# Patient Record
Sex: FEMALE | HISPANIC OR LATINO | Employment: UNEMPLOYED | URBAN - METROPOLITAN AREA
[De-identification: names, ages, dates, MRNs, and addresses within clinical notes are randomized per-mention and may not be internally consistent; named-entity substitution may affect disease eponyms.]

---

## 2020-03-16 ENCOUNTER — APPOINTMENT (OUTPATIENT)
Dept: RADIOLOGY | Facility: MEDICAL CENTER | Age: 50
End: 2020-03-16
Attending: SURGERY
Payer: COMMERCIAL

## 2020-03-20 ENCOUNTER — HOSPITAL ENCOUNTER (OUTPATIENT)
Dept: RADIOLOGY | Facility: MEDICAL CENTER | Age: 50
End: 2020-03-20
Attending: SURGERY
Payer: COMMERCIAL

## 2020-03-20 DIAGNOSIS — C50.411 MALIGNANT NEOPLASM OF UPPER-OUTER QUADRANT OF RIGHT FEMALE BREAST, UNSPECIFIED ESTROGEN RECEPTOR STATUS (HCC): ICD-10-CM

## 2020-03-20 PROCEDURE — A9503 TC99M MEDRONATE: HCPCS

## 2020-03-30 ENCOUNTER — APPOINTMENT (OUTPATIENT)
Dept: HEMATOLOGY ONCOLOGY | Facility: MEDICAL CENTER | Age: 50
End: 2020-03-30
Payer: COMMERCIAL

## 2020-03-30 ENCOUNTER — HOSPITAL ENCOUNTER (OUTPATIENT)
Dept: RADIOLOGY | Facility: MEDICAL CENTER | Age: 50
End: 2020-03-30
Attending: SURGERY
Payer: COMMERCIAL

## 2020-03-30 DIAGNOSIS — C50.411 MALIGNANT NEOPLASM OF UPPER-OUTER QUADRANT OF RIGHT FEMALE BREAST, UNSPECIFIED ESTROGEN RECEPTOR STATUS (HCC): ICD-10-CM

## 2020-03-30 PROCEDURE — A9552 F18 FDG: HCPCS

## 2020-04-07 ENCOUNTER — APPOINTMENT (OUTPATIENT)
Dept: ADMISSIONS | Facility: MEDICAL CENTER | Age: 50
End: 2020-04-07
Payer: COMMERCIAL

## 2020-04-09 NOTE — PROGRESS NOTES
COVID-19 Pre-surgery screenin. Do you have an undiagnosed respiratory illness or symptoms such as coughing or sneezing? *NO** (Yes/No)  a. Onset of Sx  b. Acute vs. chronic respiratory illness    2. Do you have an unexplained fever greater than 100.4 degrees Fahrenheit or 38 degrees Celsius?     **NO* (Yes/No)    3. Have you had direct exposure to a patient who tested positive for Covid-19?    *NO** (Yes/No)    4. Have you traveled within the last 14 days to [place], [place], [place], [place], or [place]?    *NO** (Yes/No)

## 2020-04-10 ENCOUNTER — ANESTHESIA EVENT (OUTPATIENT)
Dept: SURGERY | Facility: MEDICAL CENTER | Age: 50
End: 2020-04-10
Payer: COMMERCIAL

## 2020-04-10 ENCOUNTER — HOSPITAL ENCOUNTER (OUTPATIENT)
Facility: MEDICAL CENTER | Age: 50
End: 2020-04-10
Attending: SURGERY | Admitting: SURGERY
Payer: COMMERCIAL

## 2020-04-10 ENCOUNTER — APPOINTMENT (OUTPATIENT)
Dept: RADIOLOGY | Facility: MEDICAL CENTER | Age: 50
End: 2020-04-10
Attending: SURGERY
Payer: COMMERCIAL

## 2020-04-10 ENCOUNTER — ANESTHESIA (OUTPATIENT)
Dept: SURGERY | Facility: MEDICAL CENTER | Age: 50
End: 2020-04-10
Payer: COMMERCIAL

## 2020-04-10 VITALS
BODY MASS INDEX: 43.33 KG/M2 | RESPIRATION RATE: 16 BRPM | SYSTOLIC BLOOD PRESSURE: 103 MMHG | HEART RATE: 88 BPM | TEMPERATURE: 97.4 F | WEIGHT: 220.68 LBS | HEIGHT: 60 IN | DIASTOLIC BLOOD PRESSURE: 64 MMHG | OXYGEN SATURATION: 97 %

## 2020-04-10 DIAGNOSIS — G89.18 POST-OPERATIVE PAIN: ICD-10-CM

## 2020-04-10 LAB
ERYTHROCYTE [DISTWIDTH] IN BLOOD BY AUTOMATED COUNT: 41.1 FL (ref 35.9–50)
HCG UR QL: NEGATIVE
HCT VFR BLD AUTO: 41.8 % (ref 37–47)
HGB BLD-MCNC: 13.9 G/DL (ref 12–16)
MCH RBC QN AUTO: 29 PG (ref 27–33)
MCHC RBC AUTO-ENTMCNC: 33.3 G/DL (ref 33.6–35)
MCV RBC AUTO: 87.1 FL (ref 81.4–97.8)
PATHOLOGY CONSULT NOTE: NORMAL
PLATELET # BLD AUTO: 333 K/UL (ref 164–446)
PMV BLD AUTO: 9.8 FL (ref 9–12.9)
RBC # BLD AUTO: 4.8 M/UL (ref 4.2–5.4)
WBC # BLD AUTO: 7.8 K/UL (ref 4.8–10.8)

## 2020-04-10 PROCEDURE — 700101 HCHG RX REV CODE 250: Performed by: SURGERY

## 2020-04-10 PROCEDURE — 88309 TISSUE EXAM BY PATHOLOGIST: CPT

## 2020-04-10 PROCEDURE — 700105 HCHG RX REV CODE 258: Performed by: SURGERY

## 2020-04-10 PROCEDURE — 700111 HCHG RX REV CODE 636 W/ 250 OVERRIDE (IP): Performed by: ANESTHESIOLOGY

## 2020-04-10 PROCEDURE — 160046 HCHG PACU - 1ST 60 MINS PHASE II: Performed by: SURGERY

## 2020-04-10 PROCEDURE — 700111 HCHG RX REV CODE 636 W/ 250 OVERRIDE (IP): Performed by: SURGERY

## 2020-04-10 PROCEDURE — 160002 HCHG RECOVERY MINUTES (STAT): Performed by: SURGERY

## 2020-04-10 PROCEDURE — 160048 HCHG OR STATISTICAL LEVEL 1-5: Performed by: SURGERY

## 2020-04-10 PROCEDURE — 501838 HCHG SUTURE GENERAL: Performed by: SURGERY

## 2020-04-10 PROCEDURE — 500371 HCHG DRAIN, BLAKE 10MM: Performed by: SURGERY

## 2020-04-10 PROCEDURE — 700102 HCHG RX REV CODE 250 W/ 637 OVERRIDE(OP): Performed by: ANESTHESIOLOGY

## 2020-04-10 PROCEDURE — 160025 RECOVERY II MINUTES (STATS): Performed by: SURGERY

## 2020-04-10 PROCEDURE — 160029 HCHG SURGERY MINUTES - 1ST 30 MINS LEVEL 4: Performed by: SURGERY

## 2020-04-10 PROCEDURE — 500389 HCHG DRAIN, RESERVOIR SUCT JP 100CC: Performed by: SURGERY

## 2020-04-10 PROCEDURE — 160035 HCHG PACU - 1ST 60 MINS PHASE I: Performed by: SURGERY

## 2020-04-10 PROCEDURE — A6402 STERILE GAUZE <= 16 SQ IN: HCPCS | Performed by: SURGERY

## 2020-04-10 PROCEDURE — A9270 NON-COVERED ITEM OR SERVICE: HCPCS | Performed by: ANESTHESIOLOGY

## 2020-04-10 PROCEDURE — C1788 PORT, INDWELLING, IMP: HCPCS | Performed by: SURGERY

## 2020-04-10 PROCEDURE — 81025 URINE PREGNANCY TEST: CPT

## 2020-04-10 PROCEDURE — 700101 HCHG RX REV CODE 250: Performed by: ANESTHESIOLOGY

## 2020-04-10 PROCEDURE — 85027 COMPLETE CBC AUTOMATED: CPT

## 2020-04-10 PROCEDURE — 160009 HCHG ANES TIME/MIN: Performed by: SURGERY

## 2020-04-10 PROCEDURE — 88307 TISSUE EXAM BY PATHOLOGIST: CPT | Mod: 59

## 2020-04-10 PROCEDURE — 160036 HCHG PACU - EA ADDL 30 MINS PHASE I: Performed by: SURGERY

## 2020-04-10 PROCEDURE — 501497 HCHG SURGICLIP: Performed by: SURGERY

## 2020-04-10 PROCEDURE — 160041 HCHG SURGERY MINUTES - EA ADDL 1 MIN LEVEL 4: Performed by: SURGERY

## 2020-04-10 PROCEDURE — 501445 HCHG STAPLER, SKIN DISP: Performed by: SURGERY

## 2020-04-10 DEVICE — POWER PORTMRI COMPATABLE: Type: IMPLANTABLE DEVICE | Site: CHEST | Status: FUNCTIONAL

## 2020-04-10 RX ORDER — HYDROMORPHONE HYDROCHLORIDE 2 MG/ML
INJECTION, SOLUTION INTRAMUSCULAR; INTRAVENOUS; SUBCUTANEOUS PRN
Status: DISCONTINUED | OUTPATIENT
Start: 2020-04-10 | End: 2020-04-10 | Stop reason: SURG

## 2020-04-10 RX ORDER — HYDROCODONE BITARTRATE AND ACETAMINOPHEN 5; 325 MG/1; MG/1
1-2 TABLET ORAL EVERY 4 HOURS PRN
Qty: 30 TAB | Refills: 0 | Status: SHIPPED | OUTPATIENT
Start: 2020-04-10 | End: 2020-04-17

## 2020-04-10 RX ORDER — MIDAZOLAM HYDROCHLORIDE 1 MG/ML
1 INJECTION INTRAMUSCULAR; INTRAVENOUS
Status: DISCONTINUED | OUTPATIENT
Start: 2020-04-10 | End: 2020-04-10 | Stop reason: HOSPADM

## 2020-04-10 RX ORDER — OXYCODONE HCL 5 MG/5 ML
10 SOLUTION, ORAL ORAL
Status: COMPLETED | OUTPATIENT
Start: 2020-04-10 | End: 2020-04-10

## 2020-04-10 RX ORDER — DEXAMETHASONE SODIUM PHOSPHATE 4 MG/ML
INJECTION, SOLUTION INTRA-ARTICULAR; INTRALESIONAL; INTRAMUSCULAR; INTRAVENOUS; SOFT TISSUE PRN
Status: DISCONTINUED | OUTPATIENT
Start: 2020-04-10 | End: 2020-04-10 | Stop reason: SURG

## 2020-04-10 RX ORDER — TAMOXIFEN CITRATE 10 MG/1
10 TABLET ORAL EVERY EVENING
Status: ON HOLD | COMMUNITY
End: 2020-07-15

## 2020-04-10 RX ORDER — SODIUM CHLORIDE, SODIUM LACTATE, POTASSIUM CHLORIDE, CALCIUM CHLORIDE 600; 310; 30; 20 MG/100ML; MG/100ML; MG/100ML; MG/100ML
INJECTION, SOLUTION INTRAVENOUS CONTINUOUS
Status: DISCONTINUED | OUTPATIENT
Start: 2020-04-10 | End: 2020-04-10 | Stop reason: HOSPADM

## 2020-04-10 RX ORDER — CEFAZOLIN SODIUM 1 G/3ML
INJECTION, POWDER, FOR SOLUTION INTRAMUSCULAR; INTRAVENOUS PRN
Status: DISCONTINUED | OUTPATIENT
Start: 2020-04-10 | End: 2020-04-10 | Stop reason: SURG

## 2020-04-10 RX ORDER — MEPERIDINE HYDROCHLORIDE 25 MG/ML
12.5 INJECTION INTRAMUSCULAR; INTRAVENOUS; SUBCUTANEOUS
Status: DISCONTINUED | OUTPATIENT
Start: 2020-04-10 | End: 2020-04-10 | Stop reason: HOSPADM

## 2020-04-10 RX ORDER — ONDANSETRON 2 MG/ML
4 INJECTION INTRAMUSCULAR; INTRAVENOUS
Status: COMPLETED | OUTPATIENT
Start: 2020-04-10 | End: 2020-04-10

## 2020-04-10 RX ORDER — HALOPERIDOL 5 MG/ML
1 INJECTION INTRAMUSCULAR
Status: DISCONTINUED | OUTPATIENT
Start: 2020-04-10 | End: 2020-04-10 | Stop reason: HOSPADM

## 2020-04-10 RX ORDER — HYDROMORPHONE HYDROCHLORIDE 1 MG/ML
0.1 INJECTION, SOLUTION INTRAMUSCULAR; INTRAVENOUS; SUBCUTANEOUS
Status: DISCONTINUED | OUTPATIENT
Start: 2020-04-10 | End: 2020-04-10 | Stop reason: HOSPADM

## 2020-04-10 RX ORDER — ONDANSETRON 2 MG/ML
INJECTION INTRAMUSCULAR; INTRAVENOUS PRN
Status: DISCONTINUED | OUTPATIENT
Start: 2020-04-10 | End: 2020-04-10 | Stop reason: SURG

## 2020-04-10 RX ORDER — HYDROMORPHONE HYDROCHLORIDE 1 MG/ML
0.4 INJECTION, SOLUTION INTRAMUSCULAR; INTRAVENOUS; SUBCUTANEOUS
Status: DISCONTINUED | OUTPATIENT
Start: 2020-04-10 | End: 2020-04-10 | Stop reason: HOSPADM

## 2020-04-10 RX ORDER — LABETALOL HYDROCHLORIDE 5 MG/ML
5 INJECTION, SOLUTION INTRAVENOUS
Status: DISCONTINUED | OUTPATIENT
Start: 2020-04-10 | End: 2020-04-10 | Stop reason: HOSPADM

## 2020-04-10 RX ORDER — SODIUM CHLORIDE, SODIUM LACTATE, POTASSIUM CHLORIDE, CALCIUM CHLORIDE 600; 310; 30; 20 MG/100ML; MG/100ML; MG/100ML; MG/100ML
1000 INJECTION, SOLUTION INTRAVENOUS CONTINUOUS
Status: DISCONTINUED | OUTPATIENT
Start: 2020-04-10 | End: 2020-04-10 | Stop reason: HOSPADM

## 2020-04-10 RX ORDER — HYDRALAZINE HYDROCHLORIDE 20 MG/ML
5 INJECTION INTRAMUSCULAR; INTRAVENOUS
Status: DISCONTINUED | OUTPATIENT
Start: 2020-04-10 | End: 2020-04-10 | Stop reason: HOSPADM

## 2020-04-10 RX ORDER — OXYCODONE HCL 5 MG/5 ML
5 SOLUTION, ORAL ORAL
Status: COMPLETED | OUTPATIENT
Start: 2020-04-10 | End: 2020-04-10

## 2020-04-10 RX ORDER — DIPHENHYDRAMINE HYDROCHLORIDE 50 MG/ML
12.5 INJECTION INTRAMUSCULAR; INTRAVENOUS
Status: DISCONTINUED | OUTPATIENT
Start: 2020-04-10 | End: 2020-04-10 | Stop reason: HOSPADM

## 2020-04-10 RX ORDER — HYDROMORPHONE HYDROCHLORIDE 1 MG/ML
0.2 INJECTION, SOLUTION INTRAMUSCULAR; INTRAVENOUS; SUBCUTANEOUS
Status: DISCONTINUED | OUTPATIENT
Start: 2020-04-10 | End: 2020-04-10 | Stop reason: HOSPADM

## 2020-04-10 RX ORDER — MIDAZOLAM HYDROCHLORIDE 1 MG/ML
INJECTION INTRAMUSCULAR; INTRAVENOUS PRN
Status: DISCONTINUED | OUTPATIENT
Start: 2020-04-10 | End: 2020-04-10 | Stop reason: SURG

## 2020-04-10 RX ORDER — BUPIVACAINE HYDROCHLORIDE AND EPINEPHRINE 5; 5 MG/ML; UG/ML
INJECTION, SOLUTION EPIDURAL; INTRACAUDAL; PERINEURAL
Status: DISCONTINUED | OUTPATIENT
Start: 2020-04-10 | End: 2020-04-10 | Stop reason: HOSPADM

## 2020-04-10 RX ADMIN — ONDANSETRON 4 MG: 2 INJECTION INTRAMUSCULAR; INTRAVENOUS at 12:20

## 2020-04-10 RX ADMIN — ONDANSETRON 4 MG: 2 INJECTION INTRAMUSCULAR; INTRAVENOUS at 14:50

## 2020-04-10 RX ADMIN — ROCURONIUM BROMIDE 10 MG: 10 INJECTION, SOLUTION INTRAVENOUS at 11:12

## 2020-04-10 RX ADMIN — LIDOCAINE HYDROCHLORIDE 100 MG: 20 INJECTION, SOLUTION INTRAVENOUS at 11:13

## 2020-04-10 RX ADMIN — HYDROMORPHONE HYDROCHLORIDE 0.4 MG: 2 INJECTION, SOLUTION INTRAMUSCULAR; INTRAVENOUS; SUBCUTANEOUS at 12:20

## 2020-04-10 RX ADMIN — CEFAZOLIN 3 G: 330 INJECTION, POWDER, FOR SOLUTION INTRAMUSCULAR; INTRAVENOUS at 11:21

## 2020-04-10 RX ADMIN — DEXAMETHASONE SODIUM PHOSPHATE 4 MG: 4 INJECTION, SOLUTION INTRA-ARTICULAR; INTRALESIONAL; INTRAMUSCULAR; INTRAVENOUS; SOFT TISSUE at 11:19

## 2020-04-10 RX ADMIN — PROPOFOL 200 MG: 10 INJECTION, EMULSION INTRAVENOUS at 11:13

## 2020-04-10 RX ADMIN — MIDAZOLAM HYDROCHLORIDE 2 MG: 1 INJECTION, SOLUTION INTRAMUSCULAR; INTRAVENOUS at 11:08

## 2020-04-10 RX ADMIN — FENTANYL CITRATE 50 MCG: 0.05 INJECTION, SOLUTION INTRAMUSCULAR; INTRAVENOUS at 13:45

## 2020-04-10 RX ADMIN — Medication 140 MG: at 11:13

## 2020-04-10 RX ADMIN — FENTANYL CITRATE 50 MCG: 50 INJECTION, SOLUTION INTRAMUSCULAR; INTRAVENOUS at 11:27

## 2020-04-10 RX ADMIN — FENTANYL CITRATE 50 MCG: 0.05 INJECTION, SOLUTION INTRAMUSCULAR; INTRAVENOUS at 13:10

## 2020-04-10 RX ADMIN — SODIUM CHLORIDE, POTASSIUM CHLORIDE, SODIUM LACTATE AND CALCIUM CHLORIDE 1000 ML: 600; 310; 30; 20 INJECTION, SOLUTION INTRAVENOUS at 09:10

## 2020-04-10 RX ADMIN — HYDROMORPHONE HYDROCHLORIDE 0.4 MG: 2 INJECTION, SOLUTION INTRAMUSCULAR; INTRAVENOUS; SUBCUTANEOUS at 11:35

## 2020-04-10 RX ADMIN — EPHEDRINE SULFATE 10 MG: 50 INJECTION INTRAMUSCULAR; INTRAVENOUS; SUBCUTANEOUS at 11:43

## 2020-04-10 RX ADMIN — FENTANYL CITRATE 50 MCG: 50 INJECTION, SOLUTION INTRAMUSCULAR; INTRAVENOUS at 11:09

## 2020-04-10 RX ADMIN — OXYCODONE HYDROCHLORIDE 10 MG: 5 SOLUTION ORAL at 13:09

## 2020-04-10 RX ADMIN — HALOPERIDOL LACTATE 1 MG: 5 INJECTION, SOLUTION INTRAMUSCULAR at 15:07

## 2020-04-10 ASSESSMENT — PAIN SCALES - GENERAL: PAIN_LEVEL: 4

## 2020-04-10 NOTE — ANESTHESIA PREPROCEDURE EVALUATION
Relevant Problems   No relevant active problems   GERD  Breast Cancer  M.O.  Physical Exam    Airway   Mallampati: III  TM distance: >3 FB  Neck ROM: full       Cardiovascular - normal exam  Rhythm: regular  Rate: normal  (-) murmur     Dental - normal exam         Pulmonary - normal exam  Breath sounds clear to auscultation     Abdominal    Neurological - normal exam                 Anesthesia Plan    ASA 3 (See relevant problem list)       Plan - general       Airway plan will be ETT        Induction: intravenous    Postoperative Plan: Postoperative administration of opioids is intended.    Pertinent diagnostic labs and testing reviewed    Informed Consent:    Anesthetic plan and risks discussed with patient.    Use of blood products discussed with: patient whom consented to blood products.

## 2020-04-10 NOTE — OP REPORT
Operative Report    Date: 4/10/2020    Surgeon: Codie Carreno M.D.    Assistant: ROSARIO Joe    Anesthesiologist: Keira PERALTA    Pre-operative Diagnosis: C50.411 Malignant neoplasm of upper-outer quadrant of right female breast    Post-operative Diagnosis: same     Procedure:     1. 19307 Mastectomy, modified radical, including axillary lymph nodes, with or without pectoralis minor muscle, but excluding pectoralis major muscle  2. 20436 Intraoperative identification (eg, mapping) of sentinel lymph node(s) includes injection of non-radioactive dye  3. left mastectomy (19303       Mastectomy, simple, complete)  4. left internal jugular insertion of tunneled centrally inserted central venous access device (38775 Insertion of tunneled centrally inserted central venous access device, with subcutaneous port; age 5 years or older  )    Procedure in detail: The patient was identified in the pre-operative holding area and brought to the operating room. Correct side and site were identified.     The patient's bilateral anterior chest wall, neck, axilla, and arms were prepped and draped in the usual sterile manner.     We began with the right side. The incision was marked along the skin with a skin marker. A transverse elliptical incision was made in the breast of the skin to include nipple areolar complex. The flaps were raised superiorly to just below the clavicle, medially to the sternum, laterally towards the latissimus dorsi, and inferiorly to the rectus abdominus fascia. Following this, the breast tissue along with the pectoralis major fascia were dissected off the pectoralis major muscle. The dissection was started medially and extended laterally towards the left axilla. The breast was then removed, oriented with suture, and passed off the table.     We proceeded with the right axillary dissection. The subcutaneous tissues were opened with cautery until the lateral border of the pectoralis major was identified.  This was elevated, and the clavipectoral fascia was opened with cautery. We identified the axillary vein at the superior border of the incision. The thoracodorsal bundle was identified and protected. There were mutiple large lymph nodes in the specimen.The fat lateral to the thoracodorsal bundle and inferior to the axillary vein was dissected. The axillary fat pad was raised medially and dissected off the skin until the lateral border of the latissimus dorsi was encountered. The dissection was carried lateral to medial, protecting the thoracodorsal bundle. The superficial brances of the axillary vein were divided. The long thoracic nerve was identified and protected. The pectoralis minor muscle was elevated and the fat pad at the axillary apex was carefully dissected. Dissection was carried along the chest wall and the fat pad was detached and excised from the body. The surgical field was irrigated and a drain was placed.     After the tissues were irrigated, and hemostasis was assured, and a drain was brought in and placed in the superior and inferior breast flaps. The subcutaneus tissues were then approximated with interrupted 4-0 Vicryl sutures and the skin was closed with running monocryl. The drain was sutured to the chest wall with 3-0 nylon suture.     We then proceeded with the left side. A transverse elliptical incision was made in the breast of the skin to include nipple areolar complex. The flaps were raised superiorly to just below the clavicle, medially to the sternum, laterally towards the latissimus dorsi, and inferiorly to the rectus abdominus fascia. Following this, the breast tissue along with the pectoralis major fascia were dissected off the pectoralis major muscle. The dissection was started medially and extended laterally towards the left axilla. The breast was then removed, oriented with suture, and passed off the table.    After the tissues were irrigated, and hemostasis was assured, and a  drain was brought in and placed in the superior and inferior breast flaps, as well as the axilla. The subcutaneus tissues were then approximated with interrupted 4-0 Vicryl sutures and the skin was closed with running monocryl. The drains were sutured to the chest wall with 3-0 nylon suture. Dressing was applied.    For the port, I began by accessing the left internal jugular vein under ultrasonographic guidance/using landmarks. I accessed the vein in one attempt. I then threaded a wire into the vein and verified its placement with xray. Then using modified Seldinger technique, I threaded the catheter over the wire, and noted good position at the cavo-atrial junction at 20 cm at the skin.    I then infiltrated local anesthetic over the left chest at the area to place the port. I also infiltrated local anesthetic over the area I was to tunnel the catheter subcutaneously. I made a skin incision and made a subcutaneous pocket to accommodate the port. I then tunneled the catheter subcutaneously toward the subcutaneous pocket, and then attached the port to the catheter. It flushed and anel well. I sutured the port to the chest wall with prolene and closed the subcutaneous pocket in two layers. It was flushed with heparin.     The patient was awakened from anesthetic, and was taken to the recovery room in stable condition.    Sponge and needle counts were correct at the end of the case.      Specimen:    1.  right mastectomy  2.  right axillary tissue  3. left mastectomy    EBL: 100 mL    Drains: 10 mm drain bilateral mastectomy sites    Dispo: stable, extubated, to PACU    Codie Carreno M.D.  [facility]  [phone]

## 2020-04-10 NOTE — ANESTHESIA TIME REPORT
Anesthesia Start and Stop Event Times     Date Time Event    4/10/2020 1058 Ready for Procedure     1108 Anesthesia Start     1254 Anesthesia Stop        Responsible Staff  04/10/20    Name Role Begin End    Shaan Crockett M.D. Anesth 1108 1254        Preop Diagnosis (Free Text):  Pre-op Diagnosis     Malignant neoplasm upper outer quadrant right breast         Preop Diagnosis (Codes):    Post op Diagnosis  Malignant neoplasm of upper-outer quadrant of right breast in female, estrogen receptor negative (HCC)      Premium Reason  Non-Premium    Comments:

## 2020-04-10 NOTE — OR NURSING
1253 Pt to PACU from OR. Report from anesthesia and OR RN. Pt on 6L mask O2, arouses on calling, patent airway. Respirations even and unlabored. VSS. Dressing anterior chest CDI. Port dressing L chest CDI. RASHAUN drains x3 with sanguinous fluid. Port placement verified in OR by MD by X-ray per op report.     1309 Pt more awake, c/o 9/10 incision pain, medicated per MAR. Tolerating juice, no nausea.     1345 Pt waking after rest, c/o 8/10 pain. Medicated per MAR.     1428 Pt waking again, states pain is tolerable 6/10, declines further pain medication. Updated  over phone. Titrated to RA, if tolerates will move to phase II shortly.     1450 Discussed plan to move to phase II. Pt became nauseated, medicated per MAR, will assess for effect.     1509 No relief after nausea intervention, medicated with haldol per MAR.     1540 Emptied RASHAUN drains R side, output recorded. Upon assessment R upper chest with moderate swelling, feels somewhat firm to touch. Called Dr. Carreno, discussed drain output, per MD level of swelling as expected, instructed to adjust ACE wrap to distribute pressure more evenly.     1600 Pt resting, appears comfortable, eyes closed. Respirations even and unlabored. No needs at this time.     1615 Bedding changed, pt had incontinent void. Called  again for updates.     1655 Pt states readiness for DC. On RA, pain tolerable, nausea improved, tolerating PO. Assessment of R upper chest unchanged from previous, swelling may have actually decreased since adjusting ACE wrap compared to before. Report to [facility]. Pt transferred to T Pre Op 31 by titus with RN, chart sent with pt.

## 2020-04-10 NOTE — ANESTHESIA POSTPROCEDURE EVALUATION
Patient: Zahida Davis    Procedure Summary     Date:  04/10/20 Room / Location:  [facility] 10 / SURGERY [facility]    Anesthesia Start:  1108 Anesthesia Stop:  1254    Procedures:       MASTECTOMY-SIMPLE (Left Breast)      MASTECTOMY, MODIFIED RADICAL (Right Breast)      LYMPHADENECTOMY, AXILLARY-RIGHT (Right Axilla)      INSERTION, CATHETER FOR PORT PLACEMENT (Left Chest) Diagnosis:  (Malignant neoplasm upper outer quadrant right breast )    Surgeon:  Codie Carreno M.D. Responsible Provider:  Shaan Crockett M.D.    Anesthesia Type:  general ASA Status:  3          Final Anesthesia Type: general  Last vitals  BP   Blood Pressure: (!) 98/72    Temp   36.4 °C (97.6 °F)    Pulse   Pulse: 93   Resp   15    SpO2   92 %      Anesthesia Post Evaluation    Patient location during evaluation: PACU  Patient participation: complete - patient participated  Level of consciousness: awake and alert  Pain score: 4    Airway patency: patent  Anesthetic complications: no  Cardiovascular status: hemodynamically stable  Respiratory status: acceptable  Hydration status: euvolemic    PONV: none           Nurse Pain Score: 5 (NPRS)

## 2020-04-10 NOTE — PROGRESS NOTES
Mastectomía: instrucciones después de la cirugía    El manejo del dolor  Las personas experimentan diferentes tipos y cantidades de dolor o molestias después de la cirugía. El objetivo del tratamiento del dolor es evaluar maguire propio nivel de incomodidad y dick medicamentos según sea necesario. Tendrá mejores resultados controlando maguire dolor si paulo analgésicos antes de que maguire dolor sea intenso.  Se le recetará un narcótico para el tratamiento del dolor moderado. Se recomienda dick medicamentos para el dolor cuando el dolor se experimenta en un horario regular. El ibuprofeno (Advil o Motrin) o Tylenol se pueden agregar o reemplazar al medicamento narcótico.    Todos somos diferentes y si un plan para disminuir maguire dolor no funciona, se cambiará. La curación y la recuperación mejoran con un buen control del dolor.  Notifíquenos cualquier alergia a medicamentos, reacciones o problemas médicos que le impidan dick estos medicamentos. Los narcóticos no deben tomarse con bebidas alcohólicas. No use narcóticos mientras conduce.  Los narcóticos también pueden causar o empeorar el estreñimiento, así que aumente la ingesta de líquidos, coma alimentos ricos en fibra, lilian las ciruelas pasas y el salvado, y asegúrese de levantarse y levantarse de la cama para sona pequeños paseos.  Jean bolsa de hielo puede ser útil para disminuir el malestar y la hinchazón, particularmente en la axila después de jean disección de ganglios linfáticos. Jean almohada pequeña colocada en la axila también puede disminuir las molestias.  Aunque no lo haya sentido en shirely momento, ni lo recuerde después, habrá tenido un tubo en la garganta odell la cirugía. Condon a menudo puede causar dolor de garganta odell unos días después de la cirugía.    Cuidado de incisión y vendaje  Maguire incisión, o cicatriz, tiene puntos de sutura y tiras esterilizadas, que son pequeñas tiras dionte de cinta adhesiva, y está cubierta por un vendaje de gasa y jean cinta o un  "vendaje de plástico.  No quite el apósito, las tiras esterilizadas o los puntos de sutura. Retiraremos el vendaje en siete a 10 días. También retiraremos las suturas en jean o dos semanas a menos que se absorban solas. Si el apósito o las tiras esterilizadas se caen, no intente reemplazarlos.  Puede bañarse un día después de que el drenaje salga y si tiene un apósito de plástico.  Si tiene jean gasa y cinta de papel, puede retirarla dos días después de la cirugía y ducharse después de eso. Use jean toalla para secar eva la incisión después de bañarse. Tenga cuidado de no tocar ni quitar las tiras esterilizadas o suturas.  Los moretones y algo de hinchazón son comunes en las mujeres después de la cirugía.  Jean fiebre de bajo paulette que está por debajo de los 100 grados Fahrenheit es normal el día después de la cirugía.  Tendrá un drenaje Jayshree (RASHAUN) después de la cirugía. Paola drenaje es un tubo de plástico desde debajo de la piel hasta el exterior de maguire cuerpo con un bulbo conectado a él. Vacíe el drenaje dos o yash veces al día o cuando la bombilla esté llena. Anote la cantidad drenada en jean hoja de papel. Maguire enfermera le enseñará cómo vaciar maguire drenaje. Se incluye jean hoja de información sobre drenajes RASHAUN en maguire carpeta.  Se puede asignar jean enfermera de atención domiciliaria para verificar maguire progreso en el hogar.    Actividad  Evite la actividad extenuante, levantar objetos pesados ??y hacer ejercicio vigoroso hasta que se retiren los puntos. Dígale a maguire médico qué hace y él o chadd lo ayudarán a hacer un plan personal para \"qué puede hacer cuando\" después de la cirugía.  Caminar es jean actividad normal que se puede reiniciar de inmediato.  No puede hacer las tareas del hogar o conducir hasta que el drenaje esté fuera. Puede reiniciar la conducción cuando ya no esté usando narcóticos y se sienta seguro girando el volante y deteniéndose rápidamente.  Después de jean disección de ganglios linfáticos, no evite " usar godinez brazo, dilma no lo ejercite hasta godinez primera visita postoperatoria.  Le darán ejercicios para recuperar el movimiento y la flexibilidad. Puede ser derivado a fisioterapia para rehabilitación adicional si es necesario.  La mayoría de las personas regresan a trabajar dentro de yash a seis semanas. El regreso al trabajo varía según godinez tipo de trabajo, godinez almaz general y darlene preferencias personales. Discuta volver a trabajar con nosotros.    Dieta  Puede reanudar godinez dieta regular tan pronto lilian pueda dick líquidos después de recuperarse de la anestesia.  Alentamos de ocho a 10 vasos de agua y bebidas sin cafeína por día, muchas frutas y verduras, así lilian alimentos bajos en grasa. Hable con nosotros sobre las recomendaciones para jean alimentación saludable.    Cuidados de seguimiento  Los resultados de la patología de godinez cirugía deben estar disponibles dentro de jean semana después de godinez cirugía.  Nos comunicaremos con usted por teléfono con los resultados o le informaremos en godinez visita postoperatoria. Por favor, háganos saber el número de teléfono donde puede ser contactado con los resultados.  Godniez vendaje se cambiará o se quitará en godinez visita postoperatoria.    Cuando contactarnos  Póngase en contacto con nosotros con cualquier pregunta. Llame al [phone] y solicite hablar con jean enfermera odell el día o con el servicio de contestador por la tarde para comunicarse con godinez médico o el médico de carolyn.  Dolor que no se jeison con medicamentos.  Fiebre de más de 100 grados Fahrenheit o escalofríos  Sangrado excesivo, lilian un apósito con geeta.  Hinchazón excesiva  Enrojecimiento fuera del apósito  Secreción u mal olor de la herida  Reacciones alérgicas u otras a los medicamentos.  Estreñimiento  Ansiedad, depresión, problemas para dormir, necesita más apoyo    Dirección de la oficina:  [id number]  [place] [id number]    Codie Carreno M.D.  [facility]  [phone]

## 2020-04-10 NOTE — DISCHARGE INSTRUCTIONS
Instrucciones Para [place]  (Home Care Instructions)    ACTIVIDAD: Descanse y tome todo con mucha calma las primeras 24 horas después de maguire cirugía.  Linnette persona adulta responsable debe permanecer con usted odell shirley periodo de tiempo.  Es normal sentirse sonoliento o sonolienta odell esas primeras horas.  Le recomendamos que no josh nada que requiera equilibrio, susy decisiones a mucha coordinación de maguire parte.    NO JOSH ESTO PURANTE LAS PRIMERAS 24 HORAS:   Manejar o conducir algún vehiculo, operar maquinarias o utilizar electrodomesticos.   Beber cerveza o algún otro tipo de bebida alcohólica.   Susy decisiones importantes o firmar documentos legales.    INSTRUCCIONES ESPECIALES:     Mastectomía: instrucciones después de la cirugía     El manejo del dolor  Las personas experimentan diferentes tipos y cantidades de dolor o molestias después de la cirugía. El objetivo del tratamiento del dolor es evaluar maguire propio nivel de incomodidad y susy medicamentos según sea necesario. Tendrá mejores resultados controlando maguire dolor si paulo analgésicos antes de que maguire dolor sea intenso.  Se le recetará un narcótico para el tratamiento del dolor moderado. Se recomienda susy medicamentos para el dolor cuando el dolor se experimenta en un horario regular. El ibuprofeno (Advil o Motrin) o Tylenol se pueden agregar o reemplazar al medicamento narcótico.     Todos somos diferentes y si un plan para disminuir maguire dolor no funciona, se cambiará. La curación y la recuperación mejoran con un buen control del dolor.  Notifíquenos cualquier alergia a medicamentos, reacciones o problemas médicos que le impidan susy estos medicamentos. Los narcóticos no deben tomarse con bebidas alcohólicas. No use narcóticos mientras conduce.  Los narcóticos también pueden causar o empeorar el estreñimiento, así que aumente la ingesta de líquidos, coma alimentos ricos en fibra, lilian las ciruelas pasas y el salvado, y asegúrese de levantarse y  levantarse de la cama para sona pequeños paseos.  Jean bolsa de hielo puede ser útil para disminuir el malestar y la hinchazón, particularmente en la axila después de jean disección de ganglios linfáticos. Jean almohada pequeña colocada en la axila también puede disminuir las molestias.  Aunque no lo haya sentido en shirley momento, ni lo recuerde después, habrá tenido un tubo en la garganta odell la cirugía. [place] a menudo puede causar dolor de garganta odell unos días después de la cirugía.     Cuidado de incisión y vendaje  Maguire incisión, o cicatriz, tiene puntos de sutura y tiras esterilizadas, que son pequeñas tiras dionte de cinta adhesiva, y está cubierta por un vendaje de gasa y jean cinta o un vendaje de plástico.  No quite el apósito, las tiras esterilizadas o los puntos de sutura. Retiraremos el vendaje en siete a 10 días. También retiraremos las suturas en jean o dos semanas a menos que se absorban solas. Si el apósito o las tiras esterilizadas se caen, no intente reemplazarlos.  Puede bañarse un día después de que el drenaje salga y si tiene un apósito de plástico.  Si tiene jean gasa y cinta de papel, puede retirarla dos días después de la cirugía y ducharse después de eso. Use jean toalla para secar eva la incisión después de bañarse. Tenga cuidado de no tocar ni quitar las tiras esterilizadas o suturas.  Los moretones y algo de hinchazón son comunes en las mujeres después de la cirugía.  Jean fiebre de bajo paulette que está por debajo de los 100 grados [facility] es normal el día después de la cirugía.  Tendrá un drenaje [place]ree (RASHAUN) después de la cirugía. Paola drenaje es un tubo de plástico desde debajo de la piel hasta el exterior de maguire cuerpo con un bulbo conectado a él. Vacíe el drenaje dos o yash veces al día o cuando la bombilla esté llena. Anote la cantidad drenada en jean hoja de papel. Maguire enfermera le enseñará cómo vaciar maguire drenaje. Se incluye jean hoja de información sobre drenajes RASHAUN en maguire  "carpeta.  Se puede asignar jean enfermera de atención domiciliaria para verificar maguire progreso en el hogar.     Actividad  Evite la actividad extenuante, levantar objetos pesados ??y hacer ejercicio vigoroso hasta que se retiren los puntos. Dígale a maguire médico qué hace y él o chadd lo ayudarán a hacer un plan personal para \"qué puede hacer cuando\" después de la cirugía.  Caminar es jean actividad normal que se puede reiniciar de inmediato.  No puede hacer las tareas del hogar o conducir hasta que el drenaje esté fuera. Puede reiniciar la conducción cuando ya no esté usando narcóticos y se sienta seguro girando el volante y deteniéndose rápidamente.  Después de jean disección de ganglios linfáticos, no evite usar maguire brazo, dilma no lo ejercite hasta maguire primera visita postoperatoria.  Le darán ejercicios para recuperar el movimiento y la flexibilidad. Puede ser derivado a fisioterapia para rehabilitación adicional si es necesario.  La mayoría de las personas regresan a trabajar dentro de yash a seis semanas. El regreso al trabajo varía según maguire tipo de trabajo, maguire almaz general y darlene preferencias personales. Discuta volver a trabajar con nosotros.     Dieta  Puede reanudar maguire dieta regular tan pronto lilian pueda dick líquidos después de recuperarse de la anestesia.  Alentamos de ocho a 10 vasos de agua y bebidas sin cafeína por día, muchas frutas y verduras, así lilian alimentos bajos en grasa. Hable con nosotros sobre las recomendaciones para jean alimentación saludable.     Cuidados de seguimiento  Los resultados de la patología de maguire cirugía deben estar disponibles dentro de jean semana después de maguire cirugía.  Nos comunicaremos con usted por teléfono con los resultados o le informaremos en maguire visita postoperatoria. Por favor, háganos saber el número de teléfono donde puede ser contactado con los resultados.  Maguire vendaje se cambiará o se quitará en maguire visita postoperatoria.     Cuando contactarnos  Póngase en contacto con " nosotros con cualquier pregunta. Llame al [phone] y solicite hablar con jean enfermera odell el día o con el servicio de contestador por la tarde para comunicarse con maguire médico o el médico de carolyn.  Dolor que no se jeison con medicamentos.  Fiebre de más de 100 grados Fahrenheit o escalofríos  Sangrado excesivo, lisa un apósito con geeta.  Hinchazón excesiva  Enrojecimiento fuera del apósito  Secreción u mal olor de la herida  Reacciones alérgicas u otras a los medicamentos.  Estreñimiento  Ansiedad, depresión, problemas para dormir, necesita más apoyo     Dirección de la oficina:  [address] [facility] [address]  [place] [address]     Codie Carreno M.D.  Donte quirúrgico occidental  [phone]         DIETA: Para evitar las nauseas, prosiga despacito con maguire dieta a medida que pueda ir tolerándola mejor, evite comidas muy condimentadas o grasosas odell shirley primer día.  Vaya agregando comidas más substanciadas a maguire dieta a medida que asi lo indique maguire médica.  Los bebés pueden beber leche preparada o formula, ásl lisa también leche del seno de la madre a medida que vayan teniendo hambre.  SIGA AGREGANDO LIQUIDOS Y COMIDAS CON FIBRA PARA EVITAR ESTREÑIMIENTO.    LISA BAÑARSE Y CAMBIAR LOS VENDAJES DE LA CIRUGIA: See Drain care instructions    MEDICAMENTOS/MEDICINAS:  Vuelva a dick darelne medicamentos diarios.  [place] los medicamentos que se le prescribe con un poco de comida.  Si no le prescribe ningún tipo de medicamento, entonces puede dick medicinas para el dolor que no contienen aspirina, si las necesita.  LAS MEDICINAS PARA EL DOLOR PUEDEN ESTREÑIRLE MUCHO.  [place] un suavizante para el excremento o materia fecal (stool softener) o un laxativo lisa por ejemplo: senokot, pericolase, o leche de magnesia, si lo necesita.    La prescripción la administro [place] for pain.  La ultima sosis de medicina para el dolor fue administrada Oxycodone 10mg at 1:09pm    Se debe hacer jean consulta medica con el doctor en 1-2  denisse, Líame para hacer la sumeet.    Usted debe LIAMAR A NASCIMENTO MEDICO si tiene los siguientes síntomas:   -   Linnette fiebre más reyes de 101 grados Fahrenheit.   -   Un dolor incesante aún con los medicamentos, o nauseas y vómito persistente.   -   Un sangrado excesivo (geeta que traspasa los vendajes o gasas) o algúln tipo de drenaje inesperado que proviene de la henda.     -   Un color griffin exagerado o hinchazón alrededor del área en donde se le hizo incisión o mukesh, o un drenaje de pus o con olor ramiro proveniente de la henda.   -    La inhabilidad de orinar o vaciar nascimento vejiga en 8 horas.   -    Problemas con a respiración o dante en el pecho.    Usted debe llamar al 911 si se presentan problemas con el dolor al respirar o el pecho.  Si no se puede ponnoer en comunicación con un medica o con el centro de cirugía, usted debe ir a la estación de emergencia (emergency room) más cercana o a un centro de atención de urgencia (urgent care center).  El teléfono del medico es: [phone] Dr Carreno    LOS SÍNTOMAS DE UN LEVE RESFRIO SON MUY NORMALES.  ADEMÁS USTED PUEDE LLEGAR A SENTIR DANTE GENERALES DE MÚSCULOS, IRRITACIÓN EN LA GARGANTA, DANTE DE MICHAEL Y/O UN POCO DE NAUSEAS.    Sie tiene alguna pregunta, llame a nascimento médico.  Si nascimento médico no se encuentra disponible, por favor llame al Centro de Cirugía at [phone].  el Centro está abierto de Lunes a Viernes desde las 7:00 de la manana hasta las 7:00 de la noche.  ted también puede llamar al CENTRO DE LLAMADAS SOBRE LA AMEYA o HEALTH HOTLINE.  Paola está abierto viente y cuatro horas por latoya, siete ramirez por semana, allí podrá hablar con linnette enfermera.  Editaame al [phone], o al almao casper 0 [phone].    Mi firma a continuación indica que he recibido y entiendco estas instrucciones acera de los cuidados en la casa (Home Care Instructions)    Usted recibirá linnette encuesta en la correspondencia en las siguientes semanas y le pedimos que por favor tome  un momento para completar cam encuesta y regresaría a hosotros.  Nuestro objetivó es brindarle un cuidado muy barahona y par lo tanto apreciamos darlene coméntanos.  Muchas amy por teresa escogido el [facility].        ACTIVITY: Rest and take it easy for the first 24 hours.  A responsible adult is recommended to remain with you during that time.  It is normal to feel sleepy.  We encourage you to not do anything that requires balance, judgment or coordination.    MILD FLU-LIKE SYMPTOMS ARE NORMAL. YOU MAY EXPERIENCE GENERALIZED MUSCLE ACHES, THROAT IRRITATION, HEADACHE AND/OR SOME NAUSEA.    FOR 24 HOURS DO NOT:  Drive, operate machinery or run household appliances.  Drink beer or alcoholic beverages.   Make important decisions or sign legal documents.    SPECIAL INSTRUCTIONS: See Drain care instructions    DIET: To avoid nausea, slowly advance diet as tolerated, avoiding spicy or greasy foods for the first day.  Add more substantial food to your diet according to your physician's instructions.  INCREASE FLUIDS AND FIBER TO AVOID CONSTIPATION.    SURGICAL DRESSING/BATHING: ***    FOLLOW-UP APPOINTMENT:  A follow-up appointment should be arranged with your doctor in 1-2 weeks; call to schedule.    You should CALL YOUR PHYSICIAN if you develop:  Fever greater than 101 degrees F.  Pain not relieved by medication, or persistent nausea or vomiting.  Excessive bleeding (blood soaking through dressing) or unexpected drainage from the wound.  Extreme redness or swelling around the incision site, drainage of pus or foul smelling drainage.  Inability to urinate or empty your bladder within 8 hours.  Problems with breathing or chest pain.    You should call 911 if you develop problems with breathing or chest pain.  If you are unable to contact your doctor or surgical center, you should go to the nearest emergency room or urgent care center.    Physician's telephone #: [phone]   [place]    If any questions arise, call your doctor.  If your doctor is not available, please feel free to call the Surgical Center at [phone].  The Center is open Monday through Friday from 7AM to 7PM.  You can also call the HEALTH HOTLINE open 24 hours/day, 7 days/week and speak to a nurse at [phone], or toll free at [phone].    A registered nurse may call you a few days after your surgery to see how you are doing after your procedure.    MEDICATIONS: Resume taking daily medication.  Take prescribed pain medication with food.  If no medication is prescribed, you may take non-aspirin pain medication if needed.  PAIN MEDICATION CAN BE VERY CONSTIPATING.  Take a stool softener or laxative such as senokot, pericolace, or milk of magnesia if needed.    Prescription given for Norco for pain.  Last pain medication given at Oxycodone 10mg at 1:09pm.    If your physician has prescribed pain medication that includes Acetaminophen (Tylenol), do not take additional Acetaminophen (Tylenol) while taking the prescribed medication.    Depression / Suicide Risk    As you are discharged from this [facility] facility, it is important to learn how to keep safe from harming yourself.    Recognize the warning signs:  · Abrupt changes in personality, positive or negative- including increase in energy   · Giving away possessions  · Change in eating patterns- significant weight changes-  positive or negative  · Change in sleeping patterns- unable to sleep or sleeping all the time   · Unwillingness or inability to communicate  · Depression  · Unusual sadness, discouragement and loneliness  · Talk of wanting to die  · Neglect of personal appearance   · Rebelliousness- reckless behavior  · Withdrawal from people/activities they love  · Confusion- inability to concentrate     If you or a loved one observes any of these behaviors or has concerns about self-harm, here's what you can do:  · Talk about it- your feelings and  reasons for harming yourself  · Remove any means that you might use to hurt yourself (examples: pills, rope, extension cords, firearm)  · Get professional help from the community (Mental Health, Substance Abuse, psychological counseling)  · Do not be alone:Call your Safe Contact- someone whom you trust who will be there for you.  · Call your local CRISIS HOTLINE 825-6112 or 512-792-5724  · Call your local Children's Mobile Crisis Response Team [place] (400) 675-8799 or www.[employer]  · Call the toll free National Suicide Prevention Hotlines   · National Suicide Prevention Lifeline 050-562-RXXH (8585)  · National Hope Line Network 800-SUICIDE (431-4054)

## 2020-04-10 NOTE — ANESTHESIA PROCEDURE NOTES
Airway  Date/Time: 4/10/2020 11:14 AM  Performed by: Shaan Crockett M.D.  Authorized by: Shaan Crockett M.D.     Location:  OR  Urgency:  Elective  Indications for Airway Management:  Anesthesia      Spontaneous Ventilation: absent    Sedation Level:  Deep  Preoxygenated: Yes    Patient Position:  Sniffing  Final Airway Type:  Endotracheal airway  Final Endotracheal Airway:  ETT  Cuffed: Yes    Technique Used for Successful ETT Placement:  Video laryngoscopy  Insertion Site:  Oral  Blade Type:  Crow  Laryngoscope Blade/Videolaryngoscope Blade Size:  3  ETT Size (mm):  7.0  Measured from:  Lips  ETT to Lips (cm):  20  Placement Verified by: auscultation and capnometry    Cormack-Lehane Classification:  Grade I - full view of glottis  Number of Attempts at Approach:  1           NEPHROLOGY-NSN [phone]        Patient seen and examined on the machine at HD        MEDICATIONS  (STANDING):  heparin  Injectable 5000Unit(s) SubCutaneous every 8 hours  insulin lispro (HumaLOG) corrective regimen sliding scale  SubCutaneous every 6 hours  metoclopramide 10milliGRAM(s) Oral every 8 hours  aspirin  chewable 81milliGRAM(s) Oral daily  midodrine 2.5milliGRAM(s) Oral daily      VITAL:  T(C): , Max: 36.8 (06-18 @ 20:19)  T(F): , Max: 98.2 (06-18 @ 20:19)  HR: 77  BP: 129/70  BP(mean): --  RR: 18  SpO2: 93%  Wt(kg): --    I and O's:    I & Os for current day (as of 06-19 @ 08:25)  =============================================  IN: 1020 ml / OUT: 200 ml / NET: 820 ml        PHYSICAL EXAM:    Constitutional: NAD  HEENT: PERRLA    Neck:  No JVD  Respiratory: CTAB/L  Cardiovascular: S1 and S2  Gastrointestinal: BS+, soft, NT/ND  Extremities: No peripheral edema  Neurological: A/O x 3, no focal deficits  Psychiatric: Normal mood, normal affect  : No Raphael  Skin: No rashes  Access: Not applicable    LABS:                        10.1   10.69 )-----------( 625      ( 17 Jun 2017 15:12 )             32.8     06-17    134<L>  |  93<L>  |  40<H>  ----------------------------<  74  4.4   |  22  |  3.41<H>    Ca    10.6<H>      17 Jun 2017 15:12            Urine Studies:          RADIOLOGY & ADDITIONAL STUDIES:

## 2020-04-11 NOTE — PROGRESS NOTES
Erroneous encounter.   Pt AOx4,  acewrap intact to chest, gauze with tegaderm drsgs intact to chest with scant bloody drainage, JPs x3 intact and compressed, pt reports pain is tolerable at this time , taking sips of fluid, port drsg lt chest c/d/i... instructions on RASHAUN care given to pt and  and sheet to record drainage, Memo Alas's discharge summary that has discharge instructions in [place], read and reviewed  With pt and , all questions answered, discharged to home

## 2020-04-12 NOTE — PROGRESS NOTES
"  Non face to face prolonged services of clinical data and chart information reviewed for a total time of 30 performed on 4/10 for Adeel Davis    Reviewed were:    Referral    Previous encounters    Imaging all imaging including colonoscopy, mammography, CT/tomography, MRI/PET    Previous procedures biopsy and surgical procedures including pathology analysis and interpretation        Notes C50.411 (ICD-10-CM) - Malignant neoplasm of upper-outer quadrant of right female breast                              Media all personal and family clinical data including germline DNA testing from outside institution    Miscellaneous reports    Patient summaries family history as documented by referring clinician        Counseling, testing information and materials prepared    \"I spent 30 minutes  from 1130 to 1200 reviewing past records, prior to visit pertaining to genetic risk.\"     Orlando Fernández M.D.  edited  "

## 2020-04-14 ENCOUNTER — OFFICE VISIT (OUTPATIENT)
Dept: HEMATOLOGY ONCOLOGY | Facility: MEDICAL CENTER | Age: 50
End: 2020-04-14
Payer: COMMERCIAL

## 2020-04-14 VITALS
TEMPERATURE: 98.2 F | HEIGHT: 61 IN | RESPIRATION RATE: 18 BRPM | BODY MASS INDEX: 40.96 KG/M2 | SYSTOLIC BLOOD PRESSURE: 124 MMHG | WEIGHT: 216.93 LBS | OXYGEN SATURATION: 91 % | DIASTOLIC BLOOD PRESSURE: 84 MMHG | HEART RATE: 80 BPM

## 2020-04-14 DIAGNOSIS — C50.512 BILATERAL MALIGNANT NEOPLASM OF LOWER OUTER QUADRANT OF BREAST IN FEMALE, UNSPECIFIED ESTROGEN RECEPTOR STATUS (HCC): ICD-10-CM

## 2020-04-14 DIAGNOSIS — C50.511 BILATERAL MALIGNANT NEOPLASM OF LOWER OUTER QUADRANT OF BREAST IN FEMALE, UNSPECIFIED ESTROGEN RECEPTOR STATUS (HCC): ICD-10-CM

## 2020-04-14 PROCEDURE — 99358 PROLONG SERVICE W/O CONTACT: CPT | Performed by: MEDICAL GENETICS

## 2020-04-14 PROCEDURE — 99203 OFFICE O/P NEW LOW 30 MIN: CPT | Performed by: MEDICAL GENETICS

## 2020-04-14 PROCEDURE — 99000 SPECIMEN HANDLING OFFICE-LAB: CPT | Performed by: MEDICAL GENETICS

## 2020-04-14 NOTE — NON-PROVIDER
Saliva kit was sent home with patient to complete and send off via [employer] due to COVID-19 and provider's preference.    Instructed patient on how to retrieve a saliva sample to send off to [facility] for testing, all necessary documentation was sent with the kit.    Patient agreed and verbalized understanding.

## 2020-04-14 NOTE — PROGRESS NOTES
GENETIC RISK ASSESSMENT    Zahida Davis is a 49 y.o. year old patient who was referred by Wai for cancer genetic risk assessment.    Chief Complaint: breast cancer    HPI: The patient was well until 2012 at which time a suspicious physical exam caused the patient to be referred for imaging). A suspicious (mammogram study identified a (left) breast mass. A biopsy  was performed and a specimen was submitted to pathology.     A diagnosis of  carcinoma was made. The patient was treated surgically   She was found to have contralateral breast cancer recently  The patient's family history is fragmented: the patient knows little of her family   The patient has 2 children who are well    Review of personal and family histories suggested that a cancer genetic risk assessment was in order.    Review of Systems (ROS):  Constitutional N  Eyes N  ENT N   Respiratory N  Cardiovascular N  Gastrointestinal N  Genitourinary N  Musculoskeletal N  Skin N  Neurological N  Endocrine N  Psychiatric N  Hematologic/lymphatic N  Allergic/Immunologic   All other systems reviewed and are negative.    History (Past/Family)  Family History:   No family history on file.   3 generation pedigree built   Yes   Shilo-Leni empiric risk calculation No   [place] II empiric risk calculation  No    Social History:       Social History     Socioeconomic History   • Marital status:      Spouse name: Not on file   • Number of children: Not on file   • Years of education: Not on file   • Highest education level: Not on file   Occupational History   • Not on file   Social Needs   • Financial resource strain: Not on file   • Food insecurity     Worry: Not on file     Inability: Not on file   • Transportation needs     Medical: Not on file     Non-medical: Not on file   Tobacco Use   • Smoking status: Never Smoker   • Smokeless tobacco: Never Used   Substance and Sexual Activity   • Alcohol use: Yes     Comment: rare social   • Drug use: Never   •  Sexual activity: Not on file   Lifestyle   • Physical activity     Days per week: Not on file     Minutes per session: Not on file   • Stress: Not on file   Relationships   • Social connections     Talks on phone: Not on file     Gets together: Not on file     Attends Congregational service: Not on file     Active member of club or organization: Not on file     Attends meetings of clubs or organizations: Not on file     Relationship status: Not on file   • Intimate partner violence     Fear of current or ex partner: Not on file     Emotionally abused: Not on file     Physically abused: Not on file     Forced sexual activity: Not on file   Other Topics Concern   • Not on file   Social History Narrative   • Not on file       Patient Past History:  Past Medical History:   Diagnosis Date   • Arthritis     hands ,    • Cancer (HCC) 2013    breast cancer/ chemo and radiation   • Heart burn    • Snoring    • Urinary incontinence            NCCN Testing Criteria Met                            Yes    Physical Exam  Constitutional    There were no vitals taken for this visit.     No PE done for covid-19 precautions   Assessment and Plan:  Patient with bilateral multifocal breast cancer first diagnosed at age 41. Little fam adalgisa history     I spent a total of 30 minutes of face to face time with >50% of time spent in counseling and coordination of care discussing matters stated in above note. Video  used     Vobile panel ordered      Orlando Fernández M.D.

## 2020-04-20 ENCOUNTER — HOSPITAL ENCOUNTER (OUTPATIENT)
Dept: RADIOLOGY | Facility: MEDICAL CENTER | Age: 50
End: 2020-04-20
Payer: COMMERCIAL

## 2020-04-20 ENCOUNTER — PATIENT OUTREACH (OUTPATIENT)
Dept: CASE MANAGEMENT | Facility: MEDICAL CENTER | Age: 50
End: 2020-04-20

## 2020-04-21 PROBLEM — C50.411 MALIGNANT NEOPLASM OF UPPER-OUTER QUADRANT OF RIGHT BREAST IN FEMALE, ESTROGEN RECEPTOR POSITIVE (HCC): Status: ACTIVE | Noted: 2020-04-21

## 2020-04-21 PROBLEM — Z17.0 MALIGNANT NEOPLASM OF UPPER-OUTER QUADRANT OF RIGHT BREAST IN FEMALE, ESTROGEN RECEPTOR POSITIVE (HCC): Status: ACTIVE | Noted: 2020-04-21

## 2020-04-21 NOTE — PROGRESS NOTES
On April 20, 2020, Oncology Social Worker Michelle Arizmendi and Oncology Nurse Navigator Lupis Millard contacted pt. via telephone.  SUSAN Arizmendi served as  for pt. and LESLEY Millard.  Pt. shared she had surgery on the 10th of this month and her next appointment is tomorrow to have her drains removed.  Pt. shared she has an appointment this Friday with her Radiation Oncologist and an appointment with her medical oncologist on May 6th.  Pt. shared for her surgery she was helped by her HOPES coordinator John with $2000 to help cover the cost of surgery.  Pt. shared she is worried about how she will be able to afford chemotherapy or radiation treatment.  SUSAN Arizmendi informed pt. they would call her back after her appointment with medical oncologist to figure out what options are available.  Pt. thanked LESLEY Millard and SUSAN Arizmendi for their call and concern regarding her treatment.

## 2020-04-24 ENCOUNTER — HOSPITAL ENCOUNTER (OUTPATIENT)
Dept: RADIATION ONCOLOGY | Facility: MEDICAL CENTER | Age: 50
End: 2020-04-30
Attending: RADIOLOGY
Payer: COMMERCIAL

## 2020-04-24 VITALS
OXYGEN SATURATION: 97 % | SYSTOLIC BLOOD PRESSURE: 129 MMHG | DIASTOLIC BLOOD PRESSURE: 69 MMHG | HEART RATE: 82 BPM | TEMPERATURE: 97.2 F

## 2020-04-24 DIAGNOSIS — Z17.0 MALIGNANT NEOPLASM OF UPPER-OUTER QUADRANT OF RIGHT BREAST IN FEMALE, ESTROGEN RECEPTOR POSITIVE (HCC): ICD-10-CM

## 2020-04-24 DIAGNOSIS — C50.411 MALIGNANT NEOPLASM OF UPPER-OUTER QUADRANT OF RIGHT BREAST IN FEMALE, ESTROGEN RECEPTOR POSITIVE (HCC): ICD-10-CM

## 2020-04-24 PROCEDURE — 99205 OFFICE O/P NEW HI 60 MIN: CPT | Performed by: RADIOLOGY

## 2020-04-24 PROCEDURE — 99214 OFFICE O/P EST MOD 30 MIN: CPT | Performed by: RADIOLOGY

## 2020-04-24 ASSESSMENT — PAIN SCALES - GENERAL: PAINLEVEL: 7=MODERATE-SEVERE PAIN

## 2020-04-24 NOTE — NON-PROVIDER
Patient was seen today in clinic with Dr. Kahn for Consult.  Vitals signs and weight were obtained and pain assessment was completed.  Allergies and medications were reviewed with the patient.  Review of systems completed.     Vitals/Pain:  Vitals:    04/24/20 1308   BP: 129/69   Pulse: 82   Temp: 36.2 °C (97.2 °F)   SpO2: 97%   Pain Score: 7=Moderate-Severe Pain        Allergies:   Patient has no known allergies.    Current Medications:  Current Outpatient Medications   Medication Sig Dispense Refill   • diphenhydrAMINE HCl (ALLERGY MED PO) Take 1 Tab by mouth 1 time daily as needed.     • tamoxifen (NOLVADEX) 10 MG Tab Take 10 mg by mouth every evening.       No current facility-administered medications for this encounter.          PCP:  Galilea Stafford, Bob Ass't

## 2020-04-24 NOTE — CONSULTS
RADIATION ONCOLOGY CONSULT    DATE OF SERVICE: 4/24/2020    IDENTIFICATION: A 49 y.o. female with history of prior left breast cancer estrogen positive status post neoadjuvant chemotherapy with residual disease.  Received radiation therapy and then tamoxifen now with new right breast cancer status post mastectomy ER WV positive with 11 nodes positive.  Also had a left breast mastectomy with no residual tumor.  She is here at the kind request of Dr. Codie Carreno.      HISTORY OF PRESENT ILLNESS: Patient has mentioned carries a diagnosis of a left breast cancer in 2012 were she received AC Taxol neoadjuvant lady for a clinical T3N0.  She had residual disease and was a [id number].  She then received radiation therapy and then tamoxifen.  She has since moved to [place] and has had no evidence of disease however in December she noted a mass in the upper outer quadrant of the right breast this prompted a diagnostic mammogram and ultrasound on 12/31/2019.  This showed abnormal right axillary adenopathy consistent with pathologic nodes and a shadowing lesion in the upper outer quadrant of the right breast in the 9 to 10 o'clock position 10 cm from the nipple this was not palpable and not readily appreciated on mammography.  Biopsy recommended.  The axillary adenopathy measured 3 x 4 cm in the upper outer quadrant abnormality measured 2 x 5 cm.  Biopsy of both sites were obtained on 2/5/2020.  The right breast revealed focal invasive ductal carcinoma grade 2 ER 80% WV 60% Ki-67 10%.  HER-2/ruel was FISH negative.  The right lymph node biopsy was also consistent with metastatic invasive ductal carcinoma ER WV positive.  PET CT scan done 3/30/2020 showed a hypermetabolic right breast mass consistent with malignancy and a metastatic right axillary adenopathy.  There was no distant metastatic disease and no hypermetabolic bone lesions.  It was decided at that time that she undergo mastectomy rather than neoadjuvant chemotherapy  because of extensive chemotherapy she had previously.  She therefore then underwent a right mastectomy and axillary node dissection and left mastectomy.  This was done on 4/10/2020 the right breast revealed a 2.5 cm moderately differentiated invasive ductal carcinoma with lymphatic lesion present.  11 out of 13 nodes were positive.  Margins were free of tumor.  There was focal extranodal extension.  She was therefore staged as a  [id number] breast cancer.  She is here to discuss radiation therapy in her overall treatment plan.  She plans to see Dr. Tabor regarding systemic management.  She understands radiation therapy would follow what ever chemotherapy Dr. Tabor is recommending.    PAST MEDICAL HISTORY:   Past Medical History:   Diagnosis Date   • Arthritis     hands ,    • Cancer (HCC) 2013    breast cancer/ chemo and radiation   • Heart burn    • Malignant neoplasm of upper-outer quadrant of right female breast (HCC)    • Snoring    • Urinary incontinence        PAST SURGICAL HISTORY:  Past Surgical History:   Procedure Laterality Date   • PB MASTECTOMY, SIMPLE, COMPLETE Left 4/10/2020    Procedure: MASTECTOMY-SIMPLE;  Surgeon: Codie Carreno M.D.;  Location: [facility];  Service: General   • PB MASTECTOMY, MODIFIED RADICAL Right 4/10/2020    Procedure: MASTECTOMY, MODIFIED RADICAL;  Surgeon: Codie Carreno M.D.;  Location: [facility];  Service: General   • AXILLARY NODE DISSECTION Right 4/10/2020    Procedure: LYMPHADENECTOMY, AXILLARY-RIGHT;  Surgeon: Codie Carreno M.D.;  Location: [facility];  Service: General   • CATH PLACEMENT Left 4/10/2020    Procedure: INSERTION, CATHETER FOR PORT PLACEMENT;  Surgeon: Codie Carreno M.D.;  Location: [facility];  Service: General   • OTHER  2013    lumpectomy   • GYN SURGERY  1989,1991    c sec x 2   • OTHER ORTHOPEDIC SURGERY      lumbar discectomy       GYNECOLOGICAL STATUS:  G2, P2 no hormone replacement or  birth control pills.  Last menstrual period 1/16/2020    CURRENT MEDICATIONS:  Current Outpatient Medications   Medication Sig Dispense Refill   • diphenhydrAMINE HCl (ALLERGY MED PO) Take 1 Tab by mouth 1 time daily as needed.     • tamoxifen (NOLVADEX) 10 MG Tab Take 10 mg by mouth every evening.       No current facility-administered medications for this encounter.        ALLERGIES:    Patient has no known allergies.    FAMILY HISTORY:    Family History   Problem Relation Age of Onset   • Cancer Father         prostate   • Cancer Other    [unfilled]        SOCIAL HISTORY:     reports that she has never smoked. She has never used smokeless tobacco. She reports previous alcohol use. She reports that she does not use drugs.  She lives with her .    REVIEW OF SYSTEMS: Pertinent positives consist of chest pain from the surgery she has thyroid medications that she is taken for years.  She has dry mouth and dental issues.  She has incontinence muscle pain and depression.  The rest of the review of systems is negative and has been reviewed by me. It is in the nursing note dated 4/24/2020 in [facility]        PHYSICAL EXAM:    1= Restricted in physically strenuous activity, but ambulatory and able to carry out work of a light sedentary nature, e.g., light housework, office work.  Vitals:    04/24/20 1308   BP: 129/69   Pulse: 82   Temp: 36.2 °C (97.2 °F)   SpO2: 97%   Pain Score: 7=Moderate-Severe Pain        GENERAL: Well-appearing alert and oriented x3 in no major distress, obese  Chest wall: Bilateral mastectomies are present port has been placed in the left infraclavicular region.  She has evidence of the prior port in the right infraclavicular region.  All areas are well-healed there is no sign of any nodularity or mass just a little bit of swelling in the right axilla.  HEENT:  Pupils are equal, round, and reactive to light.  Extraocular muscles   are intact. Sclerae nonicteric.  Conjunctivae pink.  Oral cavity,  tongue   protrudes midline.   NECK:   No peripheral adenopathy of the neck, supraclavicular fossa or axillae   bilaterally.  LUNGS:  Clear to ascultation   HEART:  Regular rate and rhythm.  No murmur appreciated  ABDOMEN:  Soft. No evidence of hepatosplenomegaly.    EXTREMITIES:  Without Edema.  NEUROLOGIC:  Cranial nerves II through XII were intact. Normal stance and gait motor and sensory grossly within normal limits                IMPRESSION:    A 49 y.o. with   history of prior left breast cancer estrogen positive status post neoadjuvant chemotherapy with residual disease.  Received radiation therapy and then tamoxifen now with new right breast cancer status post mastectomy ER ND positive with 11 nodes positive.  Also had a left breast mastectomy with no residual tumor.      RECOMMENDATIONS:   I am recommending postoperative radiation therapy after she is completed chemotherapy to decrease her chance of local regional recurrence within the chest wall and draining lymphatics.  She understands she is at high risk of local recurrence in these areas.  I've described the details of radiation along with the side effects both acute and chronic, including but not exclusive to fatigue, skin reaction, local soreness, swelling, delayed healing, scarring fibrosis discoloration. Ample time was allowed for questions, and patient understands.    Because we did this through translation I like to see her back in 3 months follow-up with my MA who is fluent and a  to make sure that she understands the entire situation.  At this point I do think she does understand it but I would just like to cement down any further questions we have when my MA is available.    Thank you for the opportunity to participate in her care.  If any questions or comments, please do not hesitate in calling.    Please note that this dictation was created using voice recognition software. I have made every reasonable attempt to correct obvious  errors, but I expect that there are errors of grammar and possibly content that I did not discover before finalizing the note.

## 2020-05-07 ENCOUNTER — PATIENT OUTREACH (OUTPATIENT)
Dept: OTHER | Facility: MEDICAL CENTER | Age: 50
End: 2020-05-07
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
## 2020-05-20 RX ORDER — 0.9 % SODIUM CHLORIDE 0.9 %
3 VIAL (ML) INJECTION PRN
Status: CANCELLED | OUTPATIENT
Start: 2020-05-20

## 2020-05-20 RX ORDER — ONDANSETRON 8 MG/1
8 TABLET, ORALLY DISINTEGRATING ORAL PRN
Status: CANCELLED | OUTPATIENT
Start: 2020-05-21

## 2020-05-20 RX ORDER — ONDANSETRON 2 MG/ML
4 INJECTION INTRAMUSCULAR; INTRAVENOUS PRN
Status: CANCELLED | OUTPATIENT
Start: 2020-05-21

## 2020-05-20 RX ORDER — SODIUM CHLORIDE 9 MG/ML
INJECTION, SOLUTION INTRAVENOUS CONTINUOUS
Status: CANCELLED | OUTPATIENT
Start: 2020-05-21

## 2020-05-20 RX ORDER — 0.9 % SODIUM CHLORIDE 0.9 %
10 VIAL (ML) INJECTION PRN
Status: CANCELLED | OUTPATIENT
Start: 2020-05-21

## 2020-05-20 RX ORDER — PROCHLORPERAZINE MALEATE 10 MG
10 TABLET ORAL EVERY 6 HOURS PRN
Status: CANCELLED | OUTPATIENT
Start: 2020-05-21

## 2020-05-20 RX ORDER — 0.9 % SODIUM CHLORIDE 0.9 %
10 VIAL (ML) INJECTION PRN
Status: CANCELLED | OUTPATIENT
Start: 2020-05-20

## 2020-05-20 RX ORDER — 0.9 % SODIUM CHLORIDE 0.9 %
VIAL (ML) INJECTION PRN
Status: CANCELLED | OUTPATIENT
Start: 2020-05-21

## 2020-05-20 RX ORDER — 0.9 % SODIUM CHLORIDE 0.9 %
3 VIAL (ML) INJECTION PRN
Status: CANCELLED | OUTPATIENT
Start: 2020-05-21

## 2020-05-20 RX ORDER — 0.9 % SODIUM CHLORIDE 0.9 %
VIAL (ML) INJECTION PRN
Status: CANCELLED | OUTPATIENT
Start: 2020-05-20

## 2020-05-21 ENCOUNTER — DOCUMENTATION (OUTPATIENT)
Dept: NUTRITION | Facility: MEDICAL CENTER | Age: 50
End: 2020-05-21

## 2020-05-21 ENCOUNTER — OUTPATIENT INFUSION SERVICES (OUTPATIENT)
Dept: ONCOLOGY | Facility: MEDICAL CENTER | Age: 50
End: 2020-05-21
Attending: INTERNAL MEDICINE
Payer: COMMERCIAL

## 2020-05-21 VITALS
DIASTOLIC BLOOD PRESSURE: 69 MMHG | SYSTOLIC BLOOD PRESSURE: 131 MMHG | BODY MASS INDEX: 42.41 KG/M2 | WEIGHT: 224.65 LBS | OXYGEN SATURATION: 94 % | HEART RATE: 94 BPM | TEMPERATURE: 98.4 F | RESPIRATION RATE: 18 BRPM | HEIGHT: 61 IN

## 2020-05-21 DIAGNOSIS — C50.411 MALIGNANT NEOPLASM OF UPPER-OUTER QUADRANT OF RIGHT BREAST IN FEMALE, ESTROGEN RECEPTOR POSITIVE (HCC): ICD-10-CM

## 2020-05-21 DIAGNOSIS — Z17.0 MALIGNANT NEOPLASM OF UPPER-OUTER QUADRANT OF RIGHT BREAST IN FEMALE, ESTROGEN RECEPTOR POSITIVE (HCC): ICD-10-CM

## 2020-05-21 LAB
ALBUMIN SERPL BCP-MCNC: 4.1 G/DL (ref 3.2–4.9)
ALBUMIN/GLOB SERPL: 1.1 G/DL
ALP SERPL-CCNC: 87 U/L (ref 30–99)
ALT SERPL-CCNC: 23 U/L (ref 2–50)
ANION GAP SERPL CALC-SCNC: 14 MMOL/L (ref 7–16)
AST SERPL-CCNC: 18 U/L (ref 12–45)
BASOPHILS # BLD AUTO: 0.2 % (ref 0–1.8)
BASOPHILS # BLD: 0.02 K/UL (ref 0–0.12)
BILIRUB SERPL-MCNC: 0.2 MG/DL (ref 0.1–1.5)
BUN SERPL-MCNC: 11 MG/DL (ref 8–22)
CALCIUM SERPL-MCNC: 9.8 MG/DL (ref 8.5–10.5)
CHLORIDE SERPL-SCNC: 98 MMOL/L (ref 96–112)
CO2 SERPL-SCNC: 21 MMOL/L (ref 20–33)
CREAT SERPL-MCNC: 0.37 MG/DL (ref 0.5–1.4)
EOSINOPHIL # BLD AUTO: 0.01 K/UL (ref 0–0.51)
EOSINOPHIL NFR BLD: 0.1 % (ref 0–6.9)
ERYTHROCYTE [DISTWIDTH] IN BLOOD BY AUTOMATED COUNT: 42.1 FL (ref 35.9–50)
ESTRADIOL SERPL-MCNC: <5 PG/ML
FSH SERPL-ACNC: 31 MIU/ML
GLOBULIN SER CALC-MCNC: 3.7 G/DL (ref 1.9–3.5)
GLUCOSE SERPL-MCNC: 231 MG/DL (ref 65–99)
HCT VFR BLD AUTO: 38.4 % (ref 37–47)
HGB BLD-MCNC: 12.7 G/DL (ref 12–16)
IMM GRANULOCYTES # BLD AUTO: 0.12 K/UL (ref 0–0.11)
IMM GRANULOCYTES NFR BLD AUTO: 1 % (ref 0–0.9)
LYMPHOCYTES # BLD AUTO: 3.12 K/UL (ref 1–4.8)
LYMPHOCYTES NFR BLD: 25 % (ref 22–41)
MCH RBC QN AUTO: 29.2 PG (ref 27–33)
MCHC RBC AUTO-ENTMCNC: 33.1 G/DL (ref 33.6–35)
MCV RBC AUTO: 88.3 FL (ref 81.4–97.8)
MONOCYTES # BLD AUTO: 0.5 K/UL (ref 0–0.85)
MONOCYTES NFR BLD AUTO: 4 % (ref 0–13.4)
NEUTROPHILS # BLD AUTO: 8.72 K/UL (ref 2–7.15)
NEUTROPHILS NFR BLD: 69.7 % (ref 44–72)
NRBC # BLD AUTO: 0 K/UL
NRBC BLD-RTO: 0 /100 WBC
PLATELET # BLD AUTO: 384 K/UL (ref 164–446)
PMV BLD AUTO: 9.5 FL (ref 9–12.9)
POTASSIUM SERPL-SCNC: 3.6 MMOL/L (ref 3.6–5.5)
PROT SERPL-MCNC: 7.8 G/DL (ref 6–8.2)
RBC # BLD AUTO: 4.35 M/UL (ref 4.2–5.4)
SODIUM SERPL-SCNC: 133 MMOL/L (ref 135–145)
WBC # BLD AUTO: 12.5 K/UL (ref 4.8–10.8)

## 2020-05-21 PROCEDURE — 80053 COMPREHEN METABOLIC PANEL: CPT

## 2020-05-21 PROCEDURE — 304540 HCHG NITRO SET VENT 2ND TUB

## 2020-05-21 PROCEDURE — 83001 ASSAY OF GONADOTROPIN (FSH): CPT

## 2020-05-21 PROCEDURE — 96413 CHEMO IV INFUSION 1 HR: CPT

## 2020-05-21 PROCEDURE — 700105 HCHG RX REV CODE 258: Performed by: INTERNAL MEDICINE

## 2020-05-21 PROCEDURE — 85025 COMPLETE CBC W/AUTO DIFF WBC: CPT

## 2020-05-21 PROCEDURE — 82670 ASSAY OF TOTAL ESTRADIOL: CPT

## 2020-05-21 PROCEDURE — 96415 CHEMO IV INFUSION ADDL HR: CPT

## 2020-05-21 PROCEDURE — 96375 TX/PRO/DX INJ NEW DRUG ADDON: CPT

## 2020-05-21 PROCEDURE — A4212 NON CORING NEEDLE OR STYLET: HCPCS

## 2020-05-21 PROCEDURE — 96417 CHEMO IV INFUS EACH ADDL SEQ: CPT

## 2020-05-21 PROCEDURE — 700111 HCHG RX REV CODE 636 W/ 250 OVERRIDE (IP): Performed by: INTERNAL MEDICINE

## 2020-05-21 RX ORDER — LIDOCAINE AND PRILOCAINE 25; 25 MG/G; MG/G
CREAM TOPICAL PRN
COMMUNITY

## 2020-05-21 RX ORDER — DEXAMETHASONE 4 MG/1
8 TABLET ORAL 2 TIMES DAILY
Status: ON HOLD | COMMUNITY
End: 2020-07-20

## 2020-05-21 RX ORDER — PROCHLORPERAZINE MALEATE 10 MG
10 TABLET ORAL EVERY 8 HOURS PRN
COMMUNITY
End: 2020-08-25

## 2020-05-21 RX ORDER — ONDANSETRON 8 MG/1
8 TABLET, ORALLY DISINTEGRATING ORAL EVERY 12 HOURS PRN
COMMUNITY
End: 2020-08-25

## 2020-05-21 RX ADMIN — ONDANSETRON 16 MG: 2 INJECTION INTRAMUSCULAR; INTRAVENOUS at 10:40

## 2020-05-21 RX ADMIN — DOCETAXEL 156.8 MG: 20 INJECTION, SOLUTION, CONCENTRATE INTRAVENOUS at 11:06

## 2020-05-21 RX ADMIN — CYCLOPHOSPHAMIDE 1254 MG: 1 INJECTION, POWDER, FOR SOLUTION INTRAVENOUS; ORAL at 13:30

## 2020-05-21 RX ADMIN — DEXAMETHASONE SODIUM PHOSPHATE 12 MG: 4 INJECTION, SOLUTION INTRA-ARTICULAR; INTRALESIONAL; INTRAMUSCULAR; INTRAVENOUS; SOFT TISSUE at 10:27

## 2020-05-21 RX ADMIN — HEPARIN 500 UNITS: 100 SYRINGE at 14:57

## 2020-05-21 NOTE — PROGRESS NOTES
Chemotherapy Verification - PRIMARY RN  C1 D1      Height = 1.54 m  Weight = 101.9 kg  BSA = 2.09 m2       Medication: docetaxel  Dose: 75 mg/m2  Calculated Dose: 156.75 mg                             (In mg/m2, AUC, mg/kg)     Medication: cyclophosphamide  Dose: 600 mg/m2  Calculated Dose: 1254 mg                             (In mg/m2, AUC, mg/kg)      I confirm this process was performed independently with the BSA and all final chemotherapy dosing calculations congruent.  Any discrepancies of 10% or greater have been addressed with the chemotherapy pharmacist. The resolution of the discrepancy has been documented in the EPIC progress notes.

## 2020-05-21 NOTE — PROGRESS NOTES
Chemotherapy Verification - SECONDARY RN     D1C1    Height = 154 cm  Weight = 101.9 kg  BSA = 2.09 m2       Medication: Docetaxel (Taxotere)  Dose: 75 mg/m2  Calculated Dose: 156.75 mg                                  Medication: Cyclophosphamide (Cytoxan)  Dose: 600 mg/m2  Calculated Dose: 1254 mg                             I confirm that this process was performed independently.

## 2020-05-21 NOTE — PROGRESS NOTES
Pt ambulatory to hospitals for C1 D1 of Taxotere and Cytoxan for recurrent breast cancer.  Pt w/ no s/s of infection, pt has no complaints at this time.  Pt reports using EMLA cream on PORT although no EMLA cream present.  Pt educated on proper EMLA cream usage for maximum effect.  Pt PORT accessed using sterile technique, brisk blood return noted, labs drawn per orders, flushed per protocol.  Pt lab results w/n parameters for tx today.  Pre-meds, and chemo administered w/no adverse reactions.  PORT w/ brisk blood return post-chemo, flushed and heparinized per protocol, de-accessed, gauze and tape bandage applied.  Pt left on foot in NAD.  Confirmed pt's next appt.

## 2020-05-21 NOTE — PROGRESS NOTES
"Patient Name: Zahida Davis     Protocol: TC       *Dosing Reference*  Docetaxel 75 mg/m2 IV over 60 minutes on Day 1  Cyclophosphamide 600 mg/m2 IV over 30 minutes on Day 1  21-day cycle for 4 cycles  NCCN Guidelines for Breast Cancer V.1.2019.  Oral DENNEY, et al. J Clin Oncol. 2009;27(8):1177-83.    Dx: Breast CA Stage IIIA ER+/ND+, HER2-         Allergies:  Patient has no known allergies.     /69   Pulse 94   Temp 36.9 °C (98.4 °F) (Temporal)   Resp 18   Ht 1.54 m (5' 0.63\")   Wt 101.9 kg (224 lb 10.4 oz)   SpO2 94%   BMI 42.97 kg/m²  Body surface area is 2.09 meters squared.    Labs 5/21/20  ANC~ 8720 Plt = 384k   Hgb = 12.7     SCr = 0.37 mg/dL CrCl ~ >125 mL/min   LFT's = WNLs  TBili = 0.2      Drug Order   (Drug name, dose, route, IV Fluid & volume, frequency, number of doses) Cycle: 1      Previous treatment: Radiation and tamoxifen     Medication = Docetaxel  Base Dose= 75 mg/m2  Calc Dose: Base Dose x 2.09 m2 = 156.75 mg  Final Dose = 156.8 mg  Route = IV  Fluid & Volume =  mL  Admin Duration = Over 60 minutes          <10% difference, okay to treat with final dose   Medication = Cyclophosphamide  Base Dose= 600 mg/m2  Calc Dose: Base Dose x 2.09 m2 = 1254 mg  Final Dose = 1254 mg  Route = IV  Fluid & Volume =  mL  Admin Duration = Over 30 minutes          <10% difference, okay to treat with final dose     By my signature below, I confirm this process was performed independently with the BSA and all final chemotherapy dosing calculations congruent. I have reviewed the above chemotherapy order and that my calculation of the final dose and BSA (when applicable) corroborate those calculations of the  pharmacist. Discrepancies of 10% or greater in the written dose have been addressed and documented within the [facility] Progress notes.    Stevie Cortés, PharmD    "

## 2020-05-21 NOTE — PROGRESS NOTES
"Nutrition Services: RD Consultation/ New Chemotherapy Start  49 year old female with diagnosis of malignant neoplasm of the upper-outer quadrant of right breast in female.       Past Medical History:   Diagnosis Date   • Arthritis     hands ,    • Cancer (HCC) 2013    breast cancer/ chemo and radiation   • Heart burn    • Malignant neoplasm of upper-outer quadrant of right female breast (HCC)    • Snoring    • Urinary incontinence        RD met with pt to assess current intake, appetite, and nutritional status.  Pt presents to appointment alone.    Pt seen during infusion, used [employer] with  [facility]olph ID#[id number]. Pt reports eats 2 good -sized meals daily and has recently gained weight.    We discussed nutrition related goals during treatment as well and RD role. We discussed the potential side effects of treatment and their impacts on nutrition. We discussed the benefit of small, frequent meals and snacks focusing on high calorie/protein items as well as the benefits of maintaining weight and lean muscle mass throughout treatment. Provided and went over handouts/food lists regarding a general healthy diet, healthy snack ideas, foods high in protein and the benefits of a plant based diet limiting red and processed meats.  Pt did not express any further nutrition-related questions or concerns at this time.     Assessment:  • Pertinent Labs 5/21/20: Na 133, glucose 231, creatinine 0.37  • Pertinent Meds: decadron, zofran, heparin, docetaxel, cytoxan  • Weight: 224 lbs today at infusion  • Height: 5'1\"  • BMI: 42.97  • Per chart review, pt has had stable weight over last 6 weeks.    Weight History from Chart:  217 lbs on 4/14/20  220 lbs on 4/10/20    Weight Change/Malnutrition Risk: Pt has stable weight with recent weight gain, reports good PO intake at home, and appears well nourished during visit. Malnutrition criteria not met at this time.      Plan/Recommendations:  · RD introduced self and " role during treatment.  · Provided and discussed handout regarding general nutrition guidelines as well as nutritional recommendations for patient's with breast cancer, including tips/tricks should side effects of tx occur.   • Focus on high calorie and protein foods, fruits and vegetables with all meals/snacks - handout provided.  • Encouraged omega-3 foods in diet and discussed fish oil supplementation.  • Answered pt's questions about soda and alcohol consumption, and protein shakes.    Pt reports understanding and was receptive to information provided.   RD provided contact information. Encouraged pt to reach out as questions/concerns arise.   RD available prn.  [phone]

## 2020-05-21 NOTE — PROGRESS NOTES
Name:  Destiny   ID Number:  [id number]    Content Discussed:  POC, medication allergies, home medication, pain and all assessments, distress screening, head-to-toe assessment, PORT care, lab work, pt questions.

## 2020-05-21 NOTE — PROGRESS NOTES
"Pharmacy Chemotherapy calculation:  Patient Name: Zahida Davis   Dx: recurrent breast cancer: invasive ductal carcinoma, grade 2  > right breast: ER 80%, OK 60%; Ki-67 10%; HER-2/ruel FISH negative  Protocol: TC     Cycle 1, Day 1  Previous treatment = ddAC / ddpaclitaxel c XRT 2012    Regimen: docetaxel (Taxotere) + cyclophosphamide (Cytoxan)  Docetaxel (Taxotere) 75 mg/m2 IV over 60 min D1  Cyclophosphamide (Cytoxan) 600 mg/m2 IV over 30 min D1   Q21Day x 4 cycles      *Dosing Reference*  1. Oral S, et al. Docetaxel With Cyclophosphamide Is Associated With an Overall Survival Benefit Compared With Doxorubicin and Cyclophosphamide: 7-Year Follow-Up of [place] Oncology Research Trial 9735. J Clin Oncol 2009;27:5197-4306.  2. NCCN Guidelines v4.2020 > Invasive Breast Cancer > HER2-Negative - Preferred Regimens  BINV-L, 3 of 6    Allergies:  Patient has no known allergies.     /69   Pulse 94   Temp 36.9 °C (98.4 °F) (Temporal)   Resp 18   Ht 1.54 m (5' 0.63\")   Wt 101.9 kg (224 lb 10.4 oz)   SpO2 94%   BMI 42.97 kg/m²  Body surface area is 2.09 meters squared.    Labs: 5/21/20  Meets treatment parameters; Na+ 133  SCr = 0.37 mg/dL, estCrCl > 60mL/min    Docetaxel (Taxotere) 75 mg/m2 x 2.09 m2 = 156.75 mg   <10% difference, ok to treat with final dose = 156.8 mg   Final [conc] ~ 0.61 mg/mL    Cyclophosphamide (Cytoxan) 600 mg/m2 x 2.09 m2 = 1254 mg   <10% difference, ok to treat with final dose = 1254 mg    "

## 2020-06-03 ENCOUNTER — PATIENT OUTREACH (OUTPATIENT)
Dept: OTHER | Facility: MEDICAL CENTER | Age: 50
End: 2020-06-03

## 2020-06-03 NOTE — PROGRESS NOTES
"On June 3rd, 2020, Oncology Social Worker Michelle Arizmendi contacted pt. via telephone.  Pt. shared she had her first chemotherapy.  Pt. shared the first day of chemo she didn't feel any different but states the following day and for a week she felt awful.  Pt. shared she is feeling much better and preparing for her next chemo next week on the 11th of June.  Pt. shared she did receive the applications SUSAN Arizmendi mailed to her but was told she didn't need to fill them out anymore since she was approved for Emergency Medicaid.  SUSAN Arizmendi explained the [facility] and [facility] applications were to help pay for living expenses.  Pt. agreed and stated she would work on completing applications.  Pt. shared emotionally she is feeling much better now that she has \"fake breast\" prosthesis.  Pt. shared she bought them on payments but they are helping her with her appearance and makes her feel better emotionally.  Pt. shared she has an appointment with her medical oncologist next week on June 9th. Pt. shared they are struggling with the cost of the visits.  SUSAN Arizmendi encouraged pt. to complete the applications and bring them back to [facility].  Pt. agreed.  Pt. shared she did not received the knitted knockers SUSAN Arizmendi and LESLEY Millard had left for pt. to  at [facility].  SUSAN Arizmendi informed pt. she would locate them and leave them for her for next appointment.     "

## 2020-06-05 ENCOUNTER — PATIENT OUTREACH (OUTPATIENT)
Dept: OTHER | Facility: MEDICAL CENTER | Age: 50
End: 2020-06-05

## 2020-06-05 NOTE — PROGRESS NOTES
On June 5th, 2020, Oncology Social Worker Michelle Arizmendi attempted telephone contact with pt. to inform her bag left for her at [facility] is there and when she checks in for her next appointment to let the  know.  OSW Ugo left voicemail message in Welsh and asked for pt. to call her back.

## 2020-06-10 RX ORDER — ONDANSETRON 2 MG/ML
4 INJECTION INTRAMUSCULAR; INTRAVENOUS PRN
Status: CANCELLED | OUTPATIENT
Start: 2020-06-12

## 2020-06-10 RX ORDER — 0.9 % SODIUM CHLORIDE 0.9 %
3 VIAL (ML) INJECTION PRN
Status: CANCELLED | OUTPATIENT
Start: 2020-06-10

## 2020-06-10 RX ORDER — 0.9 % SODIUM CHLORIDE 0.9 %
10 VIAL (ML) INJECTION PRN
Status: CANCELLED | OUTPATIENT
Start: 2020-06-10

## 2020-06-10 RX ORDER — 0.9 % SODIUM CHLORIDE 0.9 %
10 VIAL (ML) INJECTION PRN
Status: CANCELLED | OUTPATIENT
Start: 2020-06-12

## 2020-06-10 RX ORDER — SODIUM CHLORIDE 9 MG/ML
INJECTION, SOLUTION INTRAVENOUS CONTINUOUS
Status: CANCELLED | OUTPATIENT
Start: 2020-06-12

## 2020-06-10 RX ORDER — PROCHLORPERAZINE MALEATE 10 MG
10 TABLET ORAL EVERY 6 HOURS PRN
Status: CANCELLED | OUTPATIENT
Start: 2020-06-12

## 2020-06-10 RX ORDER — 0.9 % SODIUM CHLORIDE 0.9 %
VIAL (ML) INJECTION PRN
Status: CANCELLED | OUTPATIENT
Start: 2020-06-10

## 2020-06-10 RX ORDER — 0.9 % SODIUM CHLORIDE 0.9 %
VIAL (ML) INJECTION PRN
Status: CANCELLED | OUTPATIENT
Start: 2020-06-12

## 2020-06-10 RX ORDER — 0.9 % SODIUM CHLORIDE 0.9 %
3 VIAL (ML) INJECTION PRN
Status: CANCELLED | OUTPATIENT
Start: 2020-06-12

## 2020-06-10 RX ORDER — ONDANSETRON 8 MG/1
8 TABLET, ORALLY DISINTEGRATING ORAL PRN
Status: CANCELLED | OUTPATIENT
Start: 2020-06-12

## 2020-06-11 ENCOUNTER — PATIENT OUTREACH (OUTPATIENT)
Dept: OTHER | Facility: MEDICAL CENTER | Age: 50
End: 2020-06-11

## 2020-06-11 ENCOUNTER — DOCUMENTATION (OUTPATIENT)
Dept: NUTRITION | Facility: MEDICAL CENTER | Age: 50
End: 2020-06-11

## 2020-06-11 ENCOUNTER — OUTPATIENT INFUSION SERVICES (OUTPATIENT)
Dept: ONCOLOGY | Facility: MEDICAL CENTER | Age: 50
End: 2020-06-11
Attending: INTERNAL MEDICINE
Payer: COMMERCIAL

## 2020-06-11 VITALS
DIASTOLIC BLOOD PRESSURE: 78 MMHG | BODY MASS INDEX: 42.87 KG/M2 | HEART RATE: 89 BPM | SYSTOLIC BLOOD PRESSURE: 124 MMHG | TEMPERATURE: 97.6 F | HEIGHT: 61 IN | OXYGEN SATURATION: 96 % | RESPIRATION RATE: 18 BRPM | WEIGHT: 227.07 LBS

## 2020-06-11 DIAGNOSIS — C50.411 MALIGNANT NEOPLASM OF UPPER-OUTER QUADRANT OF RIGHT BREAST IN FEMALE, ESTROGEN RECEPTOR POSITIVE (HCC): ICD-10-CM

## 2020-06-11 DIAGNOSIS — Z17.0 MALIGNANT NEOPLASM OF UPPER-OUTER QUADRANT OF RIGHT BREAST IN FEMALE, ESTROGEN RECEPTOR POSITIVE (HCC): ICD-10-CM

## 2020-06-11 LAB
ALBUMIN SERPL BCP-MCNC: 3.8 G/DL (ref 3.2–4.9)
ALBUMIN/GLOB SERPL: 1.2 G/DL
ALP SERPL-CCNC: 82 U/L (ref 30–99)
ALT SERPL-CCNC: 28 U/L (ref 2–50)
ANION GAP SERPL CALC-SCNC: 9 MMOL/L (ref 7–16)
AST SERPL-CCNC: 23 U/L (ref 12–45)
BASOPHILS # BLD AUTO: 0.8 % (ref 0–1.8)
BASOPHILS # BLD: 0.08 K/UL (ref 0–0.12)
BILIRUB SERPL-MCNC: 0.2 MG/DL (ref 0.1–1.5)
BUN SERPL-MCNC: 14 MG/DL (ref 8–22)
CALCIUM SERPL-MCNC: 9 MG/DL (ref 8.5–10.5)
CHLORIDE SERPL-SCNC: 104 MMOL/L (ref 96–112)
CO2 SERPL-SCNC: 25 MMOL/L (ref 20–33)
CREAT SERPL-MCNC: 0.34 MG/DL (ref 0.5–1.4)
EOSINOPHIL # BLD AUTO: 0.07 K/UL (ref 0–0.51)
EOSINOPHIL NFR BLD: 0.7 % (ref 0–6.9)
ERYTHROCYTE [DISTWIDTH] IN BLOOD BY AUTOMATED COUNT: 39.8 FL (ref 35.9–50)
GLOBULIN SER CALC-MCNC: 3.2 G/DL (ref 1.9–3.5)
GLUCOSE SERPL-MCNC: 133 MG/DL (ref 65–99)
HCT VFR BLD AUTO: 38.3 % (ref 37–47)
HGB BLD-MCNC: 12.5 G/DL (ref 12–16)
IMM GRANULOCYTES # BLD AUTO: 0.07 K/UL (ref 0–0.11)
IMM GRANULOCYTES NFR BLD AUTO: 0.7 % (ref 0–0.9)
LYMPHOCYTES # BLD AUTO: 3.28 K/UL (ref 1–4.8)
LYMPHOCYTES NFR BLD: 31.6 % (ref 22–41)
MCH RBC QN AUTO: 28.4 PG (ref 27–33)
MCHC RBC AUTO-ENTMCNC: 32.6 G/DL (ref 33.6–35)
MCV RBC AUTO: 87 FL (ref 81.4–97.8)
MONOCYTES # BLD AUTO: 0.76 K/UL (ref 0–0.85)
MONOCYTES NFR BLD AUTO: 7.3 % (ref 0–13.4)
NEUTROPHILS # BLD AUTO: 6.12 K/UL (ref 2–7.15)
NEUTROPHILS NFR BLD: 58.9 % (ref 44–72)
NRBC # BLD AUTO: 0 K/UL
NRBC BLD-RTO: 0 /100 WBC
PLATELET # BLD AUTO: 379 K/UL (ref 164–446)
PMV BLD AUTO: 9.5 FL (ref 9–12.9)
POTASSIUM SERPL-SCNC: 3.7 MMOL/L (ref 3.6–5.5)
PROT SERPL-MCNC: 7 G/DL (ref 6–8.2)
RBC # BLD AUTO: 4.4 M/UL (ref 4.2–5.4)
SODIUM SERPL-SCNC: 138 MMOL/L (ref 135–145)
WBC # BLD AUTO: 10.4 K/UL (ref 4.8–10.8)

## 2020-06-11 PROCEDURE — 96417 CHEMO IV INFUS EACH ADDL SEQ: CPT

## 2020-06-11 PROCEDURE — A4212 NON CORING NEEDLE OR STYLET: HCPCS

## 2020-06-11 PROCEDURE — 80053 COMPREHEN METABOLIC PANEL: CPT

## 2020-06-11 PROCEDURE — 304540 HCHG NITRO SET VENT 2ND TUB

## 2020-06-11 PROCEDURE — 96375 TX/PRO/DX INJ NEW DRUG ADDON: CPT

## 2020-06-11 PROCEDURE — 96413 CHEMO IV INFUSION 1 HR: CPT

## 2020-06-11 PROCEDURE — 96415 CHEMO IV INFUSION ADDL HR: CPT

## 2020-06-11 PROCEDURE — 85025 COMPLETE CBC W/AUTO DIFF WBC: CPT

## 2020-06-11 PROCEDURE — 700105 HCHG RX REV CODE 258: Performed by: INTERNAL MEDICINE

## 2020-06-11 PROCEDURE — 700111 HCHG RX REV CODE 636 W/ 250 OVERRIDE (IP): Performed by: INTERNAL MEDICINE

## 2020-06-11 RX ADMIN — HEPARIN 500 UNITS: 100 SYRINGE at 14:56

## 2020-06-11 RX ADMIN — CYCLOPHOSPHAMIDE 1260 MG: 1 INJECTION, POWDER, FOR SOLUTION INTRAVENOUS; ORAL at 13:44

## 2020-06-11 RX ADMIN — DOCETAXEL 157.6 MG: 20 INJECTION, SOLUTION, CONCENTRATE INTRAVENOUS at 11:36

## 2020-06-11 RX ADMIN — DEXAMETHASONE SODIUM PHOSPHATE 12 MG: 4 INJECTION, SOLUTION INTRA-ARTICULAR; INTRALESIONAL; INTRAMUSCULAR; INTRAVENOUS; SOFT TISSUE at 10:47

## 2020-06-11 RX ADMIN — ONDANSETRON HYDROCHLORIDE 16 MG: 2 INJECTION, SOLUTION INTRAMUSCULAR; INTRAVENOUS at 11:00

## 2020-06-11 ASSESSMENT — FIBROSIS 4 INDEX: FIB4 SCORE: 0.48

## 2020-06-11 NOTE — PROGRESS NOTES
Chemotherapy Verification - PRIMARY RN      Height = 154cm  Weight = 103kg  BSA = 2.099 or 2.1m2       Medication: Docetaxel  Dose: 75mg/m2  Calculated Dose: 157.5mg                             (In mg/m2, AUC, mg/kg)     Medication: Cyclophosphamide  Dose: 600mg/m2  Calculated Dose: 1260mg                             (In mg/m2, AUC, mg/kg)      I confirm this process was performed independently with the BSA and all final chemotherapy dosing calculations congruent.  Any discrepancies of 10% or greater have been addressed with the chemotherapy pharmacist. The resolution of the discrepancy has been documented in the EPIC progress notes.

## 2020-06-11 NOTE — PROGRESS NOTES
On June 11, 2020, Oncology Social Worker Michelle Arizmendi processed and submitted [facility] completed application on pt.'s behalf for processing.

## 2020-06-11 NOTE — PROGRESS NOTES
Pt arrived ambulatory to [facility] for Day 1/ Cycle 2 of Docetaxel/Cyclophosphamide.  Charlie, #[id number], used today for RN assessment and POC for today. Pt denied fever, signs or symptoms of infection or acute illness today. Pt states via  she did not take her dexamethasone yesterday because she forgot however states she will take it at home per provider instructions. Pt verbalized understanding of POC for today. Dr. Tabor notified by Hoda Beltran, charge RN, regarding pt did not take her dexamethasone yesterday; no new orders received.     Port accessed without difficulty and labs drawn at this time; pt tolerated well. Labs reviewed. Premedications and chemotherapy administered per provider orders. Second infusion of Docetaxel for pt today, and chemotherapy titrated slowly; pt tolerated well. No signs or symptoms of reaction or complications noted.     Port flushed per policy and de-accessed with needle intact; pt tolerated well. Gauze and paper tape applied at this time. Follow-up care and next appointment printed for pt and pt verbalized understanding. Pt discharged home to self care in no apparent distress at this time. Note sent home with pt as a reminder to take her dexamethasone per provider orders.

## 2020-06-11 NOTE — PROGRESS NOTES
"Nutrition Services: Brief Update  Weight: 103 kg, 227 lbs, weighed today in Infusion  Weight History:  224 lbs on 5/21/20    Weight Change: wt increased by 3 lbs within past 3 weeks.     RD able to visit pt during Infusion appointment. RD utilized interpretive services for [place]: Manas [id number]. Pt states is doing well and maintaining good intake. Relays eating rice, homemade soups, and lots of watermelon and grapes. Relays drinking water, soda, and coffee. States has occasional constipation and diarrhea on and off. Mentions is taking a fiber supplement, though RD unable to understand which kind. States will also have stomach aches that last about 1.5 weeks after treatment. States will like to eat \"fresh things\", during this time. Pt agrees this is mainly fruits and sometimes soups. States will also have oranges or pineapple and adds chili powder to it. Denies additional concerns or questions for RD.     Plan/Recommend:  • RD discussed what to eat during episodes of diarrhea or constipation. Advised adequate water intake for hydration and encouraged soluble fiber. Discussed food sources.   • Recommended daily physical activity. Suggested walking after meals to promote adequate digestions and maintain LBM.  • Discussed citrus fruits and chili powder may worsen stomach ache, recommended to add alongside rice or toast to minimize stomach discomfort if choosing to consume.   • Recommended ginger tea to help alleviate upset stomach  • Encouraged pt to reach out if has questions or concerns. Discussed letting RN know during appointments if would like RD to visit.     Pt verbalized understanding.  RD to remain available PRN. Please consult as needed.   Please contact [phone]    "

## 2020-06-11 NOTE — PROGRESS NOTES
Chemotherapy Verification - SECONDARY RN     C2    Height = 154 cm  Weight = 103 kg  BSA = 2.1 m2       Medication: Docetaxel (Taxotere)  Dose: 75 mg/m2  Calculated Dose: 157.5 mg                                 Medication: Cyclophosphamide (Cytoxan)  Dose: 600 mg/m2  Calculated Dose: 1260 mg                              I confirm that this process was performed independently.

## 2020-06-11 NOTE — PROGRESS NOTES
"Pharmacy Chemotherapy calculation:  Patient Name: Zahida Davis   Dx: recurrent breast cancer: invasive ductal carcinoma, grade 2  > right breast: ER 80%, NC 60%; Ki-67 10%; HER-2/ruel FISH negative  Protocol: OP Breast Docetaxel + Cyclophosphamide     Cycle 2, Day 1  Previous treatment = 5/21/20    Regimen: docetaxel (Taxotere) + cyclophosphamide (Cytoxan)  Docetaxel (Taxotere) 75 mg/m2 IV over 60 min D1  Cyclophosphamide (Cytoxan) 600 mg/m2 IV over 30 min D1   Q21Day x 4 cycles      *Dosing Reference*  1. Oral S, et al. Docetaxel With Cyclophosphamide Is Associated With an Overall Survival Benefit Compared With Doxorubicin and Cyclophosphamide: 7-Year Follow-Up of [place] Oncology Research Trial 9735. J Clin Oncol 2009;27:1065-5084.  2. NCCN Guidelines v4.2020 > Invasive Breast Cancer > HER2-Negative - Preferred Regimens  BINV-L, 3 of 6    Allergies:  Patient has no known allergies.     /78   Pulse 89   Temp 36.4 °C (97.6 °F) (Temporal)   Resp 18   Ht 1.54 m (5' 0.63\")   Wt 103 kg (227 lb 1.2 oz)   SpO2 96%   BMI 43.43 kg/m²  Body surface area is 2.1 meters squared.    Labs: 6/11/20  Meets treatment parameters  SCr = 0.34 mg/dL, estCrCl > 60mL/min    Docetaxel (Taxotere) 75 mg/m2 x 2.1 m2 = 157.5 mg   <5% difference, ok to treat with final dose = 157.6 mg   Final [conc] ~ 0.61 mg/mL    Cyclophosphamide (Cytoxan) 600 mg/m2 x 2.1 m2 = 1260 mg   <5% difference, ok to treat with final dose = 1260 mg    "

## 2020-06-11 NOTE — PROGRESS NOTES
"Pharmacy Chemotherapy Calculation:  Patient Name: Zahida Davis   Dx: Breast CA Stage IIIA ER+/TN+, HER2-       Protocol: TC     *Dosing Reference*  Docetaxel 75 mg/m2 IV over 60 minutes on Day 1  Cyclophosphamide 600 mg/m2 IV over 30 minutes on Day 1  21-day cycle for 4 cycles  NCCN Guidelines for Breast Cancer V.1.2019.  Oral DENNEY, et al. J Clin Oncol. 2009;27(8):1177-83.        Allergies:  Patient has no known allergies.     /78   Pulse 89   Temp 36.4 °C (97.6 °F) (Temporal)   Resp 18   Ht 1.54 m (5' 0.63\")   Wt 103 kg (227 lb 1.2 oz)   SpO2 96%   BMI 43.43 kg/m²  Body surface area is 2.1 meters squared.    Labs 6/11/20:  ANC~ 6120 Plt = 379k   Hgb = 12.5     SCr = 0.34 mg/dL CrCl > 125 mL/min   LFT's = WML TBili = 0.2      Drug Order   (Drug name, dose, route, IV Fluid & volume, frequency, number of doses) Cycle 2     Previous treatment: C1 5/21/20     Medication = Docetaxel (Taxotere)  Base Dose= 75 mg/m2  Calc Dose: Base Dose x 2.1 m2 = 157.5 mg  Final Dose = 157.6 mg  Route = IV  Fluid & Volume =  mL  Admin Duration = Over 60 minutes          <10% difference, okay to treat with final dose   Medication = Cyclophosphamide (Cytoxan)  Base Dose= 600 mg/m2  Calc Dose: Base Dose x 2.1 m2 = 1260 mg  Final Dose = 1260 mg  Route = IV  Fluid & Volume =  mL  Admin Duration = Over 30 minutes          <10% difference, okay to treat with final dose     By my signature below, I confirm this process was performed independently with the BSA and all final chemotherapy dosing calculations congruent. I have reviewed the above chemotherapy order and that my calculation of the final dose and BSA (when applicable) corroborate those calculations of the  pharmacist. Discrepancies of 10% or greater in the written dose have been addressed and documented within the [facility] Progress notes.    Maureen Gilmore, PharmD    "

## 2020-06-15 ENCOUNTER — PATIENT OUTREACH (OUTPATIENT)
Dept: OTHER | Facility: MEDICAL CENTER | Age: 50
End: 2020-06-15

## 2020-06-15 NOTE — PROGRESS NOTES
On Natali 15, 2020, Oncology Social Worker Michelle Arizmendi processed and submitted [facility] completed application on pt.'s behalf to [facility] for review.

## 2020-06-29 RX ORDER — 0.9 % SODIUM CHLORIDE 0.9 %
10 VIAL (ML) INJECTION PRN
Status: CANCELLED | OUTPATIENT
Start: 2020-07-09

## 2020-06-29 RX ORDER — 0.9 % SODIUM CHLORIDE 0.9 %
VIAL (ML) INJECTION PRN
Status: CANCELLED | OUTPATIENT
Start: 2020-07-09

## 2020-06-29 RX ORDER — ONDANSETRON 2 MG/ML
4 INJECTION INTRAMUSCULAR; INTRAVENOUS PRN
Status: CANCELLED | OUTPATIENT
Start: 2020-07-09

## 2020-06-29 RX ORDER — 0.9 % SODIUM CHLORIDE 0.9 %
3 VIAL (ML) INJECTION PRN
Status: CANCELLED | OUTPATIENT
Start: 2020-07-09

## 2020-06-29 RX ORDER — ONDANSETRON 8 MG/1
8 TABLET, ORALLY DISINTEGRATING ORAL PRN
Status: CANCELLED | OUTPATIENT
Start: 2020-07-09

## 2020-06-29 RX ORDER — HEPARIN SODIUM (PORCINE) LOCK FLUSH IV SOLN 100 UNIT/ML 100 UNIT/ML
500 SOLUTION INTRAVENOUS PRN
Status: CANCELLED | OUTPATIENT
Start: 2020-07-09

## 2020-06-29 RX ORDER — PROCHLORPERAZINE MALEATE 10 MG
10 TABLET ORAL EVERY 6 HOURS PRN
Status: CANCELLED | OUTPATIENT
Start: 2020-07-09

## 2020-06-29 RX ORDER — SODIUM CHLORIDE 9 MG/ML
INJECTION, SOLUTION INTRAVENOUS CONTINUOUS
Status: CANCELLED | OUTPATIENT
Start: 2020-07-09

## 2020-07-02 ENCOUNTER — HOSPITAL ENCOUNTER (OUTPATIENT)
Dept: RADIOLOGY | Facility: MEDICAL CENTER | Age: 50
End: 2020-07-02
Attending: INTERNAL MEDICINE
Payer: COMMERCIAL

## 2020-07-02 ENCOUNTER — APPOINTMENT (OUTPATIENT)
Dept: ONCOLOGY | Facility: MEDICAL CENTER | Age: 50
End: 2020-07-02
Attending: INTERNAL MEDICINE

## 2020-07-02 DIAGNOSIS — C50.411 MALIGNANT NEOPLASM OF UPPER-OUTER QUADRANT OF RIGHT FEMALE BREAST, UNSPECIFIED ESTROGEN RECEPTOR STATUS (HCC): ICD-10-CM

## 2020-07-02 DIAGNOSIS — C50.912 MALIGNANT NEOPLASM OF LEFT FEMALE BREAST, UNSPECIFIED ESTROGEN RECEPTOR STATUS, UNSPECIFIED SITE OF BREAST (HCC): ICD-10-CM

## 2020-07-02 PROCEDURE — 71275 CT ANGIOGRAPHY CHEST: CPT

## 2020-07-02 PROCEDURE — 700117 HCHG RX CONTRAST REV CODE 255: Performed by: INTERNAL MEDICINE

## 2020-07-02 PROCEDURE — 93970 EXTREMITY STUDY: CPT

## 2020-07-02 PROCEDURE — 93971 EXTREMITY STUDY: CPT | Mod: RT

## 2020-07-02 RX ADMIN — IOHEXOL 100 ML: 350 INJECTION, SOLUTION INTRAVENOUS at 17:15

## 2020-07-09 ENCOUNTER — OUTPATIENT INFUSION SERVICES (OUTPATIENT)
Dept: ONCOLOGY | Facility: MEDICAL CENTER | Age: 50
End: 2020-07-09
Attending: INTERNAL MEDICINE
Payer: COMMERCIAL

## 2020-07-09 VITALS
HEART RATE: 90 BPM | RESPIRATION RATE: 18 BRPM | WEIGHT: 233.69 LBS | SYSTOLIC BLOOD PRESSURE: 128 MMHG | BODY MASS INDEX: 44.12 KG/M2 | DIASTOLIC BLOOD PRESSURE: 59 MMHG | TEMPERATURE: 98 F | OXYGEN SATURATION: 96 % | HEIGHT: 61 IN

## 2020-07-09 DIAGNOSIS — C50.411 MALIGNANT NEOPLASM OF UPPER-OUTER QUADRANT OF RIGHT BREAST IN FEMALE, ESTROGEN RECEPTOR POSITIVE (HCC): ICD-10-CM

## 2020-07-09 DIAGNOSIS — Z17.0 MALIGNANT NEOPLASM OF UPPER-OUTER QUADRANT OF RIGHT BREAST IN FEMALE, ESTROGEN RECEPTOR POSITIVE (HCC): ICD-10-CM

## 2020-07-09 LAB
ALBUMIN SERPL BCP-MCNC: 4 G/DL (ref 3.2–4.9)
ALBUMIN/GLOB SERPL: 1.3 G/DL
ALP SERPL-CCNC: 86 U/L (ref 30–99)
ALT SERPL-CCNC: 25 U/L (ref 2–50)
ANION GAP SERPL CALC-SCNC: 15 MMOL/L (ref 7–16)
AST SERPL-CCNC: 15 U/L (ref 12–45)
BASOPHILS # BLD AUTO: 0.2 % (ref 0–1.8)
BASOPHILS # BLD: 0.02 K/UL (ref 0–0.12)
BILIRUB SERPL-MCNC: 0.2 MG/DL (ref 0.1–1.5)
BUN SERPL-MCNC: 12 MG/DL (ref 8–22)
CALCIUM SERPL-MCNC: 9.4 MG/DL (ref 8.5–10.5)
CHLORIDE SERPL-SCNC: 102 MMOL/L (ref 96–112)
CO2 SERPL-SCNC: 22 MMOL/L (ref 20–33)
CREAT SERPL-MCNC: 0.62 MG/DL (ref 0.5–1.4)
EOSINOPHIL # BLD AUTO: 0 K/UL (ref 0–0.51)
EOSINOPHIL NFR BLD: 0 % (ref 0–6.9)
ERYTHROCYTE [DISTWIDTH] IN BLOOD BY AUTOMATED COUNT: 41.9 FL (ref 35.9–50)
GLOBULIN SER CALC-MCNC: 3.2 G/DL (ref 1.9–3.5)
GLUCOSE SERPL-MCNC: 270 MG/DL (ref 65–99)
HCT VFR BLD AUTO: 36.1 % (ref 37–47)
HGB BLD-MCNC: 12 G/DL (ref 12–16)
IMM GRANULOCYTES # BLD AUTO: 0.03 K/UL (ref 0–0.11)
IMM GRANULOCYTES NFR BLD AUTO: 0.3 % (ref 0–0.9)
LYMPHOCYTES # BLD AUTO: 1.85 K/UL (ref 1–4.8)
LYMPHOCYTES NFR BLD: 15.5 % (ref 22–41)
MCH RBC QN AUTO: 28.2 PG (ref 27–33)
MCHC RBC AUTO-ENTMCNC: 33.2 G/DL (ref 33.6–35)
MCV RBC AUTO: 84.7 FL (ref 81.4–97.8)
MONOCYTES # BLD AUTO: 0.49 K/UL (ref 0–0.85)
MONOCYTES NFR BLD AUTO: 4.1 % (ref 0–13.4)
NEUTROPHILS # BLD AUTO: 9.51 K/UL (ref 2–7.15)
NEUTROPHILS NFR BLD: 79.9 % (ref 44–72)
NRBC # BLD AUTO: 0 K/UL
NRBC BLD-RTO: 0 /100 WBC
PLATELET # BLD AUTO: 386 K/UL (ref 164–446)
PMV BLD AUTO: 9.7 FL (ref 9–12.9)
POTASSIUM SERPL-SCNC: 3.7 MMOL/L (ref 3.6–5.5)
PROT SERPL-MCNC: 7.2 G/DL (ref 6–8.2)
RBC # BLD AUTO: 4.26 M/UL (ref 4.2–5.4)
SODIUM SERPL-SCNC: 139 MMOL/L (ref 135–145)
WBC # BLD AUTO: 11.9 K/UL (ref 4.8–10.8)

## 2020-07-09 PROCEDURE — 80053 COMPREHEN METABOLIC PANEL: CPT

## 2020-07-09 PROCEDURE — 700111 HCHG RX REV CODE 636 W/ 250 OVERRIDE (IP): Performed by: INTERNAL MEDICINE

## 2020-07-09 PROCEDURE — 700105 HCHG RX REV CODE 258: Performed by: INTERNAL MEDICINE

## 2020-07-09 PROCEDURE — 96417 CHEMO IV INFUS EACH ADDL SEQ: CPT

## 2020-07-09 PROCEDURE — 96413 CHEMO IV INFUSION 1 HR: CPT

## 2020-07-09 PROCEDURE — A4212 NON CORING NEEDLE OR STYLET: HCPCS

## 2020-07-09 PROCEDURE — 304540 HCHG NITRO SET VENT 2ND TUB

## 2020-07-09 PROCEDURE — 96375 TX/PRO/DX INJ NEW DRUG ADDON: CPT

## 2020-07-09 PROCEDURE — 700111 HCHG RX REV CODE 636 W/ 250 OVERRIDE (IP)

## 2020-07-09 PROCEDURE — 85025 COMPLETE CBC W/AUTO DIFF WBC: CPT

## 2020-07-09 RX ORDER — HEPARIN SODIUM (PORCINE) LOCK FLUSH IV SOLN 100 UNIT/ML 100 UNIT/ML
SOLUTION INTRAVENOUS
Status: COMPLETED
Start: 2020-07-09 | End: 2020-07-09

## 2020-07-09 RX ORDER — ATORVASTATIN CALCIUM 10 MG/1
10 TABLET, FILM COATED ORAL NIGHTLY
COMMUNITY

## 2020-07-09 RX ORDER — CIPROFLOXACIN 500 MG/1
TABLET, FILM COATED ORAL
COMMUNITY
Start: 2020-05-28 | End: 2020-07-09

## 2020-07-09 RX ORDER — OMEPRAZOLE 40 MG/1
CAPSULE, DELAYED RELEASE ORAL
COMMUNITY
Start: 2020-05-28 | End: 2020-08-25

## 2020-07-09 RX ADMIN — CYCLOPHOSPHAMIDE 1278 MG: 1 INJECTION, POWDER, FOR SOLUTION INTRAVENOUS; ORAL at 17:26

## 2020-07-09 RX ADMIN — HEPARIN 100 UNITS: 100 SYRINGE at 18:42

## 2020-07-09 RX ADMIN — ONDANSETRON 16 MG: 2 INJECTION INTRAMUSCULAR; INTRAVENOUS at 15:36

## 2020-07-09 RX ADMIN — DOCETAXEL 159.8 MG: 20 INJECTION, SOLUTION, CONCENTRATE INTRAVENOUS at 16:10

## 2020-07-09 RX ADMIN — DEXAMETHASONE SODIUM PHOSPHATE 12 MG: 4 INJECTION, SOLUTION INTRAMUSCULAR; INTRAVENOUS at 15:16

## 2020-07-09 ASSESSMENT — PAIN SCALES - GENERAL: PAINLEVEL: 7=MODERATE-SEVERE PAIN

## 2020-07-09 ASSESSMENT — FIBROSIS 4 INDEX: FIB4 SCORE: 0.56

## 2020-07-09 NOTE — PROGRESS NOTES
"Pharmacy Chemotherapy calculation:  Patient Name: Zahida Davis   Dx: recurrent breast cancer: invasive ductal carcinoma, grade 2  > right breast: ER 80%, [place] 60%; Ki-67 10%; HER-2/ruel FISH negative  Protocol: OP Breast Docetaxel + Cyclophosphamide     Regimen: docetaxel (Taxotere) + cyclophosphamide (Cytoxan)  Docetaxel (Taxotere) 75 mg/m2 IV over 60 min D1  Cyclophosphamide (Cytoxan) 600 mg/m2 IV over 30 min D1   Q21Day x 4 cycles      *Dosing Reference*  1. Oral BOND, et al. Docetaxel With Cyclophosphamide Is Associated With an Overall Survival Benefit Compared With Doxorubicin and Cyclophosphamide: 7-Year Follow-Up of [place] Oncology Research Trial 9735. J Clin Oncol 2009;27:0698-6771.  2. NCCN Guidelines v4.2020 > Invasive Breast Cancer > HER2-Negative - Preferred Regimens  BINV-L, 3 of 6    Allergies:  Patient has no known allergies.     /59   Pulse 90   Temp 36.7 °C (98 °F) (Temporal)   Resp 18   Ht 1.54 m (5' 0.63\")   Wt 106 kg (233 lb 11 oz)   SpO2 96%   BMI 44.70 kg/m²  Body surface area is 2.13 meters squared.    Labs: 7/9/20  Meets treatment parameters  SCr = 0.62 mg/dL, estCrCl > 60 mL/min    Drug Order   (Drug name, dose, route, IV Fluid & volume, frequency, number of doses) Cycle 3     Previous treatment: 6/11/20      Medication = Docetaxel (Taxotere)  Base Dose= 75 mg/m2  Calc Dose: Base Dose x 2.13 m2 = 159.8 mg  Final Dose = 159.8 mg  Final [conc] ~ 0.62 mg/mL  Route = IV  Fluid & Volume =  mL  Admin Duration = Over 60 minutes            <10% difference, okay to treat with final dose   Medication = Cyclophosphamide (Cytoxan)  Base Dose= 600 mg/m2  Calc Dose: Base Dose x 2.1 m2 = 1278 mg  Final Dose = 1278 mg  Route = IV  Fluid & Volume =  mL  Admin Duration = Over 30 minutes            <10% difference, okay to treat with final dose       "

## 2020-07-09 NOTE — PROGRESS NOTES
Chemotherapy Verification - SECONDARY RN       Height = 154 cm  Weight = 106 kg  BSA = 2.13 m2       Medication: Taxotere  Dose: 75 mg/m2  Calculated Dose: 159.75 mg                             (In mg/m2, AUC, mg/kg)     Medication: Cytoxan  Dose: 600 mg/m2  Calculated Dose: 1278 mg                             (In mg/m2, AUC, mg/kg)    I confirm that this process was performed independently.

## 2020-07-09 NOTE — PROGRESS NOTES
Chemotherapy Verification - PRIMARY RN      Height = 154 cm  Weight = 106 kg  BSA = 2.13 m^2       Medication: DOCEtaxel (TAXOTERE)  Dose: 75 mg/m^2  Calculated Dose: 159.75 mg                             (In mg/m2, AUC, mg/kg)     Medication: Cyclophosphamide (CYTOXAN)  Dose: 600 mg/m^2  Calculated Dose: 1278 mg                             (In mg/m2, AUC, mg/kg)    I confirm this process was performed independently with the BSA and all final chemotherapy dosing calculations congruent.  Any discrepancies of 10% or greater have been addressed with the chemotherapy pharmacist. The resolution of the discrepancy has been documented in the EPIC progress notes.

## 2020-07-09 NOTE — PROGRESS NOTES
"Pharmacy Chemotherapy calculation:  Patient Name: Zahida Davis   Dx: recurrent breast cancer: invasive ductal carcinoma, grade 2  > right breast: ER 80%, DC 60%; Ki-67 10%; HER-2/ruel FISH negative    Cycle 3  Previous treatment = 6/11/20    Regimen: docetaxel (Taxotere) + cyclophosphamide (Cytoxan)  Docetaxel (Taxotere) 75 mg/m2 IV over 60 min D1  Cyclophosphamide (Cytoxan) 600 mg/m2 IV over 30 min D1   Q21Day x 4 cycles  1. Oral BOND et al. Docetaxel With Cyclophosphamide Is Associated With an Overall Survival Benefit Compared With Doxorubicin and Cyclophosphamide: 7-Year Follow-Up of [place] Oncology Research Trial 9735. J Clin Oncol 2009;27:4970-2705.  2. NCCN Guidelines v4.2020 > Invasive Breast Cancer > HER2-Negative - Preferred Regimens  BINV-L, 3 of 6    Allergies:  Patient has no known allergies.     /59   Pulse 90   Temp 36.7 °C (98 °F) (Temporal)   Resp 18   Ht 1.54 m (5' 0.63\")   Wt 106 kg (233 lb 11 oz)   SpO2 96%   BMI 44.70 kg/m²  Body surface area is 2.13 meters squared.    Labs from 7/9/20 reviewed - all within treatment parameters.      Docetaxel (Taxotere) 75 mg/m2 x 2.13 m2 = 159.8 mg   <10% difference, ok to treat with final dose = 159.8 mg    Cyclophosphamide (Cytoxan) 600 mg/m2 x 2.13 m2 = 1278 mg   <10% difference, ok to treat with final dose = 1278 mg    Divya Lazar, PharmD, BCOP    "

## 2020-07-09 NOTE — PROGRESS NOTES
Patient here for Day 1, Cycle 3 DOCEtaxel (TAXOTERE) and Cyclophosphamide (CYTOXAN). Left chest port accessed using sterile technique; labs drawn as ordered. Pre-medications given per MAR. DOCEtaxel (TAXOTERE) given per MAR. Cyclophosphamide (CYTOXAN) given per MAR over 1 hour. Heparin instilled prior to de-accessing port. Port de-accessed; gauze/tape applied to site. Next appointment scheduled. Discharged to self care; no apparent distress noted.

## 2020-07-14 ENCOUNTER — APPOINTMENT (OUTPATIENT)
Dept: RADIOLOGY | Facility: MEDICAL CENTER | Age: 50
DRG: 177 | End: 2020-07-14
Attending: EMERGENCY MEDICINE
Payer: MEDICAID

## 2020-07-14 ENCOUNTER — HOSPITAL ENCOUNTER (INPATIENT)
Facility: MEDICAL CENTER | Age: 50
LOS: 5 days | DRG: 177 | End: 2020-07-20
Attending: EMERGENCY MEDICINE | Admitting: INTERNAL MEDICINE
Payer: MEDICAID

## 2020-07-14 DIAGNOSIS — R50.9 FEVER, UNSPECIFIED FEVER CAUSE: ICD-10-CM

## 2020-07-14 DIAGNOSIS — D70.9 NEUTROPENIA, UNSPECIFIED TYPE (HCC): ICD-10-CM

## 2020-07-14 DIAGNOSIS — R74.01 ELEVATED TRANSAMINASE LEVEL: ICD-10-CM

## 2020-07-14 DIAGNOSIS — Z85.3 HISTORY OF BREAST CANCER: ICD-10-CM

## 2020-07-14 LAB
ALBUMIN SERPL BCP-MCNC: 3.9 G/DL (ref 3.2–4.9)
ALBUMIN/GLOB SERPL: 1.3 G/DL
ALP SERPL-CCNC: 83 U/L (ref 30–99)
ALT SERPL-CCNC: 62 U/L (ref 2–50)
ANION GAP SERPL CALC-SCNC: 13 MMOL/L (ref 7–16)
APTT PPP: 30.5 SEC (ref 24.7–36)
AST SERPL-CCNC: 85 U/L (ref 12–45)
BASOPHILS # BLD AUTO: 1.1 % (ref 0–1.8)
BASOPHILS # BLD: 0.02 K/UL (ref 0–0.12)
BILIRUB SERPL-MCNC: 0.4 MG/DL (ref 0.1–1.5)
BUN SERPL-MCNC: 9 MG/DL (ref 8–22)
CALCIUM SERPL-MCNC: 9.1 MG/DL (ref 8.5–10.5)
CHLORIDE SERPL-SCNC: 99 MMOL/L (ref 96–112)
CO2 SERPL-SCNC: 25 MMOL/L (ref 20–33)
COVID ORDER STATUS COVID19: NORMAL
CREAT SERPL-MCNC: 0.37 MG/DL (ref 0.5–1.4)
EOSINOPHIL # BLD AUTO: 0.01 K/UL (ref 0–0.51)
EOSINOPHIL NFR BLD: 0.6 % (ref 0–6.9)
ERYTHROCYTE [DISTWIDTH] IN BLOOD BY AUTOMATED COUNT: 42.4 FL (ref 35.9–50)
GLOBULIN SER CALC-MCNC: 3.1 G/DL (ref 1.9–3.5)
GLUCOSE SERPL-MCNC: 139 MG/DL (ref 65–99)
HCT VFR BLD AUTO: 37.7 % (ref 37–47)
HGB BLD-MCNC: 12.7 G/DL (ref 12–16)
IMM GRANULOCYTES # BLD AUTO: 0.01 K/UL (ref 0–0.11)
IMM GRANULOCYTES NFR BLD AUTO: 0.6 % (ref 0–0.9)
INR PPP: 0.92 (ref 0.87–1.13)
LACTATE BLD-SCNC: 2 MMOL/L (ref 0.5–2)
LYMPHOCYTES # BLD AUTO: 0.58 K/UL (ref 1–4.8)
LYMPHOCYTES NFR BLD: 32.4 % (ref 22–41)
MCH RBC QN AUTO: 28.5 PG (ref 27–33)
MCHC RBC AUTO-ENTMCNC: 33.7 G/DL (ref 33.6–35)
MCV RBC AUTO: 84.5 FL (ref 81.4–97.8)
MONOCYTES # BLD AUTO: 0.03 K/UL (ref 0–0.85)
MONOCYTES NFR BLD AUTO: 1.7 % (ref 0–13.4)
NEUTROPHILS # BLD AUTO: 1.14 K/UL (ref 2–7.15)
NEUTROPHILS NFR BLD: 63.6 % (ref 44–72)
NRBC # BLD AUTO: 0 K/UL
NRBC BLD-RTO: 0 /100 WBC
PLATELET # BLD AUTO: 256 K/UL (ref 164–446)
PMV BLD AUTO: 9.8 FL (ref 9–12.9)
POTASSIUM SERPL-SCNC: 4.1 MMOL/L (ref 3.6–5.5)
PROCALCITONIN SERPL-MCNC: 0.05 NG/ML
PROT SERPL-MCNC: 7 G/DL (ref 6–8.2)
PROTHROMBIN TIME: 12.6 SEC (ref 12–14.6)
RBC # BLD AUTO: 4.46 M/UL (ref 4.2–5.4)
SODIUM SERPL-SCNC: 137 MMOL/L (ref 135–145)
WBC # BLD AUTO: 1.8 K/UL (ref 4.8–10.8)

## 2020-07-14 PROCEDURE — 71045 X-RAY EXAM CHEST 1 VIEW: CPT

## 2020-07-14 PROCEDURE — 85730 THROMBOPLASTIN TIME PARTIAL: CPT

## 2020-07-14 PROCEDURE — 80053 COMPREHEN METABOLIC PANEL: CPT

## 2020-07-14 PROCEDURE — 96365 THER/PROPH/DIAG IV INF INIT: CPT

## 2020-07-14 PROCEDURE — 83605 ASSAY OF LACTIC ACID: CPT

## 2020-07-14 PROCEDURE — 99285 EMERGENCY DEPT VISIT HI MDM: CPT

## 2020-07-14 PROCEDURE — U0003 INFECTIOUS AGENT DETECTION BY NUCLEIC ACID (DNA OR RNA); SEVERE ACUTE RESPIRATORY SYNDROME CORONAVIRUS 2 (SARS-COV-2) (CORONAVIRUS DISEASE [COVID-19]), AMPLIFIED PROBE TECHNIQUE, MAKING USE OF HIGH THROUGHPUT TECHNOLOGIES AS DESCRIBED BY CMS-2020-01-R: HCPCS

## 2020-07-14 PROCEDURE — 85025 COMPLETE CBC W/AUTO DIFF WBC: CPT

## 2020-07-14 PROCEDURE — C9803 HOPD COVID-19 SPEC COLLECT: HCPCS | Performed by: EMERGENCY MEDICINE

## 2020-07-14 PROCEDURE — 700105 HCHG RX REV CODE 258: Performed by: EMERGENCY MEDICINE

## 2020-07-14 PROCEDURE — 84145 PROCALCITONIN (PCT): CPT

## 2020-07-14 PROCEDURE — 87040 BLOOD CULTURE FOR BACTERIA: CPT | Mod: 91

## 2020-07-14 PROCEDURE — 85610 PROTHROMBIN TIME: CPT

## 2020-07-14 PROCEDURE — 700111 HCHG RX REV CODE 636 W/ 250 OVERRIDE (IP): Performed by: EMERGENCY MEDICINE

## 2020-07-14 RX ADMIN — CEFEPIME 2 G: 2 INJECTION, POWDER, FOR SOLUTION INTRAVENOUS at 23:22

## 2020-07-14 ASSESSMENT — FIBROSIS 4 INDEX: FIB4 SCORE: 0.38

## 2020-07-15 PROBLEM — R50.81 NEUTROPENIC FEVER (HCC): Status: ACTIVE | Noted: 2020-07-15

## 2020-07-15 PROBLEM — D70.9 NEUTROPENIC FEVER (HCC): Status: ACTIVE | Noted: 2020-07-15

## 2020-07-15 LAB
APPEARANCE UR: CLEAR
APPEARANCE UR: CLEAR
BACTERIA #/AREA URNS HPF: NEGATIVE /HPF
BILIRUB UR QL STRIP.AUTO: NEGATIVE
BILIRUB UR QL STRIP.AUTO: NEGATIVE
COLOR UR: YELLOW
COLOR UR: YELLOW
EPI CELLS #/AREA URNS HPF: NEGATIVE /HPF
GLUCOSE UR STRIP.AUTO-MCNC: NEGATIVE MG/DL
GLUCOSE UR STRIP.AUTO-MCNC: NEGATIVE MG/DL
HYALINE CASTS #/AREA URNS LPF: ABNORMAL /LPF
INR PPP: 1.01 (ref 0.87–1.13)
KETONES UR STRIP.AUTO-MCNC: NEGATIVE MG/DL
KETONES UR STRIP.AUTO-MCNC: NEGATIVE MG/DL
LACTATE BLD-SCNC: 1.8 MMOL/L (ref 0.5–2)
LEUKOCYTE ESTERASE UR QL STRIP.AUTO: NEGATIVE
LEUKOCYTE ESTERASE UR QL STRIP.AUTO: NEGATIVE
MICRO URNS: ABNORMAL
MICRO URNS: NORMAL
NITRITE UR QL STRIP.AUTO: NEGATIVE
NITRITE UR QL STRIP.AUTO: NEGATIVE
PH UR STRIP.AUTO: 7.5 [PH] (ref 5–8)
PH UR STRIP.AUTO: 8 [PH] (ref 5–8)
PROT UR QL STRIP: 30 MG/DL
PROT UR QL STRIP: NEGATIVE MG/DL
PROTHROMBIN TIME: 13.6 SEC (ref 12–14.6)
RBC # URNS HPF: ABNORMAL /HPF
RBC UR QL AUTO: NEGATIVE
RBC UR QL AUTO: NEGATIVE
S PYO DNA SPEC NAA+PROBE: NOT DETECTED
SARS-COV-2 RNA RESP QL NAA+PROBE: DETECTED
SP GR UR STRIP.AUTO: 1
SP GR UR STRIP.AUTO: 1.02
SPECIMEN SOURCE: ABNORMAL
UROBILINOGEN UR STRIP.AUTO-MCNC: 0.2 MG/DL
UROBILINOGEN UR STRIP.AUTO-MCNC: 0.2 MG/DL
WBC #/AREA URNS HPF: ABNORMAL /HPF

## 2020-07-15 PROCEDURE — A9270 NON-COVERED ITEM OR SERVICE: HCPCS | Performed by: INTERNAL MEDICINE

## 2020-07-15 PROCEDURE — 83605 ASSAY OF LACTIC ACID: CPT

## 2020-07-15 PROCEDURE — 81001 URINALYSIS AUTO W/SCOPE: CPT

## 2020-07-15 PROCEDURE — 700105 HCHG RX REV CODE 258: Performed by: INTERNAL MEDICINE

## 2020-07-15 PROCEDURE — 85610 PROTHROMBIN TIME: CPT

## 2020-07-15 PROCEDURE — 700105 HCHG RX REV CODE 258: Performed by: EMERGENCY MEDICINE

## 2020-07-15 PROCEDURE — 700102 HCHG RX REV CODE 250 W/ 637 OVERRIDE(OP): Performed by: EMERGENCY MEDICINE

## 2020-07-15 PROCEDURE — 770021 HCHG ROOM/CARE - ISO PRIVATE

## 2020-07-15 PROCEDURE — A9270 NON-COVERED ITEM OR SERVICE: HCPCS | Performed by: EMERGENCY MEDICINE

## 2020-07-15 PROCEDURE — 36415 COLL VENOUS BLD VENIPUNCTURE: CPT

## 2020-07-15 PROCEDURE — 87651 STREP A DNA AMP PROBE: CPT

## 2020-07-15 PROCEDURE — 700111 HCHG RX REV CODE 636 W/ 250 OVERRIDE (IP): Performed by: INTERNAL MEDICINE

## 2020-07-15 PROCEDURE — 700102 HCHG RX REV CODE 250 W/ 637 OVERRIDE(OP): Performed by: INTERNAL MEDICINE

## 2020-07-15 PROCEDURE — 99222 1ST HOSP IP/OBS MODERATE 55: CPT | Mod: CS | Performed by: INTERNAL MEDICINE

## 2020-07-15 PROCEDURE — 87086 URINE CULTURE/COLONY COUNT: CPT

## 2020-07-15 PROCEDURE — 81003 URINALYSIS AUTO W/O SCOPE: CPT

## 2020-07-15 RX ORDER — SODIUM CHLORIDE, SODIUM LACTATE, POTASSIUM CHLORIDE, AND CALCIUM CHLORIDE .6; .31; .03; .02 G/100ML; G/100ML; G/100ML; G/100ML
500 INJECTION, SOLUTION INTRAVENOUS
Status: DISCONTINUED | OUTPATIENT
Start: 2020-07-15 | End: 2020-07-20 | Stop reason: HOSPADM

## 2020-07-15 RX ORDER — ACETAMINOPHEN 325 MG/1
650 TABLET ORAL EVERY 6 HOURS PRN
Status: DISCONTINUED | OUTPATIENT
Start: 2020-07-15 | End: 2020-07-20 | Stop reason: HOSPADM

## 2020-07-15 RX ORDER — AMOXICILLIN 250 MG
2 CAPSULE ORAL 2 TIMES DAILY
Status: DISCONTINUED | OUTPATIENT
Start: 2020-07-15 | End: 2020-07-20 | Stop reason: HOSPADM

## 2020-07-15 RX ORDER — ATORVASTATIN CALCIUM 10 MG/1
10 TABLET, FILM COATED ORAL NIGHTLY
Status: DISCONTINUED | OUTPATIENT
Start: 2020-07-15 | End: 2020-07-20 | Stop reason: HOSPADM

## 2020-07-15 RX ORDER — OMEPRAZOLE 20 MG/1
40 CAPSULE, DELAYED RELEASE ORAL DAILY
Status: DISCONTINUED | OUTPATIENT
Start: 2020-07-15 | End: 2020-07-20 | Stop reason: HOSPADM

## 2020-07-15 RX ORDER — SODIUM CHLORIDE 9 MG/ML
INJECTION, SOLUTION INTRAVENOUS
Status: ACTIVE
Start: 2020-07-15 | End: 2020-07-15

## 2020-07-15 RX ORDER — PROMETHAZINE HYDROCHLORIDE 25 MG/1
12.5-25 SUPPOSITORY RECTAL EVERY 4 HOURS PRN
Status: DISCONTINUED | OUTPATIENT
Start: 2020-07-15 | End: 2020-07-20 | Stop reason: HOSPADM

## 2020-07-15 RX ORDER — POLYETHYLENE GLYCOL 3350 17 G/17G
1 POWDER, FOR SOLUTION ORAL
Status: DISCONTINUED | OUTPATIENT
Start: 2020-07-15 | End: 2020-07-20 | Stop reason: HOSPADM

## 2020-07-15 RX ORDER — MORPHINE SULFATE 4 MG/ML
4 INJECTION, SOLUTION INTRAMUSCULAR; INTRAVENOUS
Status: DISCONTINUED | OUTPATIENT
Start: 2020-07-15 | End: 2020-07-20 | Stop reason: HOSPADM

## 2020-07-15 RX ORDER — PROMETHAZINE HYDROCHLORIDE 25 MG/1
12.5-25 TABLET ORAL EVERY 4 HOURS PRN
Status: DISCONTINUED | OUTPATIENT
Start: 2020-07-15 | End: 2020-07-20 | Stop reason: HOSPADM

## 2020-07-15 RX ORDER — OXYCODONE HYDROCHLORIDE 10 MG/1
10 TABLET ORAL
Status: DISCONTINUED | OUTPATIENT
Start: 2020-07-15 | End: 2020-07-20 | Stop reason: HOSPADM

## 2020-07-15 RX ORDER — PROCHLORPERAZINE MALEATE 5 MG/1
10 TABLET ORAL EVERY 8 HOURS PRN
Status: DISCONTINUED | OUTPATIENT
Start: 2020-07-15 | End: 2020-07-20 | Stop reason: HOSPADM

## 2020-07-15 RX ORDER — BISACODYL 10 MG
10 SUPPOSITORY, RECTAL RECTAL
Status: DISCONTINUED | OUTPATIENT
Start: 2020-07-15 | End: 2020-07-20 | Stop reason: HOSPADM

## 2020-07-15 RX ORDER — ONDANSETRON 4 MG/1
4 TABLET, ORALLY DISINTEGRATING ORAL EVERY 4 HOURS PRN
Status: DISCONTINUED | OUTPATIENT
Start: 2020-07-15 | End: 2020-07-20 | Stop reason: HOSPADM

## 2020-07-15 RX ORDER — TAMOXIFEN CITRATE 10 MG/1
10 TABLET ORAL EVERY EVENING
Status: DISCONTINUED | OUTPATIENT
Start: 2020-07-15 | End: 2020-07-20 | Stop reason: HOSPADM

## 2020-07-15 RX ORDER — PROCHLORPERAZINE EDISYLATE 5 MG/ML
5-10 INJECTION INTRAMUSCULAR; INTRAVENOUS EVERY 4 HOURS PRN
Status: DISCONTINUED | OUTPATIENT
Start: 2020-07-15 | End: 2020-07-20 | Stop reason: HOSPADM

## 2020-07-15 RX ORDER — ACETAMINOPHEN 325 MG/1
650 TABLET ORAL ONCE
Status: COMPLETED | OUTPATIENT
Start: 2020-07-15 | End: 2020-07-15

## 2020-07-15 RX ORDER — OXYCODONE HYDROCHLORIDE 5 MG/1
5 TABLET ORAL
Status: DISCONTINUED | OUTPATIENT
Start: 2020-07-15 | End: 2020-07-20 | Stop reason: HOSPADM

## 2020-07-15 RX ORDER — SODIUM CHLORIDE, SODIUM LACTATE, POTASSIUM CHLORIDE, CALCIUM CHLORIDE 600; 310; 30; 20 MG/100ML; MG/100ML; MG/100ML; MG/100ML
1000 INJECTION, SOLUTION INTRAVENOUS ONCE
Status: COMPLETED | OUTPATIENT
Start: 2020-07-15 | End: 2020-07-15

## 2020-07-15 RX ORDER — ONDANSETRON 2 MG/ML
4 INJECTION INTRAMUSCULAR; INTRAVENOUS EVERY 4 HOURS PRN
Status: DISCONTINUED | OUTPATIENT
Start: 2020-07-15 | End: 2020-07-20 | Stop reason: HOSPADM

## 2020-07-15 RX ADMIN — CEFEPIME 2 G: 2 INJECTION, POWDER, FOR SOLUTION INTRAVENOUS at 05:27

## 2020-07-15 RX ADMIN — CEFEPIME 2 G: 2 INJECTION, POWDER, FOR SOLUTION INTRAVENOUS at 22:23

## 2020-07-15 RX ADMIN — OXYCODONE HYDROCHLORIDE 10 MG: 10 TABLET ORAL at 19:48

## 2020-07-15 RX ADMIN — SODIUM CHLORIDE, POTASSIUM CHLORIDE, SODIUM LACTATE AND CALCIUM CHLORIDE 1000 ML: 600; 310; 30; 20 INJECTION, SOLUTION INTRAVENOUS at 00:22

## 2020-07-15 RX ADMIN — DOCUSATE SODIUM 50 MG AND SENNOSIDES 8.6 MG 2 TABLET: 8.6; 5 TABLET, FILM COATED ORAL at 19:48

## 2020-07-15 RX ADMIN — CEFEPIME 2 G: 2 INJECTION, POWDER, FOR SOLUTION INTRAVENOUS at 13:54

## 2020-07-15 RX ADMIN — OMEPRAZOLE 40 MG: 20 CAPSULE, DELAYED RELEASE ORAL at 05:27

## 2020-07-15 RX ADMIN — TAMOXIFEN CITRATE 10 MG: 10 TABLET, FILM COATED ORAL at 17:07

## 2020-07-15 RX ADMIN — ENOXAPARIN SODIUM 40 MG: 40 INJECTION SUBCUTANEOUS at 05:27

## 2020-07-15 RX ADMIN — OXYCODONE HYDROCHLORIDE 10 MG: 10 TABLET ORAL at 13:27

## 2020-07-15 RX ADMIN — ATORVASTATIN CALCIUM 10 MG: 10 TABLET, FILM COATED ORAL at 19:48

## 2020-07-15 RX ADMIN — ACETAMINOPHEN 650 MG: 325 TABLET, FILM COATED ORAL at 00:21

## 2020-07-15 SDOH — ECONOMIC STABILITY: TRANSPORTATION INSECURITY
IN THE PAST 12 MONTHS, HAS THE LACK OF TRANSPORTATION KEPT YOU FROM MEDICAL APPOINTMENTS OR FROM GETTING MEDICATIONS?: PATIENT DECLINED

## 2020-07-15 SDOH — ECONOMIC STABILITY: TRANSPORTATION INSECURITY
IN THE PAST 12 MONTHS, HAS LACK OF TRANSPORTATION KEPT YOU FROM MEETINGS, WORK, OR FROM GETTING THINGS NEEDED FOR DAILY LIVING?: PATIENT DECLINED

## 2020-07-15 ASSESSMENT — LIFESTYLE VARIABLES
TOTAL SCORE: 0
AVERAGE NUMBER OF DAYS PER WEEK YOU HAVE A DRINK CONTAINING ALCOHOL: 0
HAVE PEOPLE ANNOYED YOU BY CRITICIZING YOUR DRINKING: NO
SUBSTANCE_ABUSE: 0
ALCOHOL_USE: YES
EVER_SMOKED: NEVER
ON A TYPICAL DAY WHEN YOU DRINK ALCOHOL HOW MANY DRINKS DO YOU HAVE: 1
TOTAL SCORE: 0
HOW MANY TIMES IN THE PAST YEAR HAVE YOU HAD 5 OR MORE DRINKS IN A DAY: 0
CONSUMPTION TOTAL: NEGATIVE
HAVE YOU EVER FELT YOU SHOULD CUT DOWN ON YOUR DRINKING: NO
TOTAL SCORE: 0
EVER HAD A DRINK FIRST THING IN THE MORNING TO STEADY YOUR NERVES TO GET RID OF A HANGOVER: NO
EVER FELT BAD OR GUILTY ABOUT YOUR DRINKING: NO
DOES PATIENT WANT TO STOP DRINKING: NO

## 2020-07-15 ASSESSMENT — ENCOUNTER SYMPTOMS
TREMORS: 0
COUGH: 1
POLYDIPSIA: 0
PHOTOPHOBIA: 0
CHILLS: 1
PALPITATIONS: 0
VOMITING: 0
ORTHOPNEA: 0
BRUISES/BLEEDS EASILY: 0
HEADACHES: 0
HEARTBURN: 0
SORE THROAT: 1
NECK PAIN: 0
SPUTUM PRODUCTION: 1
DOUBLE VISION: 0
FLANK PAIN: 0
HALLUCINATIONS: 0
BLURRED VISION: 0
WEIGHT LOSS: 1
BACK PAIN: 0
FEVER: 0
HEMOPTYSIS: 0
NERVOUS/ANXIOUS: 0
FOCAL WEAKNESS: 0
NAUSEA: 1
SPEECH CHANGE: 0

## 2020-07-15 ASSESSMENT — COGNITIVE AND FUNCTIONAL STATUS - GENERAL
MOBILITY SCORE: 24
DAILY ACTIVITIY SCORE: 24
SUGGESTED CMS G CODE MODIFIER DAILY ACTIVITY: CH
SUGGESTED CMS G CODE MODIFIER MOBILITY: CH

## 2020-07-15 ASSESSMENT — COPD QUESTIONNAIRES
DO YOU EVER COUGH UP ANY MUCUS OR PHLEGM?: YES, A FEW DAYS A WEEK OR MONTH
IN THE PAST 12 MONTHS DO YOU DO LESS THAN YOU USED TO BECAUSE OF YOUR BREATHING PROBLEMS: DISAGREE/UNSURE
COPD SCREENING SCORE: 2
HAVE YOU SMOKED AT LEAST 100 CIGARETTES IN YOUR ENTIRE LIFE: NO/DON'T KNOW
DURING THE PAST 4 WEEKS HOW MUCH DID YOU FEEL SHORT OF BREATH: SOME OF THE TIME

## 2020-07-15 ASSESSMENT — PATIENT HEALTH QUESTIONNAIRE - PHQ9
1. LITTLE INTEREST OR PLEASURE IN DOING THINGS: SEVERAL DAYS
5. POOR APPETITE OR OVEREATING: NOT AT ALL
2. FEELING DOWN, DEPRESSED, IRRITABLE, OR HOPELESS: SEVERAL DAYS
SUM OF ALL RESPONSES TO PHQ QUESTIONS 1-9: 3
SUM OF ALL RESPONSES TO PHQ9 QUESTIONS 1 AND 2: 2
7. TROUBLE CONCENTRATING ON THINGS, SUCH AS READING THE NEWSPAPER OR WATCHING TELEVISION: NOT AT ALL
8. MOVING OR SPEAKING SO SLOWLY THAT OTHER PEOPLE COULD HAVE NOTICED. OR THE OPPOSITE, BEING SO FIGETY OR RESTLESS THAT YOU HAVE BEEN MOVING AROUND A LOT MORE THAN USUAL: NOT AT ALL
9. THOUGHTS THAT YOU WOULD BE BETTER OFF DEAD, OR OF HURTING YOURSELF: NOT AT ALL
4. FEELING TIRED OR HAVING LITTLE ENERGY: SEVERAL DAYS
6. FEELING BAD ABOUT YOURSELF - OR THAT YOU ARE A FAILURE OR HAVE LET YOURSELF OR YOUR FAMILY DOWN: NOT AL ALL
3. TROUBLE FALLING OR STAYING ASLEEP OR SLEEPING TOO MUCH: NOT AT ALL

## 2020-07-15 ASSESSMENT — FIBROSIS 4 INDEX: FIB4 SCORE: 2.07

## 2020-07-15 NOTE — PROGRESS NOTES
Jeni from Lab called with critical result of Covid positive at 1245. Critical lab result read back to Jeni.  Dr. Kumar notified of critical lab result at 1248.  Critical lab result read back by Dr. Kumar.

## 2020-07-15 NOTE — ED NOTES
PT TEMP CONTINUING TO INCREASE. PT STATED TOOK BOTH TYLENOL AND MOTRIN PTA. EP UPDATED. VERBAL ORDER RECEIVED FOR FLUIDS AND TYLENOL

## 2020-07-15 NOTE — H&P
Hospital Medicine History & Physical Note    Date of Service  7/15/2020    Primary Care Physician  AKBAR Henry    Code Status  Full Code    Chief Complaint  Chief Complaint   Patient presents with   • Headache   • Sore Throat   • Cough       History of Presenting Illness  49 y.o. female with history of breast cancer, undergoing chemoradiation, last chemotherapy session was on Thursday according to her, following with Dr. Johnson, who presented 7/14/2020 with complaints of chills, fever, headache, cough with white sputum, sore throat, body aches.  Patient stated cough started about 8 days ago, sore throat and fevers 3 days ago.  She met sepsis criteria with fever in , tachycardia 112, neutropenia with ANC 1100  COVID-19 rule out pending.  She denies recent travel or sick contact.  Review of Systems  Review of Systems   Constitutional: Positive for chills, malaise/fatigue and weight loss. Negative for fever.   HENT: Positive for congestion and sore throat. Negative for ear pain, hearing loss and tinnitus.    Eyes: Negative for blurred vision, double vision and photophobia.   Respiratory: Positive for cough and sputum production. Negative for hemoptysis.    Cardiovascular: Negative for chest pain, palpitations and orthopnea.   Gastrointestinal: Positive for nausea. Negative for heartburn and vomiting.   Genitourinary: Negative for dysuria, flank pain, frequency and hematuria.   Musculoskeletal: Positive for joint pain. Negative for back pain and neck pain.   Skin: Negative for itching and rash.   Neurological: Negative for tremors, speech change, focal weakness and headaches.   Endo/Heme/Allergies: Negative for environmental allergies and polydipsia. Does not bruise/bleed easily.   Psychiatric/Behavioral: Negative for hallucinations and substance abuse. The patient is not nervous/anxious.        Past Medical History   has a past medical history of Arthritis, Cancer (HCC) (2013), Heart burn,  Malignant neoplasm of upper-outer quadrant of right female breast (HCC), Snoring, and Urinary incontinence.    Surgical History   has a past surgical history that includes other (2013); other orthopedic surgery; gyn surgery (1989,1991); pr mastectomy, simple, complete (Left, 4/10/2020); pr mastectomy, modified radical (Right, 4/10/2020); axillary node dissection (Right, 4/10/2020); and cath placement (Left, 4/10/2020).     Family History  family history includes Cancer in her father and another family member.     Social History   reports that she has never smoked. She has never used smokeless tobacco. She reports previous alcohol use. She reports that she does not use drugs.    Allergies  No Known Allergies    Medications  Prior to Admission Medications   Prescriptions Last Dose Informant Patient Reported? Taking?   atorvastatin (LIPITOR) 10 MG Tab   Yes No   Sig: Take 10 mg by mouth every evening.   dexamethasone (DECADRON) 4 MG Tab   Yes No   Sig: Take 8 mg by mouth 2 times a day. Day before and day after taxotere   diphenhydrAMINE HCl (ALLERGY MED PO)   Yes No   Sig: Take 1 Tab by mouth 1 time daily as needed.   lidocaine-prilocaine (EMLA) 2.5-2.5 % Cream   Yes No   Sig: Apply  to affected area(s) as needed.   metFORMIN (GLUCOPHAGE) 500 MG Tab   Yes No   Sig: Take 500 mg by mouth 2 times a day, with meals.   omeprazole (PRILOSEC) 40 MG delayed-release capsule   Yes No   Sig: TAKE ONE CAPSULE BY MOUTH 30 MINUTES BEFORE BREAKFAST FOR STOMACH PAIN   ondansetron (ZOFRAN ODT) 8 MG TABLET DISPERSIBLE   Yes No   Sig: Take 8 mg by mouth every 12 hours as needed for Nausea.   prochlorperazine (COMPAZINE) 10 MG Tab   Yes No   Sig: Take 10 mg by mouth every 8 hours as needed for Nausea/Vomiting.   tamoxifen (NOLVADEX) 10 MG Tab   Yes No   Sig: Take 10 mg by mouth every evening.      Facility-Administered Medications: None       Physical Exam  Temp:  [37.5 °C (99.5 °F)-38.8 °C (101.8 °F)] 37.5 °C (99.5 °F)  Pulse:  []  94  Resp:  [16-23] 16  BP: (120-175)/() 124/58  SpO2:  [94 %-98 %] 94 %    Physical Exam  Vitals signs and nursing note reviewed.   Constitutional:       General: She is not in acute distress.     Appearance: Normal appearance. She is ill-appearing.   HENT:      Head: Normocephalic and atraumatic.      Comments: Posterior pharynx, uvula and tonsils are not visualized on exam due to high Mallampati score     Nose: Nose normal.      Mouth/Throat:      Mouth: Mucous membranes are moist.   Eyes:      Extraocular Movements: Extraocular movements intact.      Pupils: Pupils are equal, round, and reactive to light.   Neck:      Musculoskeletal: Normal range of motion and neck supple.   Cardiovascular:      Rate and Rhythm: Normal rate and regular rhythm.   Pulmonary:      Effort: Pulmonary effort is normal.      Breath sounds: Rhonchi present.   Abdominal:      General: Abdomen is flat. There is no distension.      Tenderness: There is abdominal tenderness in the periumbilical area. There is no guarding or rebound.   Musculoskeletal: Normal range of motion.         General: No swelling or deformity.   Skin:     General: Skin is warm and dry.   Neurological:      General: No focal deficit present.      Mental Status: She is alert and oriented to person, place, and time.   Psychiatric:         Mood and Affect: Mood normal.         Behavior: Behavior normal.         Laboratory:  Recent Labs     07/14/20 2230   WBC 1.8*   RBC 4.46   HEMOGLOBIN 12.7   HEMATOCRIT 37.7   MCV 84.5   MCH 28.5   MCHC 33.7   RDW 42.4   PLATELETCT 256   MPV 9.8     Recent Labs     07/14/20 2230   SODIUM 137   POTASSIUM 4.1   CHLORIDE 99   CO2 25   GLUCOSE 139*   BUN 9   CREATININE 0.37*   CALCIUM 9.1     Recent Labs     07/14/20 2230   ALTSGPT 62*   ASTSGOT 85*   ALKPHOSPHAT 83   TBILIRUBIN 0.4   GLUCOSE 139*     Recent Labs     07/14/20 2230   APTT 30.5   INR 0.92     No results for input(s): NTPROBNP in the last 72 hours.      No results  for input(s): TROPONINT in the last 72 hours.    Imaging:  DX-CHEST-PORTABLE (1 VIEW)   Final Result         1.  No acute cardiopulmonary disease.            Assessment/Plan:      Neutropenic fever (HCC)  Assessment & Plan  Started on cefepime per protocol  Follow with blood culture, urine and urine culture.  Strep PCR ordered  COVID-19 ordered  Protective, contact, and droplet isolation, eye protection  Repeat CBC    Malignant neoplasm of upper-outer quadrant of right breast in female, estrogen receptor positive (HCC)- (present on admission)  Assessment & Plan  Follow with oncology as outpatient

## 2020-07-15 NOTE — DIETARY
NUTRITION SERVICES: BMI - Pt with BMI >40 (=Body mass index is 45.29 kg/m².), morbid obesity. Weight loss counseling not appropriate in acute care setting. RECOMMEND - Referral to outpatient nutrition services for weight management after D/C.

## 2020-07-15 NOTE — PROGRESS NOTES
Name:  Bigg   ID Number:  [id number]    Content Discussed:  Pt admitted to room S153 from G24 at 0250.  Pt  a&o x4. Complaints of generalized pain reported at 2. Ambulating with one person [place] ASHER.  On RA. Respirations equal and unlabored. +cough, productive. Oriented to room call light and vitals frequency.   Reviewed plan of care: IV antibiotics, isolation pending covid result,  diet, fall precautions, skin care, labs,and pain management with patient. Admit profile done. Passed RN bedside swallow evaluation without s/sx of aspiration. All questions answered. White board updated. Verbalized understanding and agrees. Able to make needs known.

## 2020-07-15 NOTE — ASSESSMENT & PLAN NOTE
Patient remains on cefepime day #6.  ANC yesterday was 480  Repeat CBC from this morning is pending  Continue with Granix

## 2020-07-15 NOTE — ED TRIAGE NOTES
Chief Complaint   Patient presents with   • Headache   • Sore Throat   • Cough     Pt states above symptoms x 3 days.  Pt states last chemo tx was 5 days ago.  Pt breast cancer pt.

## 2020-07-15 NOTE — ED PROVIDER NOTES
ED Provider Note    CHIEF COMPLAINT  Chief Complaint   Patient presents with   • Headache   • Sore Throat   • Cough        [facility]    Primary care provider: AKBAR Henry   History obtained from: Patient  History limited by: None     Zahida Rodriguez is a 49 y.o. female who presents to the ED complaining of fever up to 110 at home along with headache, sore throat and cough for the past 3 days.  She reports slight shortness of breath and difficulty breathing.  Patient with history of breast cancer status post mastectomy and currently on chemotherapy with her last treatment 5 days ago.  She has had some nausea and vomiting and diarrhea due to chemo.  She denies dysuria.  She denies possibility of pregnancy.  She denies any chest pain or abdominal pain.  She has not noticed any rash or swelling.  She denies any known ill contacts or recent travels.    REVIEW OF SYSTEMS  Please see HPI for pertinent positives/negatives.  All other systems reviewed and are negative.     PAST MEDICAL HISTORY  Past Medical History:   Diagnosis Date   • Cancer (HCC) 2013    breast cancer/ chemo and radiation   • Arthritis     hands ,    • Heart burn    • Malignant neoplasm of upper-outer quadrant of right female breast (HCC)    • Snoring    • Urinary incontinence         SURGICAL HISTORY  Past Surgical History:   Procedure Laterality Date   • PB MASTECTOMY, SIMPLE, COMPLETE Left 4/10/2020    Procedure: MASTECTOMY-SIMPLE;  Surgeon: Codie Carreno M.D.;  Location: [facility];  Service: General   • PB MASTECTOMY, MODIFIED RADICAL Right 4/10/2020    Procedure: MASTECTOMY, MODIFIED RADICAL;  Surgeon: Codie Carreno M.D.;  Location: [facility];  Service: General   • AXILLARY NODE DISSECTION Right 4/10/2020    Procedure: LYMPHADENECTOMY, AXILLARY-RIGHT;  Surgeon: Codie Carreno M.D.;  Location: [facility];  Service: General   • CATH PLACEMENT Left 4/10/2020    Procedure: INSERTION,  CATHETER FOR PORT PLACEMENT;  Surgeon: Codie Carreno M.D.;  Location: [facility];  Service: General   • OTHER  2013    lumpectomy   • GYN SURGERY  1989,1991    c sec x 2   • OTHER ORTHOPEDIC SURGERY      lumbar discectomy        SOCIAL HISTORY  Social History     Tobacco Use   • Smoking status: Never Smoker   • Smokeless tobacco: Never Used   Substance and Sexual Activity   • Alcohol use: Not Currently     Comment: rare social   • Drug use: Never   • Sexual activity: Not on file        FAMILY HISTORY  Family History   Problem Relation Age of Onset   • Cancer Father         prostate   • Cancer Other         CURRENT MEDICATIONS  Home Medications     Reviewed by Chauncey Salmeron R.N. (Registered Nurse) on 07/14/20 at 2046  Med List Status: Not Addressed   Medication Last Dose Status   atorvastatin (LIPITOR) 10 MG Tab  Active   dexamethasone (DECADRON) 4 MG Tab  Active   diphenhydrAMINE HCl (ALLERGY MED PO)  Active   lidocaine-prilocaine (EMLA) 2.5-2.5 % Cream  Active   metFORMIN (GLUCOPHAGE) 500 MG Tab  Active   omeprazole (PRILOSEC) 40 MG delayed-release capsule  Active   ondansetron (ZOFRAN ODT) 8 MG TABLET DISPERSIBLE  Active   prochlorperazine (COMPAZINE) 10 MG Tab  Active   tamoxifen (NOLVADEX) 10 MG Tab  Active                 ALLERGIES  No Known Allergies     PHYSICAL EXAM  VITAL SIGNS: /58   Pulse 94   Temp 37.5 °C (99.5 °F) (Oral)   Resp 16   Ht 1.524 m (5')   Wt 104.3 kg (230 lb)   SpO2 94%   BMI 44.92 kg/m²  @JANES[[id number]::@     Pulse ox interpretation: 96% I interpret this pulse ox as normal     Constitutional: Well developed, well nourished, alert in no apparent distress, nontoxic appearance    HENT: No external signs of trauma, normocephalic  Eyes: PERRL, conjunctiva without erythema, no discharge, no icterus    Neck: Soft and supple, trachea midline, no stridor, no tenderness, no LAD, no JVD, good ROM    Cardiovascular: Tachycardia, no murmurs/rubs/gallops, strong distal  pulses and good perfusion    Thorax & Lungs: No respiratory distress, CTAB   Abdomen: Soft, nontender, nondistended, no guarding, no rebound, normal BS    Back: No CVAT    Extremities: No cyanosis, no edema, no gross deformity, good ROM, no tenderness, intact distal pulses with brisk cap refill    Skin: Warm, dry, no pallor/cyanosis, no rash noted    Lymphatic: No lymphadenopathy noted    Neuro: A/O times 3, no focal deficits noted    Psychiatric: Cooperative, normal mood and affect, normal judgement      DIAGNOSTIC STUDIES / PROCEDURES        LABS  All labs reviewed by me.     Results for orders placed or performed during the hospital encounter of 07/14/20   Lactic acid (lactate)   Result Value Ref Range    Lactic Acid 2.0 0.5 - 2.0 mmol/L   CBC with Differential   Result Value Ref Range    WBC 1.8 (LL) 4.8 - 10.8 K/uL    RBC 4.46 4.20 - 5.40 M/uL    Hemoglobin 12.7 12.0 - 16.0 g/dL    Hematocrit 37.7 37.0 - 47.0 %    MCV 84.5 81.4 - 97.8 fL    MCH 28.5 27.0 - 33.0 pg    MCHC 33.7 33.6 - 35.0 g/dL    RDW 42.4 35.9 - 50.0 fL    Platelet Count 256 164 - 446 K/uL    MPV 9.8 9.0 - 12.9 fL    Neutrophils-Polys 63.60 44.00 - 72.00 %    Lymphocytes 32.40 22.00 - 41.00 %    Monocytes 1.70 0.00 - 13.40 %    Eosinophils 0.60 0.00 - 6.90 %    Basophils 1.10 0.00 - 1.80 %    Immature Granulocytes 0.60 0.00 - 0.90 %    Nucleated RBC 0.00 /100 WBC    Neutrophils (Absolute) 1.14 (L) 2.00 - 7.15 K/uL    Lymphs (Absolute) 0.58 (L) 1.00 - 4.80 K/uL    Monos (Absolute) 0.03 0.00 - 0.85 K/uL    Eos (Absolute) 0.01 0.00 - 0.51 K/uL    Baso (Absolute) 0.02 0.00 - 0.12 K/uL    Immature Granulocytes (abs) 0.01 0.00 - 0.11 K/uL    NRBC (Absolute) 0.00 K/uL   Comp Metabolic Panel (CMP)   Result Value Ref Range    Sodium 137 135 - 145 mmol/L    Potassium 4.1 3.6 - 5.5 mmol/L    Chloride 99 96 - 112 mmol/L    Co2 25 20 - 33 mmol/L    Anion Gap 13.0 7.0 - 16.0    Glucose 139 (H) 65 - 99 mg/dL    Bun 9 8 - 22 mg/dL    Creatinine 0.37 (L) 0.50 -  1.40 mg/dL    Calcium 9.1 8.5 - 10.5 mg/dL    AST(SGOT) 85 (H) 12 - 45 U/L    ALT(SGPT) 62 (H) 2 - 50 U/L    Alkaline Phosphatase 83 30 - 99 U/L    Total Bilirubin 0.4 0.1 - 1.5 mg/dL    Albumin 3.9 3.2 - 4.9 g/dL    Total Protein 7.0 6.0 - 8.2 g/dL    Globulin 3.1 1.9 - 3.5 g/dL    A-G Ratio 1.3 g/dL   Prothrombin Time (INR)   Result Value Ref Range    PT 12.6 12.0 - 14.6 sec    INR 0.92 0.87 - 1.13   APTT (PTT)   Result Value Ref Range    APTT 30.5 24.7 - 36.0 sec   Urinalysis    Specimen: Urine, Clean Catch   Result Value Ref Range    Color Yellow     Character Clear     Specific Gravity 1.017 <1.035    Ph 8.0 5.0 - 8.0    Glucose Negative Negative mg/dL    Ketones Negative Negative mg/dL    Protein 30 (A) Negative mg/dL    Bilirubin Negative Negative    Urobilinogen, Urine 0.2 Negative    Nitrite Negative Negative    Leukocyte Esterase Negative Negative    Occult Blood Negative Negative    Micro Urine Req Microscopic    PROCALCITONIN   Result Value Ref Range    Procalcitonin 0.05 <0.25 ng/mL   COVID/SARS CoV-2 PCR    Specimen: Nasopharyngeal; Respirate   Result Value Ref Range    COVID Order Status Received    ESTIMATED GFR   Result Value Ref Range    GFR If African American >60 >60 mL/min/1.73 m 2    GFR If Non African American >60 >60 mL/min/1.73 m 2   SARS-CoV-2, PCR (In-House)   Result Value Ref Range    SARS-CoV-2 Source NP Swab    URINE MICROSCOPIC (W/UA)   Result Value Ref Range    WBC 0-2 /hpf    RBC 2-5 (A) /hpf    Bacteria Negative None /hpf    Epithelial Cells Negative /hpf    Hyaline Cast 0-2 /lpf        RADIOLOGY  The radiologist's interpretation of all radiological studies have been reviewed by me.     DX-CHEST-PORTABLE (1 VIEW)   Final Result         1.  No acute cardiopulmonary disease.             COURSE & MEDICAL DECISION MAKING  Nursing notes, VS, PMSFHx reviewed in chart.     Review of past medical records shows the patient without prior visits to this ED.      Differential diagnoses  considered include but are not limited to: AMI, pericardial effusion/tamponade, pericarditis, PE, pneumonia, pleural effusion, bronchitis, respiratory infection, sepsis, metabolic acidosis      0128: D/W Dr. Johnson, oncologist.  Patient can be admitted by medicine service.    0138: D/W Dr. Davis, hospitalist, who will admit patient      History and physical exam as above.  This is a patient with history of breast cancer currently undergoing chemotherapy who presented with fever and tachycardia.  Sepsis protocol was initiated including IV fluid.  Chest x-ray without evidence for acute abnormality.  UA without overt signs of infection.  Initial lactic acid and procalcitonin were both unremarkable.  However, patient is neutropenic.  Patient with mildly elevated transaminases.  COVID-19 results is still pending at this time.  After IV fluid and acetaminophen, patient with improvement of her tachycardia and fever.  No clear etiology at this time for her fever.  She was empirically given a dose of cefepime for her neutropenic fever.  Findings discussed with Dr. Johnson with above recommendations.  Discussed with Dr. Davis who graciously agreed to admit patient for further care.  Findings and plan discussed with patient and she is agreeable.       HYDRATION: Based on the patient's presentation of Tachycardia the patient was given IV fluids. IV Hydration was used because oral hydration was not as rapid as required. Upon recheck following hydration, the patient was improving.    CRITICAL CARE NOTE:  Total critical care time spent on this patient was 30 minutes exclusive of separately billable procedures.  This may include direct bedside patient care, speaking with family members, review of past medical records, reviewing the results of laboratory/diagnostic studies, consulting with other physicians, as well as evaluating the response to the therapy instituted.        FINAL IMPRESSION  1. Fever, unspecified fever cause  Acute   2. Neutropenia, unspecified type (HCC) Acute   3. History of breast cancer Active   4. Elevated transaminase level Active          DISPOSITION  Patient will be admitted by hospitalist for further care      Electronically signed by: Irineo Langford D.O., 7/15/2020 12:26 AM      Portions of this record were made with voice recognition software.  Despite my review, spelling/grammar/context errors may still remain.  Interpretation of this chart should be taken in this context.

## 2020-07-15 NOTE — PROGRESS NOTES
Patient is undergoing breast cancer treatment with chemoradiation and at this point has developed neutropenia.  The patient also is having fevers and chills.  Because of this the patient has come to the COVID unit and the result of this at this point is pending.  In the meantime her absolute neutrophil count is 1100 from yesterday will need to be repeated.  Continue at this point with antibiotic management.

## 2020-07-15 NOTE — PROGRESS NOTES
Received bedside report from night shift RN. Assumed care of patient. Pt assessed and stable. VSS. Patient reports 0/10 pain at this time.    Discussed plan of care for day with patient and received verbal understanding. Call light within reach, bed in low position.  Educated pt re fall precautions and received verbal understanding.  However, pt continues to refuse bed alarms.  Pt is alert, oriented, steady on feet and calls appropriately.

## 2020-07-15 NOTE — ED NOTES
PT REQUESTING PORT TO BE ACCESSED. ACCESS SUCCESSFUL HOWEVER INITIALLY UNABLE TO DRAW BACK FOR LABS. PT ON MONITORING, FEBRILE AND TACHY HR NOTED. CALL LIGHT WITHGIN REACH. WILL CONTINUE TO MONITOR

## 2020-07-15 NOTE — CARE PLAN
Problem: Communication  Goal: The ability to communicate needs accurately and effectively will improve  Outcome: PROGRESSING AS EXPECTED  Note: Patient primarily Sami speaker. Ipad  utilized. Plan of care reviewed. Verbalized understanding and agrees.      Problem: Bowel/Gastric:  Goal: Normal bowel function is maintained or improved  Outcome: PROGRESSING AS EXPECTED  Note: Reports slight nausea earlier today. No vomiting. Denies antiemetics right now.

## 2020-07-15 NOTE — ED NOTES
Anna from Lab called with critical result of WBC at 1.8. Critical lab result read back to Anna.   Dr. Langford notified of critical lab result at 1.8.  Critical lab result read back by Dr. Langford.

## 2020-07-15 NOTE — PROGRESS NOTES
"When asked about depression screening, pt answered \"no\". However, she mentioned that she had thought of hurting herself in the past when she was newly diagnosed of cancer. She had thought of drinking a lot of her pills. When asked if she wanted to talk to  for resources, she welcomed the idea and agreed. I told her that I would have to report this to the doctor as well. She then retract her statement and said she does not have any intentions of hurting herself at this moment. She has the support of her . I will still place an order for  for information and resource.   "

## 2020-07-15 NOTE — CARE PLAN
Problem: Safety  Goal: Will remain free from injury  Outcome: PROGRESSING AS EXPECTED  Note: Patient is steady on feet and independent with ADLs.  Treaded socks in place. Call light and personal belongings within reach.     Problem: Infection  Goal: Will remain free from infection  Outcome: PROGRESSING AS EXPECTED  Note: Patient admitted with possible Covid 19 infection.  Positive for headache, shortness of breath and cough for 3 days.  Covid 19 test pending at this time.  Patient on room air.       Problem: Venous Thromboembolism (VTW)/Deep Vein Thrombosis (DVT) Prevention:  Goal: Patient will participate in Venous Thrombosis (VTE)/Deep Vein Thrombosis (DVT)Prevention Measures  Outcome: PROGRESSING AS EXPECTED  Note: Patient receiving Lovenox subq.

## 2020-07-16 PROBLEM — J12.82 PNEUMONIA DUE TO COVID-19 VIRUS: Status: ACTIVE | Noted: 2020-07-16

## 2020-07-16 PROBLEM — U07.1 PNEUMONIA DUE TO COVID-19 VIRUS: Status: ACTIVE | Noted: 2020-07-16

## 2020-07-16 LAB
ALBUMIN SERPL BCP-MCNC: 3.8 G/DL (ref 3.2–4.9)
ALBUMIN/GLOB SERPL: 1.4 G/DL
ALP SERPL-CCNC: 76 U/L (ref 30–99)
ALT SERPL-CCNC: 79 U/L (ref 2–50)
ANION GAP SERPL CALC-SCNC: 12 MMOL/L (ref 7–16)
ANISOCYTOSIS BLD QL SMEAR: ABNORMAL
AST SERPL-CCNC: 99 U/L (ref 12–45)
BASOPHILS # BLD AUTO: 0 % (ref 0–1.8)
BASOPHILS # BLD: 0 K/UL (ref 0–0.12)
BILIRUB SERPL-MCNC: 0.4 MG/DL (ref 0.1–1.5)
BUN SERPL-MCNC: 11 MG/DL (ref 8–22)
CALCIUM SERPL-MCNC: 8.6 MG/DL (ref 8.5–10.5)
CHLORIDE SERPL-SCNC: 95 MMOL/L (ref 96–112)
CO2 SERPL-SCNC: 24 MMOL/L (ref 20–33)
CREAT SERPL-MCNC: 0.42 MG/DL (ref 0.5–1.4)
EOSINOPHIL # BLD AUTO: 0 K/UL (ref 0–0.51)
EOSINOPHIL NFR BLD: 0 % (ref 0–6.9)
ERYTHROCYTE [DISTWIDTH] IN BLOOD BY AUTOMATED COUNT: 42.3 FL (ref 35.9–50)
GLOBULIN SER CALC-MCNC: 2.8 G/DL (ref 1.9–3.5)
GLUCOSE SERPL-MCNC: 159 MG/DL (ref 65–99)
HCT VFR BLD AUTO: 35.8 % (ref 37–47)
HGB BLD-MCNC: 11.9 G/DL (ref 12–16)
LYMPHOCYTES # BLD AUTO: 1.02 K/UL (ref 1–4.8)
LYMPHOCYTES NFR BLD: 85.2 % (ref 22–41)
MACROCYTES BLD QL SMEAR: ABNORMAL
MANUAL DIFF BLD: NORMAL
MCH RBC QN AUTO: 28 PG (ref 27–33)
MCHC RBC AUTO-ENTMCNC: 33.2 G/DL (ref 33.6–35)
MCV RBC AUTO: 84.2 FL (ref 81.4–97.8)
METAMYELOCYTES NFR BLD MANUAL: 0.9 %
MICROCYTES BLD QL SMEAR: ABNORMAL
MONOCYTES # BLD AUTO: 0.08 K/UL (ref 0–0.85)
MONOCYTES NFR BLD AUTO: 7 % (ref 0–13.4)
MORPHOLOGY BLD-IMP: NORMAL
NEUTROPHILS # BLD AUTO: 0.08 K/UL (ref 2–7.15)
NEUTROPHILS NFR BLD: 7 % (ref 44–72)
NRBC # BLD AUTO: 0 K/UL
NRBC BLD-RTO: 0 /100 WBC
OVALOCYTES BLD QL SMEAR: NORMAL
PLATELET # BLD AUTO: 242 K/UL (ref 164–446)
PLATELET BLD QL SMEAR: NORMAL
PMV BLD AUTO: 9.6 FL (ref 9–12.9)
POIKILOCYTOSIS BLD QL SMEAR: NORMAL
POLYCHROMASIA BLD QL SMEAR: NORMAL
POTASSIUM SERPL-SCNC: 4 MMOL/L (ref 3.6–5.5)
PROT SERPL-MCNC: 6.6 G/DL (ref 6–8.2)
RBC # BLD AUTO: 4.25 M/UL (ref 4.2–5.4)
RBC BLD AUTO: PRESENT
SODIUM SERPL-SCNC: 131 MMOL/L (ref 135–145)
WBC # BLD AUTO: 1.2 K/UL (ref 4.8–10.8)

## 2020-07-16 PROCEDURE — 80053 COMPREHEN METABOLIC PANEL: CPT

## 2020-07-16 PROCEDURE — 700105 HCHG RX REV CODE 258: Performed by: INTERNAL MEDICINE

## 2020-07-16 PROCEDURE — 700111 HCHG RX REV CODE 636 W/ 250 OVERRIDE (IP): Performed by: HOSPITALIST

## 2020-07-16 PROCEDURE — 700102 HCHG RX REV CODE 250 W/ 637 OVERRIDE(OP): Performed by: INTERNAL MEDICINE

## 2020-07-16 PROCEDURE — 99232 SBSQ HOSP IP/OBS MODERATE 35: CPT | Performed by: HOSPITALIST

## 2020-07-16 PROCEDURE — 700102 HCHG RX REV CODE 250 W/ 637 OVERRIDE(OP): Performed by: HOSPITALIST

## 2020-07-16 PROCEDURE — 770021 HCHG ROOM/CARE - ISO PRIVATE

## 2020-07-16 PROCEDURE — 700111 HCHG RX REV CODE 636 W/ 250 OVERRIDE (IP): Performed by: INTERNAL MEDICINE

## 2020-07-16 PROCEDURE — A9270 NON-COVERED ITEM OR SERVICE: HCPCS | Performed by: HOSPITALIST

## 2020-07-16 PROCEDURE — A9270 NON-COVERED ITEM OR SERVICE: HCPCS | Performed by: INTERNAL MEDICINE

## 2020-07-16 PROCEDURE — 85027 COMPLETE CBC AUTOMATED: CPT

## 2020-07-16 PROCEDURE — 85007 BL SMEAR W/DIFF WBC COUNT: CPT

## 2020-07-16 RX ORDER — ERGOCALCIFEROL 1.25 MG/1
50000 CAPSULE ORAL
Status: DISCONTINUED | OUTPATIENT
Start: 2020-07-16 | End: 2020-07-20 | Stop reason: HOSPADM

## 2020-07-16 RX ORDER — ZINC SULFATE 50(220)MG
220 CAPSULE ORAL DAILY
Status: DISCONTINUED | OUTPATIENT
Start: 2020-07-16 | End: 2020-07-20 | Stop reason: HOSPADM

## 2020-07-16 RX ORDER — ASCORBIC ACID 500 MG
1000 TABLET ORAL 2 TIMES DAILY
Status: DISCONTINUED | OUTPATIENT
Start: 2020-07-16 | End: 2020-07-20 | Stop reason: HOSPADM

## 2020-07-16 RX ORDER — DEXAMETHASONE 6 MG/1
6 TABLET ORAL DAILY
Status: DISCONTINUED | OUTPATIENT
Start: 2020-07-16 | End: 2020-07-20 | Stop reason: HOSPADM

## 2020-07-16 RX ADMIN — ENOXAPARIN SODIUM 100 MG: 100 INJECTION SUBCUTANEOUS at 16:36

## 2020-07-16 RX ADMIN — ONDANSETRON 4 MG: 2 INJECTION INTRAMUSCULAR; INTRAVENOUS at 13:10

## 2020-07-16 RX ADMIN — OXYCODONE HYDROCHLORIDE 10 MG: 10 TABLET ORAL at 13:10

## 2020-07-16 RX ADMIN — OXYCODONE HYDROCHLORIDE 10 MG: 10 TABLET ORAL at 16:36

## 2020-07-16 RX ADMIN — OXYCODONE HYDROCHLORIDE 10 MG: 10 TABLET ORAL at 04:57

## 2020-07-16 RX ADMIN — DOCUSATE SODIUM 50 MG AND SENNOSIDES 8.6 MG 2 TABLET: 8.6; 5 TABLET, FILM COATED ORAL at 04:57

## 2020-07-16 RX ADMIN — Medication 1000 MG: at 18:00

## 2020-07-16 RX ADMIN — CEFEPIME 2 G: 2 INJECTION, POWDER, FOR SOLUTION INTRAVENOUS at 13:09

## 2020-07-16 RX ADMIN — DEXAMETHASONE 6 MG: 6 TABLET ORAL at 13:09

## 2020-07-16 RX ADMIN — OXYCODONE HYDROCHLORIDE 10 MG: 10 TABLET ORAL at 09:34

## 2020-07-16 RX ADMIN — ONDANSETRON 4 MG: 4 TABLET, ORALLY DISINTEGRATING ORAL at 09:34

## 2020-07-16 RX ADMIN — ATORVASTATIN CALCIUM 10 MG: 10 TABLET, FILM COATED ORAL at 21:51

## 2020-07-16 RX ADMIN — ONDANSETRON 4 MG: 4 TABLET, ORALLY DISINTEGRATING ORAL at 16:36

## 2020-07-16 RX ADMIN — ENOXAPARIN SODIUM 40 MG: 40 INJECTION SUBCUTANEOUS at 04:57

## 2020-07-16 RX ADMIN — CEFEPIME 2 G: 2 INJECTION, POWDER, FOR SOLUTION INTRAVENOUS at 04:57

## 2020-07-16 RX ADMIN — ERGOCALCIFEROL 50000 UNITS: 1.25 CAPSULE ORAL at 13:10

## 2020-07-16 RX ADMIN — ZINC SULFATE 220 MG (50 MG) CAPSULE 220 MG: CAPSULE at 13:10

## 2020-07-16 RX ADMIN — DOCUSATE SODIUM 50 MG AND SENNOSIDES 8.6 MG 2 TABLET: 8.6; 5 TABLET, FILM COATED ORAL at 16:36

## 2020-07-16 RX ADMIN — Medication 1000 MG: at 13:09

## 2020-07-16 RX ADMIN — TAMOXIFEN CITRATE 10 MG: 10 TABLET, FILM COATED ORAL at 16:36

## 2020-07-16 RX ADMIN — CEFEPIME 2 G: 2 INJECTION, POWDER, FOR SOLUTION INTRAVENOUS at 21:50

## 2020-07-16 RX ADMIN — OMEPRAZOLE 40 MG: 20 CAPSULE, DELAYED RELEASE ORAL at 04:57

## 2020-07-16 ASSESSMENT — ENCOUNTER SYMPTOMS
WHEEZING: 0
HEADACHES: 0
BRUISES/BLEEDS EASILY: 0
EYES NEGATIVE: 1
ABDOMINAL PAIN: 1
CONSTIPATION: 0
FOCAL WEAKNESS: 0
VOMITING: 0
DIARRHEA: 1
NERVOUS/ANXIOUS: 0
HEMOPTYSIS: 0
DOUBLE VISION: 0
DIAPHORESIS: 0
NEUROLOGICAL NEGATIVE: 1
SEIZURES: 0
NAUSEA: 0
DEPRESSION: 0
SHORTNESS OF BREATH: 1
DIZZINESS: 0
LOSS OF CONSCIOUSNESS: 0
BLOOD IN STOOL: 0
PALPITATIONS: 0
COUGH: 1
FEVER: 0
CHILLS: 1
PSYCHIATRIC NEGATIVE: 1
HEARTBURN: 0
BACK PAIN: 1

## 2020-07-16 ASSESSMENT — LIFESTYLE VARIABLES
EVER_SMOKED: NEVER
SUBSTANCE_ABUSE: 0

## 2020-07-16 NOTE — PROGRESS NOTES
[facility] Medicine Daily Progress Note    Date of Service  7/16/2020    Chief Complaint  49 y.o. female admitted 7/14/2020 with headache and sore throat.  The patient was also complaining of shortness of breath and coughing    Hospital Course    Patient is a 49-year-old female comes in with generalized symptoms of shortness of breath cough fevers chills headache.  The patient is immunocompromised due to treatment for breast cancer.  The patient now is found to be COVID-19 positive.  At this point her cancer treatment will need to be put on hold.  The patient will need to be given supportive treatment.  We have started her on dexamethasone then vitamin C and vitamin D.      Interval Problem Update  Initiate dexamethasone 6 mg daily for 10 days  Initiate zinc, vitamin C, vitamin D for 10 days  Tylenol for fever and pain  Optimize at this point fluid status and do not over hydrate as this can make COVID-19 worse  Supportive care  Monitor cultures    Consultants/Specialty  None    Code Status  Full code    Disposition  To be determined    Review of Systems  Review of Systems   Constitutional: Positive for chills. Negative for diaphoresis and fever.   HENT: Negative.    Eyes: Negative.  Negative for double vision.   Respiratory: Positive for cough and shortness of breath. Negative for hemoptysis and wheezing.    Cardiovascular: Positive for chest pain. Negative for palpitations and leg swelling.   Gastrointestinal: Positive for abdominal pain and diarrhea. Negative for blood in stool, constipation, heartburn, nausea and vomiting.   Genitourinary: Positive for dysuria, frequency and urgency. Negative for hematuria.   Musculoskeletal: Positive for back pain. Negative for joint pain.   Skin: Negative.  Negative for itching and rash.   Neurological: Negative.  Negative for dizziness, focal weakness, seizures, loss of consciousness and headaches.   Endo/Heme/Allergies: Negative.  Does not bruise/bleed easily.    Psychiatric/Behavioral: Negative.  Negative for depression, substance abuse and suicidal ideas. The patient is not nervous/anxious.    All other systems reviewed and are negative.       Physical Exam  Temp:  [36.3 °C (97.4 °F)-37.5 °C (99.5 °F)] 36.3 °C (97.4 °F)  Pulse:  [] 87  Resp:  [16-20] 16  BP: (103-143)/(56-81) 103/56  SpO2:  [93 %-98 %] 98 %    Physical Exam  Vitals signs and nursing note reviewed. Exam conducted with a chaperone present.   Constitutional:       Appearance: Normal appearance. She is well-developed and normal weight.   HENT:      Head: Normocephalic and atraumatic.      Right Ear: External ear normal.      Left Ear: External ear normal.      Nose: Nose normal.      Mouth/Throat:      Mouth: Mucous membranes are dry.      Pharynx: Oropharynx is clear.   Eyes:      Extraocular Movements: Extraocular movements intact.      Conjunctiva/sclera: Conjunctivae normal.      Pupils: Pupils are equal, round, and reactive to light.   Neck:      Musculoskeletal: Normal range of motion and neck supple.      Thyroid: No thyromegaly.      Vascular: No JVD.   Cardiovascular:      Rate and Rhythm: Normal rate and regular rhythm.      Heart sounds: No murmur.   Pulmonary:      Effort: Accessory muscle usage present.      Breath sounds: Decreased air movement present. Examination of the right-middle field reveals decreased breath sounds. Examination of the left-middle field reveals decreased breath sounds. Examination of the right-lower field reveals decreased breath sounds. Examination of the left-lower field reveals decreased breath sounds. Decreased breath sounds present.   Chest:      Chest wall: No tenderness.   Abdominal:      General: There is no distension.      Palpations: There is no mass.      Tenderness: There is abdominal tenderness in the right lower quadrant and left lower quadrant. There is no guarding or rebound.   Musculoskeletal: Normal range of motion.      Right lower leg: Edema  present.      Left lower leg: Edema present.   Lymphadenopathy:      Cervical: No cervical adenopathy.   Skin:     General: Skin is warm and dry.      Capillary Refill: Capillary refill takes 2 to 3 seconds.      Coloration: Skin is not pale.      Findings: No erythema or rash.   Neurological:      General: No focal deficit present.      Mental Status: She is alert and oriented to person, place, and time. Mental status is at baseline.      GCS: GCS eye subscore is 4. GCS verbal subscore is 5. GCS motor subscore is 6.      Cranial Nerves: No cranial nerve deficit.      Deep Tendon Reflexes: Reflexes are normal and symmetric.   Psychiatric:         Mood and Affect: Mood normal.         Behavior: Behavior normal.         Thought Content: Thought content normal.         Judgment: Judgment normal.         Fluids    Intake/Output Summary (Last 24 hours) at 7/16/2020 1112  Last data filed at 7/15/2020 2000  Gross per 24 hour   Intake 320 ml   Output --   Net 320 ml       Laboratory  Recent Labs     07/14/20 2230 07/16/20  0500   WBC 1.8* 1.2*   RBC 4.46 4.25   HEMOGLOBIN 12.7 11.9*   HEMATOCRIT 37.7 35.8*   MCV 84.5 84.2   MCH 28.5 28.0   MCHC 33.7 33.2*   RDW 42.4 42.3   PLATELETCT 256 242   MPV 9.8 9.6     Recent Labs     07/14/20 2230 07/16/20  0500   SODIUM 137 131*   POTASSIUM 4.1 4.0   CHLORIDE 99 95*   CO2 25 24   GLUCOSE 139* 159*   BUN 9 11   CREATININE 0.37* 0.42*   CALCIUM 9.1 8.6     Recent Labs     07/14/20  2230 07/15/20  0530   APTT 30.5  --    INR 0.92 1.01               Imaging  DX-CHEST-PORTABLE (1 VIEW)   Final Result         1.  No acute cardiopulmonary disease.           Assessment/Plan  Pneumonia due to COVID-19 virus  Assessment & Plan  Continue with supportive care.  Continue at this point with a combination of zinc, vitamin D, vitamin C, dexamethasone.    Neutropenic fever (HCC)  Assessment & Plan  Patient remains on cefepime day #2.  So far blood cultures are negative.  Continue at this point  with monitoring fevers, vitals.  Patient is COVID-19 positive.      Malignant neoplasm of upper-outer quadrant of right breast in female, estrogen receptor positive (HCC)- (present on admission)  Assessment & Plan  Patient is undergoing radiation and chemotherapy for her breast cancer.  Optimize pain management  Optimize fluid status.         VTE prophylaxis: lovenox

## 2020-07-17 PROBLEM — R74.8 ELEVATED LIVER ENZYMES: Status: ACTIVE | Noted: 2020-07-17

## 2020-07-17 PROBLEM — D64.9 NORMOCHROMIC NORMOCYTIC ANEMIA: Status: ACTIVE | Noted: 2020-07-17

## 2020-07-17 PROBLEM — R73.9 HYPERGLYCEMIA: Status: ACTIVE | Noted: 2020-07-17

## 2020-07-17 LAB
BACTERIA UR CULT: NORMAL
BASOPHILS # BLD AUTO: 0 % (ref 0–1.8)
BASOPHILS # BLD: 0 K/UL (ref 0–0.12)
D DIMER PPP IA.FEU-MCNC: 0.41 UG/ML (FEU) (ref 0–0.5)
EOSINOPHIL # BLD AUTO: 0 K/UL (ref 0–0.51)
EOSINOPHIL NFR BLD: 0 % (ref 0–6.9)
ERYTHROCYTE [DISTWIDTH] IN BLOOD BY AUTOMATED COUNT: 41.6 FL (ref 35.9–50)
FERRITIN SERPL-MCNC: 455 NG/ML (ref 10–291)
FOLATE SERPL-MCNC: 17.8 NG/ML
HCT VFR BLD AUTO: 34.7 % (ref 37–47)
HGB BLD-MCNC: 11.5 G/DL (ref 12–16)
IRON SATN MFR SERPL: 10 % (ref 15–55)
IRON SERPL-MCNC: 29 UG/DL (ref 40–170)
LYMPHOCYTES # BLD AUTO: 0.42 K/UL (ref 1–4.8)
LYMPHOCYTES NFR BLD: 70 % (ref 22–41)
MANUAL DIFF BLD: NORMAL
MCH RBC QN AUTO: 28.2 PG (ref 27–33)
MCHC RBC AUTO-ENTMCNC: 33.1 G/DL (ref 33.6–35)
MCV RBC AUTO: 85 FL (ref 81.4–97.8)
MONOCYTES # BLD AUTO: 0.09 K/UL (ref 0–0.85)
MONOCYTES NFR BLD AUTO: 15 % (ref 0–13.4)
MORPHOLOGY BLD-IMP: NORMAL
NEUTROPHILS # BLD AUTO: 0.09 K/UL (ref 2–7.15)
NEUTROPHILS NFR BLD: 15 % (ref 44–72)
NRBC # BLD AUTO: 0 K/UL
NRBC BLD-RTO: 0 /100 WBC
PLATELET # BLD AUTO: 247 K/UL (ref 164–446)
PMV BLD AUTO: 9.6 FL (ref 9–12.9)
RBC # BLD AUTO: 4.08 M/UL (ref 4.2–5.4)
SIGNIFICANT IND 70042: NORMAL
SITE SITE: NORMAL
SOURCE SOURCE: NORMAL
TIBC SERPL-MCNC: 277 UG/DL (ref 250–450)
UIBC SERPL-MCNC: 248 UG/DL (ref 110–370)
VIT B12 SERPL-MCNC: 928 PG/ML (ref 211–911)
WBC # BLD AUTO: 0.6 K/UL (ref 4.8–10.8)

## 2020-07-17 PROCEDURE — 82728 ASSAY OF FERRITIN: CPT

## 2020-07-17 PROCEDURE — 85379 FIBRIN DEGRADATION QUANT: CPT

## 2020-07-17 PROCEDURE — 99232 SBSQ HOSP IP/OBS MODERATE 35: CPT | Performed by: HOSPITALIST

## 2020-07-17 PROCEDURE — 85027 COMPLETE CBC AUTOMATED: CPT

## 2020-07-17 PROCEDURE — A9270 NON-COVERED ITEM OR SERVICE: HCPCS | Performed by: INTERNAL MEDICINE

## 2020-07-17 PROCEDURE — 85007 BL SMEAR W/DIFF WBC COUNT: CPT

## 2020-07-17 PROCEDURE — 700105 HCHG RX REV CODE 258: Performed by: INTERNAL MEDICINE

## 2020-07-17 PROCEDURE — 82607 VITAMIN B-12: CPT

## 2020-07-17 PROCEDURE — 700111 HCHG RX REV CODE 636 W/ 250 OVERRIDE (IP): Performed by: INTERNAL MEDICINE

## 2020-07-17 PROCEDURE — A9270 NON-COVERED ITEM OR SERVICE: HCPCS | Performed by: HOSPITALIST

## 2020-07-17 PROCEDURE — 83550 IRON BINDING TEST: CPT

## 2020-07-17 PROCEDURE — 82746 ASSAY OF FOLIC ACID SERUM: CPT

## 2020-07-17 PROCEDURE — 700111 HCHG RX REV CODE 636 W/ 250 OVERRIDE (IP): Performed by: HOSPITALIST

## 2020-07-17 PROCEDURE — 770021 HCHG ROOM/CARE - ISO PRIVATE

## 2020-07-17 PROCEDURE — 700102 HCHG RX REV CODE 250 W/ 637 OVERRIDE(OP): Performed by: INTERNAL MEDICINE

## 2020-07-17 PROCEDURE — 700102 HCHG RX REV CODE 250 W/ 637 OVERRIDE(OP): Performed by: HOSPITALIST

## 2020-07-17 PROCEDURE — 83540 ASSAY OF IRON: CPT

## 2020-07-17 RX ADMIN — TAMOXIFEN CITRATE 10 MG: 10 TABLET, FILM COATED ORAL at 16:58

## 2020-07-17 RX ADMIN — ATORVASTATIN CALCIUM 10 MG: 10 TABLET, FILM COATED ORAL at 21:42

## 2020-07-17 RX ADMIN — CEFEPIME 2 G: 2 INJECTION, POWDER, FOR SOLUTION INTRAVENOUS at 04:26

## 2020-07-17 RX ADMIN — OXYCODONE 5 MG: 5 TABLET ORAL at 21:43

## 2020-07-17 RX ADMIN — CEFEPIME 2 G: 2 INJECTION, POWDER, FOR SOLUTION INTRAVENOUS at 21:43

## 2020-07-17 RX ADMIN — DEXAMETHASONE 6 MG: 6 TABLET ORAL at 04:24

## 2020-07-17 RX ADMIN — OMEPRAZOLE 40 MG: 20 CAPSULE, DELAYED RELEASE ORAL at 04:24

## 2020-07-17 RX ADMIN — Medication 1000 MG: at 16:58

## 2020-07-17 RX ADMIN — ENOXAPARIN SODIUM 100 MG: 100 INJECTION SUBCUTANEOUS at 04:26

## 2020-07-17 RX ADMIN — CEFEPIME 2 G: 2 INJECTION, POWDER, FOR SOLUTION INTRAVENOUS at 14:08

## 2020-07-17 RX ADMIN — Medication 1000 MG: at 04:24

## 2020-07-17 RX ADMIN — DOCUSATE SODIUM 50 MG AND SENNOSIDES 8.6 MG 2 TABLET: 8.6; 5 TABLET, FILM COATED ORAL at 16:58

## 2020-07-17 RX ADMIN — ZINC SULFATE 220 MG (50 MG) CAPSULE 220 MG: CAPSULE at 04:24

## 2020-07-17 ASSESSMENT — ENCOUNTER SYMPTOMS
DIARRHEA: 1
CARDIOVASCULAR NEGATIVE: 1
NAUSEA: 0
SHORTNESS OF BREATH: 1
EYES NEGATIVE: 1
NEUROLOGICAL NEGATIVE: 1
PHOTOPHOBIA: 0
CONSTIPATION: 0
COUGH: 1
DIZZINESS: 0
FEVER: 0
DIAPHORESIS: 0
DEPRESSION: 0
PSYCHIATRIC NEGATIVE: 1
POLYDIPSIA: 0
HEADACHES: 0
WEIGHT LOSS: 0
BACK PAIN: 1
VOMITING: 0
ABDOMINAL PAIN: 1
INSOMNIA: 0
EYE DISCHARGE: 0
EYE REDNESS: 0
WEAKNESS: 0
SEIZURES: 0
SORE THROAT: 0
CLAUDICATION: 0

## 2020-07-17 ASSESSMENT — LIFESTYLE VARIABLES: SUBSTANCE_ABUSE: 0

## 2020-07-17 NOTE — PROGRESS NOTES
Assumed care of patient at 1900. Upon assessment, A/O x4, denies pain at this time. Using iPad , discussed plan of care, pain plan, and call light use. Needs addressed.     Updated  via telephone at this time as well. Requesting an update from MD tomorrow of what the criteria are for getting patient out of hospital.

## 2020-07-17 NOTE — PROGRESS NOTES
Bedside report received.  Assessment complete.  A&O x 4. Patient calls appropriately.  Patient ambulates with no assist. Bed alarm off.   Patient has 0/10 pain. Pain managed with prescribed medications.  Denies N&V. Tolerating regular diet.  + void, + flatus, + BM.  Patient denies SOB.  SCD's off.  Patient is calm and cooperative.  Review plan with of care with patient. Call light and personal belongings with in reach. Hourly rounding in place. All needs met at this time.  /59   Pulse 74   Temp 36.6 °C (97.9 °F) (Temporal)   Resp 18   Ht 1.524 m (5')   Wt 105.2 kg (231 lb 14.8 oz)   SpO2 94%   BMI 45.29 kg/m²

## 2020-07-17 NOTE — PROGRESS NOTES
[facility] Medicine Daily Progress Note    Date of Service  7/17/2020    Chief Complaint  49 y.o. female admitted 7/14/2020 with headache and sore throat.  The patient was also complaining of shortness of breath and coughing    Hospital Course    7/16/2020-Patient is a 49-year-old female comes in with generalized symptoms of shortness of breath cough fevers chills headache.  The patient is immunocompromised due to treatment for breast cancer.  The patient now is found to be COVID-19 positive.  At this point her cancer treatment will need to be put on hold.  The patient will need to be given supportive treatment.  We have started her on dexamethasone then vitamin C and vitamin D.  7/17/2020-today I had to use  services once again to speak with the patient.  At this point the patient tells me that she is feeling a lot better.  The patient is still very neutropenic the patient at this point will need to continue on Maxipime for antibiotic management.  Her absolute neutrophil count is down to 80.  She recently had chemotherapy.  She continues to have at this point tamoxifen for her breast cancer.  Patient's respiratory status is improving diarrhea is improving.      Interval Problem Update  Patient was able to be weaned off oxygen  Patient continues with supportive treatment for COVID-19 with a combination of dexamethasone, zinc, vitamin C, vitamin D  Patient at this point has a ANC of only 80.  Continue reverse isolation  Continue at this point with blood pressure management and fluid management.    Consultants/Specialty  None    Code Status  Full code    Disposition  To be determined    Review of Systems  Review of Systems   Constitutional: Positive for malaise/fatigue. Negative for diaphoresis, fever and weight loss.   HENT: Negative.  Negative for congestion, sore throat and tinnitus.    Eyes: Negative.  Negative for photophobia, discharge and redness.   Respiratory: Positive for cough and shortness of  breath.    Cardiovascular: Negative.  Negative for chest pain, claudication and leg swelling.   Gastrointestinal: Positive for abdominal pain and diarrhea. Negative for constipation, melena, nausea and vomiting.   Genitourinary: Negative.  Negative for dysuria, frequency and hematuria.   Musculoskeletal: Positive for back pain. Negative for joint pain.   Skin: Negative.  Negative for itching and rash.   Neurological: Negative.  Negative for dizziness, seizures, weakness and headaches.   Endo/Heme/Allergies: Negative.  Negative for environmental allergies and polydipsia.   Psychiatric/Behavioral: Negative.  Negative for depression, substance abuse and suicidal ideas. The patient does not have insomnia.    All other systems reviewed and are negative.       Physical Exam  Temp:  [35.9 °C (96.7 °F)-37 °C (98.6 °F)] 36.6 °C (97.9 °F)  Pulse:  [74-91] 74  Resp:  [16-18] 18  BP: (107-121)/(52-63) 107/59  SpO2:  [93 %-95 %] 94 %    Physical Exam  Vitals signs and nursing note reviewed. Exam conducted with a chaperone present.   Constitutional:       Appearance: Normal appearance. She is obese.   HENT:      Head: Normocephalic and atraumatic.      Right Ear: External ear normal.      Left Ear: External ear normal.      Nose: Nose normal.      Mouth/Throat:      Mouth: Mucous membranes are moist.      Pharynx: Oropharynx is clear.   Eyes:      Extraocular Movements: Extraocular movements intact.      Conjunctiva/sclera: Conjunctivae normal.      Pupils: Pupils are equal, round, and reactive to light.   Neck:      Musculoskeletal: Normal range of motion and neck supple. No neck rigidity.      Vascular: No carotid bruit.   Cardiovascular:      Rate and Rhythm: Regular rhythm. Tachycardia present.      Pulses: Normal pulses.      Heart sounds: Normal heart sounds.   Pulmonary:      Effort: Pulmonary effort is normal.      Breath sounds: Normal breath sounds. No wheezing or rhonchi.   Abdominal:      General: Abdomen is flat.  Bowel sounds are normal.      Palpations: Abdomen is soft.   Musculoskeletal: Normal range of motion.         General: No swelling.      Right lower leg: No edema.   Skin:     General: Skin is warm and dry.      Capillary Refill: Capillary refill takes 2 to 3 seconds.   Neurological:      General: No focal deficit present.      Mental Status: She is alert and oriented to person, place, and time. Mental status is at baseline.      GCS: GCS eye subscore is 4. GCS verbal subscore is 5. GCS motor subscore is 6.      Cranial Nerves: No cranial nerve deficit.      Sensory: No sensory deficit.      Gait: Gait normal.      Deep Tendon Reflexes: Reflexes normal.   Psychiatric:         Mood and Affect: Mood normal.         Behavior: Behavior normal.         Thought Content: Thought content normal.         Judgment: Judgment normal.         Fluids    Intake/Output Summary (Last 24 hours) at 7/17/2020 1157  Last data filed at 7/16/2020 2337  Gross per 24 hour   Intake 900 ml   Output --   Net 900 ml       Laboratory  Recent Labs     07/14/20 2230 07/16/20  0500   WBC 1.8* 1.2*   RBC 4.46 4.25   HEMOGLOBIN 12.7 11.9*   HEMATOCRIT 37.7 35.8*   MCV 84.5 84.2   MCH 28.5 28.0   MCHC 33.7 33.2*   RDW 42.4 42.3   PLATELETCT 256 242   MPV 9.8 9.6     Recent Labs     07/14/20 2230 07/16/20  0500   SODIUM 137 131*   POTASSIUM 4.1 4.0   CHLORIDE 99 95*   CO2 25 24   GLUCOSE 139* 159*   BUN 9 11   CREATININE 0.37* 0.42*   CALCIUM 9.1 8.6     Recent Labs     07/14/20  2230 07/15/20  0530   APTT 30.5  --    INR 0.92 1.01               Imaging  DX-CHEST-PORTABLE (1 VIEW)   Final Result         1.  No acute cardiopulmonary disease.           Assessment/Plan  Pneumonia due to COVID-19 virus  Assessment & Plan  Currently on day 2/10 of dexamethasone and zinc, vitamin C and vitamin D    Neutropenic fever (HCC)  Assessment & Plan  Patient remains on cefepime day #3.  ANC is only 80  Reverse isolation and contact and airborn isolation with Covid  19.         Hyperglycemia  Assessment & Plan  Secondary to the dexamethasone, if elevates above 200 will need to give insulin    Malignant neoplasm of upper-outer quadrant of right breast in female, estrogen receptor positive (HCC)- (present on admission)  Assessment & Plan  Continued with Tamoxifen  As an outpatient was on chemotherapy and radiotherapy.   Continue with pain management  Will need to hold chemotherapy while she is positive for the COVID-19    Normochromic normocytic anemia  Assessment & Plan  Monitor H&H and if drops below 7/21 will need to transfuse  Check iron, B12 and folate levels       VTE prophylaxis: lovenox

## 2020-07-17 NOTE — CARE PLAN
Problem: Communication  Goal: The ability to communicate needs accurately and effectively will improve  Outcome: PROGRESSING AS EXPECTED     Problem: Safety  Goal: Will remain free from injury  Outcome: PROGRESSING AS EXPECTED  Goal: Will remain free from falls  Outcome: PROGRESSING AS EXPECTED     Problem: Bowel/Gastric:  Goal: Normal bowel function is maintained or improved  Outcome: PROGRESSING AS EXPECTED     Problem: Respiratory:  Goal: Respiratory status will improve  Outcome: PROGRESSING AS EXPECTED

## 2020-07-18 LAB
BASOPHILS # BLD AUTO: 0 % (ref 0–1.8)
BASOPHILS # BLD: 0 K/UL (ref 0–0.12)
EOSINOPHIL # BLD AUTO: 0 K/UL (ref 0–0.51)
EOSINOPHIL NFR BLD: 0 % (ref 0–6.9)
ERYTHROCYTE [DISTWIDTH] IN BLOOD BY AUTOMATED COUNT: 41.1 FL (ref 35.9–50)
HCT VFR BLD AUTO: 33.7 % (ref 37–47)
HGB BLD-MCNC: 10.9 G/DL (ref 12–16)
LYMPHOCYTES # BLD AUTO: 0.43 K/UL (ref 1–4.8)
LYMPHOCYTES NFR BLD: 62 % (ref 22–41)
MANUAL DIFF BLD: NORMAL
MCH RBC QN AUTO: 27.6 PG (ref 27–33)
MCHC RBC AUTO-ENTMCNC: 32.3 G/DL (ref 33.6–35)
MCV RBC AUTO: 85.3 FL (ref 81.4–97.8)
MONOCYTES # BLD AUTO: 0.15 K/UL (ref 0–0.85)
MONOCYTES NFR BLD AUTO: 21 % (ref 0–13.4)
MORPHOLOGY BLD-IMP: NORMAL
NEUTROPHILS # BLD AUTO: 0.12 K/UL (ref 2–7.15)
NEUTROPHILS NFR BLD: 17 % (ref 44–72)
NRBC # BLD AUTO: 0 K/UL
NRBC BLD-RTO: 0 /100 WBC
PLATELET # BLD AUTO: 245 K/UL (ref 164–446)
PLATELET BLD QL SMEAR: NORMAL
PMV BLD AUTO: 8.9 FL (ref 9–12.9)
RBC # BLD AUTO: 3.95 M/UL (ref 4.2–5.4)
RBC BLD AUTO: NORMAL
WBC # BLD AUTO: 0.7 K/UL (ref 4.8–10.8)

## 2020-07-18 PROCEDURE — 306578 KIT,PORT ACCESS 20G X 1.5INCH: Performed by: HOSPITALIST

## 2020-07-18 PROCEDURE — 700102 HCHG RX REV CODE 250 W/ 637 OVERRIDE(OP): Performed by: HOSPITALIST

## 2020-07-18 PROCEDURE — 770021 HCHG ROOM/CARE - ISO PRIVATE

## 2020-07-18 PROCEDURE — 700105 HCHG RX REV CODE 258: Performed by: INTERNAL MEDICINE

## 2020-07-18 PROCEDURE — A9270 NON-COVERED ITEM OR SERVICE: HCPCS | Performed by: INTERNAL MEDICINE

## 2020-07-18 PROCEDURE — 700102 HCHG RX REV CODE 250 W/ 637 OVERRIDE(OP): Performed by: INTERNAL MEDICINE

## 2020-07-18 PROCEDURE — 85007 BL SMEAR W/DIFF WBC COUNT: CPT

## 2020-07-18 PROCEDURE — A9270 NON-COVERED ITEM OR SERVICE: HCPCS | Performed by: HOSPITALIST

## 2020-07-18 PROCEDURE — 700111 HCHG RX REV CODE 636 W/ 250 OVERRIDE (IP): Performed by: INTERNAL MEDICINE

## 2020-07-18 PROCEDURE — 700111 HCHG RX REV CODE 636 W/ 250 OVERRIDE (IP): Performed by: HOSPITALIST

## 2020-07-18 PROCEDURE — 85027 COMPLETE CBC AUTOMATED: CPT

## 2020-07-18 PROCEDURE — 99233 SBSQ HOSP IP/OBS HIGH 50: CPT | Performed by: HOSPITALIST

## 2020-07-18 RX ADMIN — CEFEPIME 2 G: 2 INJECTION, POWDER, FOR SOLUTION INTRAVENOUS at 21:24

## 2020-07-18 RX ADMIN — DOCUSATE SODIUM 50 MG AND SENNOSIDES 8.6 MG 2 TABLET: 8.6; 5 TABLET, FILM COATED ORAL at 06:14

## 2020-07-18 RX ADMIN — ZINC SULFATE 220 MG (50 MG) CAPSULE 220 MG: CAPSULE at 06:13

## 2020-07-18 RX ADMIN — OXYCODONE HYDROCHLORIDE 10 MG: 10 TABLET ORAL at 16:52

## 2020-07-18 RX ADMIN — DEXAMETHASONE 6 MG: 6 TABLET ORAL at 06:14

## 2020-07-18 RX ADMIN — OXYCODONE HYDROCHLORIDE 10 MG: 10 TABLET ORAL at 06:14

## 2020-07-18 RX ADMIN — TBO-FILGRASTIM 480 MCG: 480 INJECTION, SOLUTION SUBCUTANEOUS at 13:23

## 2020-07-18 RX ADMIN — OMEPRAZOLE 40 MG: 20 CAPSULE, DELAYED RELEASE ORAL at 06:13

## 2020-07-18 RX ADMIN — CEFEPIME 2 G: 2 INJECTION, POWDER, FOR SOLUTION INTRAVENOUS at 06:12

## 2020-07-18 RX ADMIN — CEFEPIME 2 G: 2 INJECTION, POWDER, FOR SOLUTION INTRAVENOUS at 13:23

## 2020-07-18 RX ADMIN — OXYCODONE HYDROCHLORIDE 10 MG: 10 TABLET ORAL at 09:20

## 2020-07-18 RX ADMIN — DOCUSATE SODIUM 50 MG AND SENNOSIDES 8.6 MG 2 TABLET: 8.6; 5 TABLET, FILM COATED ORAL at 16:52

## 2020-07-18 RX ADMIN — Medication 1000 MG: at 16:52

## 2020-07-18 RX ADMIN — ATORVASTATIN CALCIUM 10 MG: 10 TABLET, FILM COATED ORAL at 21:21

## 2020-07-18 RX ADMIN — Medication 1000 MG: at 06:14

## 2020-07-18 RX ADMIN — ONDANSETRON 4 MG: 4 TABLET, ORALLY DISINTEGRATING ORAL at 09:20

## 2020-07-18 RX ADMIN — TAMOXIFEN CITRATE 10 MG: 10 TABLET, FILM COATED ORAL at 16:52

## 2020-07-18 RX ADMIN — ENOXAPARIN SODIUM 40 MG: 40 INJECTION SUBCUTANEOUS at 06:14

## 2020-07-18 ASSESSMENT — ENCOUNTER SYMPTOMS
ORTHOPNEA: 0
POLYDIPSIA: 0
STRIDOR: 0
COUGH: 1
SPEECH CHANGE: 0
NAUSEA: 0
CHILLS: 0
DIZZINESS: 0
MEMORY LOSS: 0
DIAPHORESIS: 0
PHOTOPHOBIA: 0
PND: 0
HEMOPTYSIS: 0
NEUROLOGICAL NEGATIVE: 1
DOUBLE VISION: 0
HEADACHES: 0
CARDIOVASCULAR NEGATIVE: 1
FEVER: 0
NECK PAIN: 0
NERVOUS/ANXIOUS: 0
SENSORY CHANGE: 0
VOMITING: 0
ABDOMINAL PAIN: 1
SPUTUM PRODUCTION: 0
CLAUDICATION: 0
EYES NEGATIVE: 1
BACK PAIN: 1
CONSTIPATION: 0
MYALGIAS: 0
PSYCHIATRIC NEGATIVE: 1
DEPRESSION: 0
FOCAL WEAKNESS: 0
DIARRHEA: 1

## 2020-07-18 ASSESSMENT — VISUAL ACUITY: OU: 1

## 2020-07-18 ASSESSMENT — PATIENT HEALTH QUESTIONNAIRE - PHQ9
SUM OF ALL RESPONSES TO PHQ9 QUESTIONS 1 AND 2: 0
2. FEELING DOWN, DEPRESSED, IRRITABLE, OR HOPELESS: NOT AT ALL
1. LITTLE INTEREST OR PLEASURE IN DOING THINGS: NOT AT ALL

## 2020-07-18 ASSESSMENT — LIFESTYLE VARIABLES: SUBSTANCE_ABUSE: 0

## 2020-07-18 NOTE — DOCUMENTATION QUERY
Formerly [facility], [facility]                                                                       Query Response Note      PATIENT:               KELLY TREJO  ACCT #:                  [id number]  MRN:                     [id number]  :                      1970  ADMIT DATE:       2020 9:07 PM  DISCH DATE:          RESPONDING  PROVIDER #:        [id number]           QUERY TEXT:    Morbid obesity is documented in the Registered Dietician Note dated 7/15.      Please specify if you:    NOTE:  If an appropriate response is not listed below, please respond with a new note.    The patient's Clinical Indicators include:  7/15 Dietary Note: BMI >40 (=Body mass index is 45.29 kg/m².), morbid obesity.   Treatment: Dietary consult  Risk Factors: Chronic illness (breast cancer)  Options provided:   -- Agree with dietician  assessment of morbid obesity   -- Disagree with dietician assessment of morbid obesity   -- Unable to determine      Query created by: Dagmar Cerrato on 2020 2:05 PM    RESPONSE TEXT:    Agree with dietician assessment of morbid obesity          Electronically signed by:  JEROME TRIVEDI MD 2020 7:07 AM

## 2020-07-18 NOTE — PROGRESS NOTES
[facility] Medicine Daily Progress Note    Date of Service  7/18/2020    Chief Complaint  49 y.o. female admitted 7/14/2020 with headache and sore throat.  The patient was also complaining of shortness of breath and coughing    Hospital Course    7/16/2020-Patient is a 49-year-old female comes in with generalized symptoms of shortness of breath cough fevers chills headache.  The patient is immunocompromised due to treatment for breast cancer.  The patient now is found to be COVID-19 positive.  At this point her cancer treatment will need to be put on hold.  The patient will need to be given supportive treatment.  We have started her on dexamethasone then vitamin C and vitamin D.  7/17/2020-today I had to use  services once again to speak with the patient.  At this point the patient tells me that she is feeling a lot better.  The patient is still very neutropenic the patient at this point will need to continue on Maxipime for antibiotic management.  Her absolute neutrophil count is down to 80.  She recently had chemotherapy.  She continues to have at this point tamoxifen for her breast cancer.  Patient's respiratory status is improving diarrhea is improving.  7/18/2020-patient is white blood cell continues to drop after chemotherapy.  Her ANC was down to 80 the lowest.  We are going to recheck levels again.  In the meantime the patient after discussion with oncology will be given a dose of Neupogen.  The patient at this point is not requiring oxygen.  She is requiring however fluid resuscitation as she has frequent bouts of diarrhea.  She says the diarrhea however did improve and she only had 2 bouts yesterday and 2 bouts this morning.  At this point her pain is also improving.  Her ANC needs to be above 8000 before we can discharge her home.  Yesterday's was 90.      Interval Problem Update  Recheck CBC and monitor ANC levels.  Start Neupogen  Continue with supportive treatment for COVID-19 with  dexamethasone, vitamin C vitamin D and zinc.  Optimize any pain management.  Patient should be able to go home once ANC levels are above 1000.    Consultants/Specialty  None    Code Status  Full code    Disposition  Home once stable from fevers and ANC levels are above 1000    Review of Systems  Review of Systems   Constitutional: Positive for malaise/fatigue. Negative for chills, diaphoresis and fever.   HENT: Negative.  Negative for ear discharge and tinnitus.    Eyes: Negative.  Negative for double vision and photophobia.   Respiratory: Positive for cough. Negative for hemoptysis, sputum production and stridor.    Cardiovascular: Negative.  Negative for chest pain, orthopnea, claudication, leg swelling and PND.   Gastrointestinal: Positive for abdominal pain and diarrhea. Negative for constipation, melena, nausea and vomiting.   Genitourinary: Negative.  Negative for dysuria, frequency and hematuria.   Musculoskeletal: Positive for back pain. Negative for myalgias and neck pain.   Skin: Negative.  Negative for itching and rash.   Neurological: Negative.  Negative for dizziness, sensory change, speech change, focal weakness and headaches.   Endo/Heme/Allergies: Negative.  Negative for environmental allergies and polydipsia.   Psychiatric/Behavioral: Negative.  Negative for depression, memory loss, substance abuse and suicidal ideas. The patient is not nervous/anxious.    All other systems reviewed and are negative.       Physical Exam  Temp:  [36.1 °C (97 °F)-36.6 °C (97.8 °F)] 36.4 °C (97.5 °F)  Pulse:  [72-81] 81  Resp:  [16-18] 16  BP: (106-129)/(56-73) 115/71  SpO2:  [95 %-98 %] 95 %    Physical Exam  Vitals signs and nursing note reviewed.   Constitutional:       General: She is awake.      Appearance: She is well-developed and well-groomed. She is obese. She is not ill-appearing.   HENT:      Head: Normocephalic and atraumatic.      Jaw: There is normal jaw occlusion. No trismus.      Salivary Glands: Right  salivary gland is not tender. Left salivary gland is not tender.      Right Ear: External ear normal.      Left Ear: External ear normal.      Mouth/Throat:      Mouth: Mucous membranes are moist.      Pharynx: Oropharynx is clear.   Eyes:      General: Lids are normal. Vision grossly intact.      Extraocular Movements: Extraocular movements intact.      Conjunctiva/sclera: Conjunctivae normal.      Right eye: Right conjunctiva is not injected. No exudate.     Left eye: Left conjunctiva is not injected. No exudate.     Pupils: Pupils are equal, round, and reactive to light.   Neck:      Musculoskeletal: Full passive range of motion without pain, normal range of motion and neck supple.      Thyroid: No thyroid mass.      Vascular: No hepatojugular reflux or JVD.      Trachea: No abnormal tracheal secretions or tracheal deviation.   Cardiovascular:      Rate and Rhythm: Normal rate and regular rhythm. Occasional extrasystoles are present.     Pulses: Normal pulses.      Heart sounds: Normal heart sounds.   Pulmonary:      Effort: Pulmonary effort is normal.      Breath sounds: Examination of the right-lower field reveals decreased breath sounds. Examination of the left-lower field reveals decreased breath sounds. Decreased breath sounds present.   Abdominal:      General: Abdomen is flat. Bowel sounds are normal.      Palpations: Abdomen is soft.      Tenderness: There is no abdominal tenderness. There is no right CVA tenderness or left CVA tenderness.      Hernia: No hernia is present.   Musculoskeletal:      Right lower leg: No edema.      Left lower leg: No edema.   Lymphadenopathy:      Head:      Right side of head: No submental adenopathy.      Left side of head: No submental adenopathy.      Cervical:      Right cervical: No superficial cervical adenopathy.     Left cervical: No superficial cervical adenopathy.      Upper Body:      Right upper body: No supraclavicular adenopathy.      Left upper body: No  supraclavicular adenopathy.   Skin:     General: Skin is warm and moist.      Capillary Refill: Capillary refill takes 2 to 3 seconds.      Coloration: Skin is not cyanotic.      Findings: No abrasion.   Neurological:      General: No focal deficit present.      Mental Status: She is alert and oriented to person, place, and time. Mental status is at baseline.   Psychiatric:         Attention and Perception: Attention and perception normal.         Mood and Affect: Mood normal.         Speech: Speech normal.         Behavior: Behavior is uncooperative.         Thought Content: Thought content normal.         Judgment: Judgment normal.         Fluids    Intake/Output Summary (Last 24 hours) at 7/18/2020 1041  Last data filed at 7/18/2020 0900  Gross per 24 hour   Intake 840 ml   Output --   Net 840 ml       Laboratory  Recent Labs     07/16/20  0500 07/17/20  1207 07/18/20  1008   WBC 1.2* 0.6* 0.7*   RBC 4.25 4.08* 3.95*   HEMOGLOBIN 11.9* 11.5* 10.9*   HEMATOCRIT 35.8* 34.7* 33.7*   MCV 84.2 85.0 85.3   MCH 28.0 28.2 27.6   MCHC 33.2* 33.1* 32.3*   RDW 42.3 41.6 41.1   PLATELETCT 242 247 245   MPV 9.6 9.6 8.9*     Recent Labs     07/16/20  0500   SODIUM 131*   POTASSIUM 4.0   CHLORIDE 95*   CO2 24   GLUCOSE 159*   BUN 11   CREATININE 0.42*   CALCIUM 8.6                   Imaging  DX-CHEST-PORTABLE (1 VIEW)   Final Result         1.  No acute cardiopulmonary disease.           Assessment/Plan  Pneumonia due to COVID-19 virus  Assessment & Plan  Currently on day 3/10 of dexamethasone and zinc, vitamin C and vitamin D    Neutropenic fever (HCC)  Assessment & Plan  Patient remains on cefepime day #4.  Patient at this point will need continued monitoring of her ANC level most recent ANC is 90.  The patient at this point will need to have the counts repeated daily.  Patient remains in reverse isolation.  At this point there is no data to support the fact that we should be using Neupogen or not with COVID-19 patients and  recent chemotherapy who have a absolute ANC count below thousand.  In discussing it with infectious disease as well as oncology the best treatment option would be to give her the Neupogen and see if this will increase her ANC counts.         Hyperglycemia  Assessment & Plan  Secondary to the Decadron  Continue with Accu-Cheks and sliding scale coverage.    Elevated liver enzymes  Assessment & Plan  Secondary to COVID-19 infection.  Continue to monitor liver functions    Malignant neoplasm of upper-outer quadrant of right breast in female, estrogen receptor positive (HCC)- (present on admission)  Assessment & Plan  Status post radiation and chemotherapy.  Continue with tamoxifen.    Normochromic normocytic anemia  Assessment & Plan  Monitor H&H and if drops below 7/21 will need to transfuse  B12 and folic acid normal, ferritin high and she has anemia of chronic disease         VTE prophylaxis: lovenox

## 2020-07-18 NOTE — CARE PLAN
Problem: Safety  Goal: Will remain free from falls  Outcome: PROGRESSING AS EXPECTED   Pt is aao and up self.  Pt educated to call for assistance when needed.

## 2020-07-18 NOTE — ASSESSMENT & PLAN NOTE
Elevated liver enzymes are combination of chemotherapy as well as COVID-19 infection.  Continue to monitor the liver functions  Patient does have diarrhea.

## 2020-07-19 LAB
ANISOCYTOSIS BLD QL SMEAR: ABNORMAL
BASOPHILS # BLD AUTO: 0 % (ref 0–1.8)
BASOPHILS # BLD: 0 K/UL (ref 0–0.12)
EOSINOPHIL # BLD AUTO: 0 K/UL (ref 0–0.51)
EOSINOPHIL NFR BLD: 0 % (ref 0–6.9)
ERYTHROCYTE [DISTWIDTH] IN BLOOD BY AUTOMATED COUNT: 41.4 FL (ref 35.9–50)
HCT VFR BLD AUTO: 35.7 % (ref 37–47)
HGB BLD-MCNC: 11.8 G/DL (ref 12–16)
LYMPHOCYTES # BLD AUTO: 0.92 K/UL (ref 1–4.8)
LYMPHOCYTES NFR BLD: 48.3 % (ref 22–41)
MACROCYTES BLD QL SMEAR: ABNORMAL
MANUAL DIFF BLD: NORMAL
MCH RBC QN AUTO: 27.9 PG (ref 27–33)
MCHC RBC AUTO-ENTMCNC: 33.1 G/DL (ref 33.6–35)
MCV RBC AUTO: 84.4 FL (ref 81.4–97.8)
METAMYELOCYTES NFR BLD MANUAL: 1.7 %
MICROCYTES BLD QL SMEAR: ABNORMAL
MONOCYTES # BLD AUTO: 0.51 K/UL (ref 0–0.85)
MONOCYTES NFR BLD AUTO: 26.7 % (ref 0–13.4)
MORPHOLOGY BLD-IMP: NORMAL
NEUTROPHILS # BLD AUTO: 0.43 K/UL (ref 2–7.15)
NEUTROPHILS NFR BLD: 22.4 % (ref 44–72)
NRBC # BLD AUTO: 0 K/UL
NRBC BLD-RTO: 0 /100 WBC
OVALOCYTES BLD QL SMEAR: NORMAL
PLATELET # BLD AUTO: 238 K/UL (ref 164–446)
PLATELET BLD QL SMEAR: NORMAL
PMV BLD AUTO: 9 FL (ref 9–12.9)
POIKILOCYTOSIS BLD QL SMEAR: NORMAL
PROMYELOCYTES NFR BLD MANUAL: 0.9 %
RBC # BLD AUTO: 4.23 M/UL (ref 4.2–5.4)
RBC BLD AUTO: PRESENT
WBC # BLD AUTO: 1.9 K/UL (ref 4.8–10.8)

## 2020-07-19 PROCEDURE — 700105 HCHG RX REV CODE 258: Performed by: INTERNAL MEDICINE

## 2020-07-19 PROCEDURE — 700102 HCHG RX REV CODE 250 W/ 637 OVERRIDE(OP): Performed by: HOSPITALIST

## 2020-07-19 PROCEDURE — 770021 HCHG ROOM/CARE - ISO PRIVATE

## 2020-07-19 PROCEDURE — A9270 NON-COVERED ITEM OR SERVICE: HCPCS | Performed by: INTERNAL MEDICINE

## 2020-07-19 PROCEDURE — 85007 BL SMEAR W/DIFF WBC COUNT: CPT

## 2020-07-19 PROCEDURE — A9270 NON-COVERED ITEM OR SERVICE: HCPCS | Performed by: HOSPITALIST

## 2020-07-19 PROCEDURE — 700102 HCHG RX REV CODE 250 W/ 637 OVERRIDE(OP): Performed by: INTERNAL MEDICINE

## 2020-07-19 PROCEDURE — 700111 HCHG RX REV CODE 636 W/ 250 OVERRIDE (IP): Performed by: HOSPITALIST

## 2020-07-19 PROCEDURE — 700111 HCHG RX REV CODE 636 W/ 250 OVERRIDE (IP): Performed by: INTERNAL MEDICINE

## 2020-07-19 PROCEDURE — 99233 SBSQ HOSP IP/OBS HIGH 50: CPT | Performed by: HOSPITALIST

## 2020-07-19 PROCEDURE — 85027 COMPLETE CBC AUTOMATED: CPT

## 2020-07-19 RX ADMIN — CEFEPIME 2 G: 2 INJECTION, POWDER, FOR SOLUTION INTRAVENOUS at 14:29

## 2020-07-19 RX ADMIN — TAMOXIFEN CITRATE 10 MG: 10 TABLET, FILM COATED ORAL at 17:28

## 2020-07-19 RX ADMIN — TBO-FILGRASTIM 480 MCG: 480 INJECTION, SOLUTION SUBCUTANEOUS at 14:43

## 2020-07-19 RX ADMIN — Medication 1000 MG: at 17:41

## 2020-07-19 RX ADMIN — ACETAMINOPHEN 650 MG: 325 TABLET, FILM COATED ORAL at 17:42

## 2020-07-19 RX ADMIN — DEXAMETHASONE 6 MG: 6 TABLET ORAL at 05:25

## 2020-07-19 RX ADMIN — OMEPRAZOLE 40 MG: 20 CAPSULE, DELAYED RELEASE ORAL at 05:23

## 2020-07-19 RX ADMIN — CEFEPIME 2 G: 2 INJECTION, POWDER, FOR SOLUTION INTRAVENOUS at 22:02

## 2020-07-19 RX ADMIN — ENOXAPARIN SODIUM 40 MG: 40 INJECTION SUBCUTANEOUS at 05:27

## 2020-07-19 RX ADMIN — ATORVASTATIN CALCIUM 10 MG: 10 TABLET, FILM COATED ORAL at 22:02

## 2020-07-19 RX ADMIN — ZINC SULFATE 220 MG (50 MG) CAPSULE 220 MG: CAPSULE at 05:24

## 2020-07-19 RX ADMIN — CEFEPIME 2 G: 2 INJECTION, POWDER, FOR SOLUTION INTRAVENOUS at 05:33

## 2020-07-19 RX ADMIN — DOCUSATE SODIUM 50 MG AND SENNOSIDES 8.6 MG 2 TABLET: 8.6; 5 TABLET, FILM COATED ORAL at 05:23

## 2020-07-19 RX ADMIN — Medication 1000 MG: at 05:23

## 2020-07-19 RX ADMIN — ACETAMINOPHEN 650 MG: 325 TABLET, FILM COATED ORAL at 05:23

## 2020-07-19 RX ADMIN — OXYCODONE HYDROCHLORIDE 10 MG: 10 TABLET ORAL at 08:53

## 2020-07-19 RX ADMIN — ONDANSETRON 4 MG: 2 INJECTION INTRAMUSCULAR; INTRAVENOUS at 08:53

## 2020-07-19 RX ADMIN — OXYCODONE HYDROCHLORIDE 10 MG: 10 TABLET ORAL at 05:28

## 2020-07-19 ASSESSMENT — ENCOUNTER SYMPTOMS
ABDOMINAL PAIN: 1
SENSORY CHANGE: 0
COUGH: 1
HEADACHES: 0
VOMITING: 0
DEPRESSION: 0
FALLS: 0
EYE DISCHARGE: 0
FLANK PAIN: 0
BACK PAIN: 1
DIZZINESS: 0
NEUROLOGICAL NEGATIVE: 1
SHORTNESS OF BREATH: 0
POLYDIPSIA: 0
TREMORS: 0
BLURRED VISION: 0
PALPITATIONS: 0
EYE REDNESS: 0
CARDIOVASCULAR NEGATIVE: 1
DIARRHEA: 1
ORTHOPNEA: 0
SEIZURES: 0
NAUSEA: 0
HALLUCINATIONS: 0
PSYCHIATRIC NEGATIVE: 1
EYES NEGATIVE: 1
WHEEZING: 0
CONSTIPATION: 0
CLAUDICATION: 0
STRIDOR: 0
DIAPHORESIS: 0
MEMORY LOSS: 0

## 2020-07-19 ASSESSMENT — LIFESTYLE VARIABLES: SUBSTANCE_ABUSE: 0

## 2020-07-19 NOTE — CARE PLAN
Problem: Communication  Goal: The ability to communicate needs accurately and effectively will improve  Outcome: PROGRESSING AS EXPECTED  Note: Pt expresses needs appropriately. Pt speaks English well. Translation services will be utilized as necessary. Call light within reach.     Problem: Safety  Goal: Will remain free from falls  Outcome: PROGRESSING AS EXPECTED  Note: Pt ambulates well.

## 2020-07-19 NOTE — CARE PLAN
Problem: Communication  Goal: The ability to communicate needs accurately and effectively will improve  Outcome: PROGRESSING AS EXPECTED   Primarily [place]ian speaking, able to communicate needs,  iPad in use  Problem: Safety  Goal: Will remain free from injury  Outcome: PROGRESSING AS EXPECTED  Using call light appropriately  Goal: Will remain free from falls  Outcome: PROGRESSING AS EXPECTED     Problem: Infection  Goal: Will remain free from infection  Outcome: PROGRESSING AS EXPECTED     Problem: Venous Thromboembolism (VTW)/Deep Vein Thrombosis (DVT) Prevention:  Goal: Patient will participate in Venous Thrombosis (VTE)/Deep Vein Thrombosis (DVT)Prevention Measures  Outcome: PROGRESSING AS EXPECTED   Receiving Lovenox    Problem: Bowel/Gastric:  Goal: Normal bowel function is maintained or improved  Outcome: PROGRESSING AS EXPECTED  Goal: Will not experience complications related to bowel motility  Outcome: PROGRESSING AS EXPECTED     Problem: Knowledge Deficit  Goal: Knowledge of disease process/condition, treatment plan, diagnostic tests, and medications will improve  Outcome: PROGRESSING AS EXPECTED  Goal: Knowledge of the prescribed therapeutic regimen will improve  Outcome: PROGRESSING AS EXPECTED     Problem: Discharge Barriers/Planning  Goal: Patient's continuum of care needs will be met  Outcome: PROGRESSING AS EXPECTED     Problem: Pain Management  Goal: Pain level will decrease to patient's comfort goal  Outcome: PROGRESSING AS EXPECTED     Problem: Respiratory:  Goal: Respiratory status will improve  Outcome: PROGRESSING AS EXPECTED     Problem: Fluid Volume:  Goal: Will maintain balanced intake and output  Outcome: PROGRESSING AS EXPECTED

## 2020-07-19 NOTE — PROGRESS NOTES
Manas from lab called with critical result of WBC 1.9. WBC actually trending up, previously 0.7. MD updated.

## 2020-07-19 NOTE — PROGRESS NOTES
[facility] Medicine Daily Progress Note    Date of Service  7/19/2020    Chief Complaint  49 y.o. female admitted 7/14/2020 with headache and sore throat.  The patient was also complaining of shortness of breath and coughing    Hospital Course    7/16/2020-Patient is a 49-year-old female comes in with generalized symptoms of shortness of breath cough fevers chills headache.  The patient is immunocompromised due to treatment for breast cancer.  The patient now is found to be COVID-19 positive.  At this point her cancer treatment will need to be put on hold.  The patient will need to be given supportive treatment.  We have started her on dexamethasone then vitamin C and vitamin D.  7/17/2020-today I had to use  services once again to speak with the patient.  At this point the patient tells me that she is feeling a lot better.  The patient is still very neutropenic the patient at this point will need to continue on Maxipime for antibiotic management.  Her absolute neutrophil count is down to 80.  She recently had chemotherapy.  She continues to have at this point tamoxifen for her breast cancer.  Patient's respiratory status is improving diarrhea is improving.  7/18/2020-patient is white blood cell continues to drop after chemotherapy.  Her ANC was down to 80 the lowest.  We are going to recheck levels again.  In the meantime the patient after discussion with oncology will be given a dose of Neupogen.  The patient at this point is not requiring oxygen.  She is requiring however fluid resuscitation as she has frequent bouts of diarrhea.  She says the diarrhea however did improve and she only had 2 bouts yesterday and 2 bouts this morning.  At this point her pain is also improving.  Her ANC needs to be above 8000 before we can discharge her home.  Yesterday's was 90.  7/19/2020- today patient is complaining of more abdominal pain in the epigastric region.  She is also complaining of more diarrhea.  Her ANC for  this morning is pending.  Patient did get some Granix yesterday and she will continue with this.  We will continue to monitor her white blood cell counts.  Patient is afebrile.  The patient is infected with COVID-19.  Continue at this point with supportive management.      Interval Problem Update  Pending repeat ANC level for this morning  Will need to obtain liver function test this morning as well as an ultrasound of liver as she is having severe epigastric pain.  Continue with pain management  Optimize supportive care for COVID-19  Continue with daily Granix.    Consultants/Specialty  None    Code Status  Full code    Disposition  Home once stable from fevers and ANC levels are above 1000    Review of Systems  Review of Systems   Constitutional: Positive for malaise/fatigue. Negative for diaphoresis.   HENT: Negative.  Negative for ear pain, hearing loss and nosebleeds.    Eyes: Negative.  Negative for blurred vision, discharge and redness.   Respiratory: Positive for cough. Negative for shortness of breath, wheezing and stridor.    Cardiovascular: Negative.  Negative for chest pain, palpitations, orthopnea and claudication.   Gastrointestinal: Positive for abdominal pain (Much worse today than yesterday.) and diarrhea (Patient states that she has had more diarrhea this morning.). Negative for constipation, melena, nausea and vomiting.   Genitourinary: Negative.  Negative for flank pain and urgency.   Musculoskeletal: Positive for back pain. Negative for falls and joint pain.   Skin: Negative.  Negative for itching and rash.   Neurological: Negative.  Negative for dizziness, tremors, sensory change, seizures and headaches.   Endo/Heme/Allergies: Negative.  Negative for environmental allergies and polydipsia.   Psychiatric/Behavioral: Negative.  Negative for depression, hallucinations, memory loss, substance abuse and suicidal ideas.   All other systems reviewed and are negative.       Physical Exam  Temp:  [36.5  °C (97.7 °F)-38.2 °C (100.7 °F)] 36.8 °C (98.2 °F)  Pulse:  [71-95] 82  Resp:  [15-16] 15  BP: ()/(59-76) 94/59  SpO2:  [93 %-96 %] 94 %    Physical Exam  Constitutional:       Appearance: Normal appearance. She is well-developed and underweight.   HENT:      Head: Normocephalic and atraumatic.      Right Ear: Tympanic membrane, ear canal and external ear normal.      Left Ear: Tympanic membrane, ear canal and external ear normal.      Nose: Nose normal. No congestion or rhinorrhea.      Mouth/Throat:      Mouth: Mucous membranes are moist.      Pharynx: Oropharynx is clear. No oropharyngeal exudate or posterior oropharyngeal erythema.   Eyes:      General:         Right eye: No discharge.      Extraocular Movements: Extraocular movements intact.      Conjunctiva/sclera: Conjunctivae normal.      Pupils: Pupils are equal, round, and reactive to light.   Neck:      Musculoskeletal: Normal range of motion and neck supple. No edema or neck rigidity.      Thyroid: No thyroid mass.      Vascular: No carotid bruit or JVD.   Cardiovascular:      Rate and Rhythm: Normal rate and regular rhythm.      Pulses: Normal pulses.      Heart sounds: Normal heart sounds, S1 normal and S2 normal. No murmur. No friction rub.   Pulmonary:      Effort: Pulmonary effort is normal. No respiratory distress.      Breath sounds: Normal breath sounds and air entry. No stridor.   Abdominal:      General: Bowel sounds are decreased. There is distension.      Palpations: Abdomen is soft.      Tenderness: There is abdominal tenderness in the epigastric area.   Musculoskeletal: Normal range of motion.      Right lower leg: No edema.      Left lower leg: No edema.      Right foot: Normal range of motion. No deformity or foot drop.      Left foot: Normal range of motion. No deformity or foot drop.   Feet:      Right foot:      Skin integrity: Skin integrity normal. No ulcer.      Left foot:      Skin integrity: Skin integrity normal. No ulcer.    Lymphadenopathy:      Head:      Right side of head: No submental adenopathy.      Left side of head: No submental adenopathy.      Cervical: No cervical adenopathy.      Right cervical: No superficial cervical adenopathy.     Left cervical: No superficial cervical adenopathy.      Upper Body:      Right upper body: No supraclavicular adenopathy.      Left upper body: No supraclavicular adenopathy.      Lower Body: No right inguinal adenopathy. No left inguinal adenopathy.   Skin:     General: Skin is warm and dry.      Capillary Refill: Capillary refill takes 2 to 3 seconds.      Findings: No abrasion or wound.   Neurological:      General: No focal deficit present.      Mental Status: She is alert and oriented to person, place, and time. Mental status is at baseline.      GCS: GCS eye subscore is 4. GCS verbal subscore is 5. GCS motor subscore is 6.      Cranial Nerves: Cranial nerves are intact.      Sensory: Sensation is intact.      Motor: Motor function is intact. No weakness.      Coordination: Coordination is intact.   Psychiatric:         Attention and Perception: Attention normal.         Mood and Affect: Mood and affect normal.         Speech: Speech normal.         Behavior: Behavior normal. Behavior is cooperative.         Thought Content: Thought content normal.         Judgment: Judgment normal.         Fluids    Intake/Output Summary (Last 24 hours) at 7/19/2020 0852  Last data filed at 7/18/2020 2124  Gross per 24 hour   Intake 240 ml   Output 1 ml   Net 239 ml       Laboratory  Recent Labs     07/17/20  1207 07/18/20  1008   WBC 0.6* 0.7*   RBC 4.08* 3.95*   HEMOGLOBIN 11.5* 10.9*   HEMATOCRIT 34.7* 33.7*   MCV 85.0 85.3   MCH 28.2 27.6   MCHC 33.1* 32.3*   RDW 41.6 41.1   PLATELETCT 247 245   MPV 9.6 8.9*                       Imaging  DX-CHEST-PORTABLE (1 VIEW)   Final Result         1.  No acute cardiopulmonary disease.           Assessment/Plan  Pneumonia due to COVID-19 virus  Assessment &  Plan  Currently on day 4/10 of dexamethasone and zinc, vitamin C and vitamin D    Neutropenic fever (HCC)  Assessment & Plan  Patient remains on cefepime day #5.  ANC yesterday was 120  Repeat CBC from this morning is pending  Continue with Granix      Hyperglycemia  Assessment & Plan  Unfortunately the Decadron the patient's blood sugars have elevated.  Continue at this point with Accu-Cheks and sliding scale coverage as needed.    Elevated liver enzymes  Assessment & Plan  Liver functions at this point are being monitored.  They are elevated secondary to the COVID-19 infection.  Patient this morning is having more abdominal pain  Repeat liver functions this morning as well as get an ultrasound of the abdomen.  Medicate for pain and nausea.    Malignant neoplasm of upper-outer quadrant of right breast in female, estrogen receptor positive (HCC)- (present on admission)  Assessment & Plan  Patient recently had chemotherapy and because of this she has become neutropenic.  Continue with tamoxifen for now.    Normochromic normocytic anemia  Assessment & Plan  Patient is found to have anemia of chronic disease.  B12 folic acid and iron levels were checked.  B12 and folic acid are normal.  Ferritin is up due to anemia of chronic disease.         VTE prophylaxis: lovenox

## 2020-07-19 NOTE — DOCUMENTATION QUERY
[facility]                                                                       Query Response Note      PATIENT:               KELLY TREJO  ACCT #:                  [id number]  MRN:                     [id number]  :                      1970  ADMIT DATE:       2020 9:07 PM  DISCH DATE:          RESPONDING  PROVIDER #:        [id number]           QUERY TEXT:    Patient admitted  at 2012. At 2122 a temperature of 101 F, heart rate of 108 and WBC's: 1.8 is noted. Pneumonia due to COVID-19 is documented in the progress notes.  Please clarify whether:    NOTE:  If an appropriate response is not listed below, please respond with a new note.    The patient's Clinical Indicators include:   SIRS 3/4 (T: 101 F; HR: 108; WBC's: 1.8)   H&P: Neutropenic fever; malignant neoplasm right breast    SARS CoV-2 by PCR: Detected   MD Note: Pneumonia due to COVID-19   Treatment: Dexamethasone; maxipime; RT; lab/imaging  Risk Factors: Breast cancer; immunocompromised; COVID-19 pneumonia  Options provided:   -- Sepsis present on admission   -- Sepsis developed after admission   -- Sepsis has been ruled out   -- SIRS that is unrelated to any infection   -- Unable to determine      Query created by: Dagmar Cerrato on 2020 2:21 PM    RESPONSE TEXT:    Sepsis present on admission          Electronically signed by:  FABIENNE LEACH MD 2020 1:08 AM

## 2020-07-19 NOTE — PROGRESS NOTES
Assumed pt care at 0700. Received report from Merari LOPES. A&O x4. Pt primarily Finnish speaking but able to communicate in English during assessment. Pt reports 3/10 abdominal lower pain, declines intervention at this time. Respirations even and unlabored on RA, dry cough noted.   Updated on POC, communication board updated. Bed locked and in lowest position. Call light and belongings within reach. Non-skid socks in place. Needs met, will continue to monitor.

## 2020-07-20 VITALS
HEIGHT: 60 IN | TEMPERATURE: 99.6 F | WEIGHT: 231.92 LBS | BODY MASS INDEX: 45.53 KG/M2 | SYSTOLIC BLOOD PRESSURE: 100 MMHG | HEART RATE: 92 BPM | RESPIRATION RATE: 16 BRPM | OXYGEN SATURATION: 92 % | DIASTOLIC BLOOD PRESSURE: 48 MMHG

## 2020-07-20 LAB
ANISOCYTOSIS BLD QL SMEAR: ABNORMAL
BACTERIA BLD CULT: NORMAL
BACTERIA BLD CULT: NORMAL
BASOPHILS # BLD AUTO: 0.9 % (ref 0–1.8)
BASOPHILS # BLD: 0.08 K/UL (ref 0–0.12)
EOSINOPHIL # BLD AUTO: 0 K/UL (ref 0–0.51)
EOSINOPHIL NFR BLD: 0 % (ref 0–6.9)
ERYTHROCYTE [DISTWIDTH] IN BLOOD BY AUTOMATED COUNT: 42.3 FL (ref 35.9–50)
HCT VFR BLD AUTO: 36.5 % (ref 37–47)
HGB BLD-MCNC: 12.2 G/DL (ref 12–16)
LYMPHOCYTES # BLD AUTO: 1.47 K/UL (ref 1–4.8)
LYMPHOCYTES NFR BLD: 15.8 % (ref 22–41)
MANUAL DIFF BLD: ABNORMAL
MCH RBC QN AUTO: 28 PG (ref 27–33)
MCHC RBC AUTO-ENTMCNC: 33.4 G/DL (ref 33.6–35)
MCV RBC AUTO: 83.9 FL (ref 81.4–97.8)
METAMYELOCYTES NFR BLD MANUAL: 7.9 %
MICROCYTES BLD QL SMEAR: ABNORMAL
MONOCYTES # BLD AUTO: 0.57 K/UL (ref 0–0.85)
MONOCYTES NFR BLD AUTO: 6.1 % (ref 0–13.4)
MORPHOLOGY BLD-IMP: NORMAL
MYELOCYTES NFR BLD MANUAL: 1.8 %
NEUTROPHILS # BLD AUTO: 6.2 K/UL (ref 2–7.15)
NEUTROPHILS NFR BLD: 38.6 % (ref 44–72)
NEUTS BAND NFR BLD MANUAL: 28.1 % (ref 0–10)
NRBC # BLD AUTO: 0.03 K/UL
NRBC BLD-RTO: 0.3 /100 WBC
PLATELET # BLD AUTO: 256 K/UL (ref 164–446)
PLATELET BLD QL SMEAR: NORMAL
PMV BLD AUTO: 9.5 FL (ref 9–12.9)
PROMYELOCYTES NFR BLD MANUAL: 0.9 %
RBC # BLD AUTO: 4.35 M/UL (ref 4.2–5.4)
RBC BLD AUTO: PRESENT
SIGNIFICANT IND 70042: NORMAL
SIGNIFICANT IND 70042: NORMAL
SITE SITE: NORMAL
SITE SITE: NORMAL
SOURCE SOURCE: NORMAL
SOURCE SOURCE: NORMAL
WBC # BLD AUTO: 9.3 K/UL (ref 4.8–10.8)

## 2020-07-20 PROCEDURE — 85007 BL SMEAR W/DIFF WBC COUNT: CPT

## 2020-07-20 PROCEDURE — A9270 NON-COVERED ITEM OR SERVICE: HCPCS | Performed by: HOSPITALIST

## 2020-07-20 PROCEDURE — 99239 HOSP IP/OBS DSCHRG MGMT >30: CPT | Performed by: HOSPITALIST

## 2020-07-20 PROCEDURE — 700102 HCHG RX REV CODE 250 W/ 637 OVERRIDE(OP): Performed by: INTERNAL MEDICINE

## 2020-07-20 PROCEDURE — 700111 HCHG RX REV CODE 636 W/ 250 OVERRIDE (IP): Performed by: HOSPITALIST

## 2020-07-20 PROCEDURE — 700111 HCHG RX REV CODE 636 W/ 250 OVERRIDE (IP): Performed by: INTERNAL MEDICINE

## 2020-07-20 PROCEDURE — A9270 NON-COVERED ITEM OR SERVICE: HCPCS | Performed by: INTERNAL MEDICINE

## 2020-07-20 PROCEDURE — 700105 HCHG RX REV CODE 258: Performed by: INTERNAL MEDICINE

## 2020-07-20 PROCEDURE — 700102 HCHG RX REV CODE 250 W/ 637 OVERRIDE(OP): Performed by: HOSPITALIST

## 2020-07-20 PROCEDURE — 85027 COMPLETE CBC AUTOMATED: CPT

## 2020-07-20 RX ORDER — HEPARIN SODIUM (PORCINE) LOCK FLUSH IV SOLN 100 UNIT/ML 100 UNIT/ML
300-500 SOLUTION INTRAVENOUS PRN
Status: DISCONTINUED | OUTPATIENT
Start: 2020-07-20 | End: 2020-07-20 | Stop reason: HOSPADM

## 2020-07-20 RX ORDER — AMOXICILLIN AND CLAVULANATE POTASSIUM 875; 125 MG/1; MG/1
1 TABLET, FILM COATED ORAL 2 TIMES DAILY
Qty: 20 TAB | Refills: 0 | Status: SHIPPED | OUTPATIENT
Start: 2020-07-20 | End: 2020-07-30

## 2020-07-20 RX ORDER — ZINC SULFATE 50(220)MG
220 CAPSULE ORAL DAILY
Qty: 3 CAP | Refills: 0 | Status: SHIPPED | OUTPATIENT
Start: 2020-07-21 | End: 2020-07-24

## 2020-07-20 RX ORDER — ERGOCALCIFEROL 1.25 MG/1
50000 CAPSULE ORAL
Qty: 1 CAP | Refills: 0 | Status: SHIPPED | OUTPATIENT
Start: 2020-07-23 | End: 2020-07-24

## 2020-07-20 RX ORDER — DEXAMETHASONE 6 MG/1
6 TABLET ORAL DAILY
Qty: 4 TAB | Refills: 0 | Status: SHIPPED | OUTPATIENT
Start: 2020-07-21 | End: 2020-07-25

## 2020-07-20 RX ORDER — ACETAMINOPHEN 325 MG/1
650 TABLET ORAL EVERY 6 HOURS PRN
Qty: 30 TAB | Refills: 0 | Status: SHIPPED | OUTPATIENT
Start: 2020-07-20

## 2020-07-20 RX ORDER — TAMOXIFEN CITRATE 10 MG/1
10 TABLET ORAL EVERY EVENING
Qty: 60 TAB | Refills: 3 | Status: SHIPPED | OUTPATIENT
Start: 2020-07-20

## 2020-07-20 RX ADMIN — CEFEPIME 2 G: 2 INJECTION, POWDER, FOR SOLUTION INTRAVENOUS at 05:08

## 2020-07-20 RX ADMIN — DEXAMETHASONE 6 MG: 6 TABLET ORAL at 05:08

## 2020-07-20 RX ADMIN — ZINC SULFATE 220 MG (50 MG) CAPSULE 220 MG: CAPSULE at 05:08

## 2020-07-20 RX ADMIN — ENOXAPARIN SODIUM 40 MG: 40 INJECTION SUBCUTANEOUS at 05:08

## 2020-07-20 RX ADMIN — Medication 1000 MG: at 05:08

## 2020-07-20 RX ADMIN — HEPARIN 300 UNITS: 100 SYRINGE at 12:28

## 2020-07-20 RX ADMIN — OMEPRAZOLE 40 MG: 20 CAPSULE, DELAYED RELEASE ORAL at 05:08

## 2020-07-20 RX ADMIN — ACETAMINOPHEN 650 MG: 325 TABLET, FILM COATED ORAL at 02:58

## 2020-07-20 ASSESSMENT — ENCOUNTER SYMPTOMS
SPUTUM PRODUCTION: 0
BLURRED VISION: 0
VOMITING: 0
BACK PAIN: 1
NAUSEA: 0
CARDIOVASCULAR NEGATIVE: 1
TINGLING: 0
TREMORS: 0
DIARRHEA: 1
PSYCHIATRIC NEGATIVE: 1
SENSORY CHANGE: 0
ORTHOPNEA: 0
CLAUDICATION: 0
STRIDOR: 0
HEADACHES: 0
SHORTNESS OF BREATH: 1
DEPRESSION: 0
EYES NEGATIVE: 1
DOUBLE VISION: 0
ABDOMINAL PAIN: 1
HEARTBURN: 0
WEIGHT LOSS: 0
NEUROLOGICAL NEGATIVE: 1
COUGH: 1

## 2020-07-20 ASSESSMENT — LIFESTYLE VARIABLES: SUBSTANCE_ABUSE: 0

## 2020-07-20 NOTE — DISCHARGE INSTRUCTIONS
Discharge Instructions    Discharged to home by car with relative. Discharged via wheelchair, hospital escort: Yes.  Special equipment needed: Not Applicable    Be sure to schedule a follow-up appointment with your primary care doctor or any specialists as instructed.     Discharge Plan:   Diet Plan: Discussed  Activity Level: Discussed  Confirmed Follow up Appointment: Patient to Call and Schedule Appointment  Confirmed Symptoms Management: Discussed  Medication Reconciliation Updated: Yes    I understand that a diet low in cholesterol, fat, and sodium is recommended for good health. Unless I have been given specific instructions below for another diet, I accept this instruction as my diet prescription.   Other diet: Regular diet     Special Instructions    COVID-19  COVID-19  La COVID-19 es jean infección respiratoria causada por un virus llamado coronavirus tipo 2 causante del síndrome respiratorio suzie grave (SARS-CoV-2). La enfermedad también se conoce lilian enfermedad por coronavirus o nuevo coronavirus. En algunas personas, el virus puede no ocasionar síntomas. En otras, puede producir jean infección grave. La infección puede empeorar rápidamente y causar complicaciones, lilian:  · Neumonía o infección en los pulmones.  · Síndrome de dificultad respiratoria aguda o SDRA. Se trata de la acumulación de líquido en los pulmones.  · Insuficiencia respiratoria aguda. Se trata de jean afección en la que no pasa suficiente oxígeno de los pulmones al cuerpo.  · Sepsis o choque séptico. Se trata de jean reacción grave del cuerpo ante jena infección.  · Problemas de coagulación.  · Infecciones secundarias debido a bacterias u hongos.  El virus que causa la COVID-19 es contagioso. [place] significa que puede transmitirse de jean persona a otra a través de las gotitas de saliva de la tos y de los estornudos (secreciones respiratorias).  ¿Cuáles son las causas?  Esta enfermedad es causada por un virus. Usted puede contagiarse con  gustavo virus:  · Al aspirar las gotitas que jean persona infectada elimina al toser o estornudar.  · Al tocar algo, lilian jean watts o el picaportes de jean theo, que estuvo expuesto al virus (contaminado) y luego tocarse la boca, nariz o los ojos.  ¿Qué incrementa el riesgo?  Riesgo de infección  Es más probable que se infecte con gustavo virus si:  · Vive o viaja a jean maylin donde hay un brote de COVID-19.  · Entra en contacto con jean persona enferma que recientemente viajó a jean maylin con un brote de COVID-19.  · Cuida o vive con jean persona infectada con COVID-19.  Riesgo de enfermedad grave  Es más probable que se enferme gravemente por el virus si:  · Tiene 65 años o más.  · Tiene jean enfermedad crónica que disminuye la capacidad del cuerpo para combatir las infecciones (immunocomprometido).  · Vive en un hogar de ancianos o centro de atención a naomy plazo.  · Tiene jean enfermedad prolongada (crónica), lilian las siguientes:  ? Enfermedad pulmonar crónica, que incluye la enfermedad pulmonar obstructiva crónica o asma.  ? Enfermedad cardíaca.  ? Diabetes.  ? Enfermedad renal crónica.  ? Enfermedad hepática.  · Es mike.  ¿Cuáles son los signos o síntomas?  Los síntomas de esta afección pueden ser de leves a graves. Los síntomas pueden aparecer en el término de 2 a 14 días después de teresa estado expuesto al virus. Incluyen los siguientes:  · Fiebre.  · Tos.  · Dificultad para respirar.  · Escalofríos.  · Delmy musculares.  · Dolor de garganta.  · Pérdida del gusto o el olfato.  Algunas personas también pueden tener problemas estomacales, lilian náuseas, vómitos o diarrea.  Es posible que otras personas no tengan síntomas de COVID-19.  ¿Cómo se diagnostica?  Esta afección se puede diagnosticar en función de lo siguiente:  · Shantell signos y síntomas, especialmente si:  ? Vive en jean maylin donde hay un brote de COVID-19.  ? Viajó recientemente a jean maylin donde el virus es frecuente.  ? Cuida o vive con jean persona a quien se le  diagnosticó COVID-19.  · Un examen físico.  · Análisis de laboratorio que pueden incluir:  ? Un hisopado nasal para dick jean muestra de líquido de la nariz.  ? Un hisopado de garganta para dick jean muestra de líquido de la garganta.  ? Jean muestra de mucosidad de los pulmones (esputo).  ? Análisis de geeta.  · Los estudios de diagnóstico por imágenes pueden incluir radiografías, exploración por tomografía computarizada (TC) o ecografía.  ¿Cómo se trata?  En gustavo momento, no hay ningún medicamento para tratar la COVID-19. Los medicamentos para tratar otras enfermedades se usan a modo de ensayo para comprobar si son eficaces contra la COVID-19.  El médico le informará sobre las maneras de tratar los síntomas. En la mayoría de las personas, la infección es leve y puede controlarse en el hogar con reposo, líquidos y medicamentos de venta samuel.  El tratamiento para jean infección grave suele realizarse en la unidad de cuidados intensivos (UCI) de un hospital. Puede incluir villa o más de los siguientes. Estos tratamientos se administran hasta que los síntomas mejoran.  · Recibir líquidos y medicamentos a través de jean vía intravenosa.  · Oxígeno complementario. Para administrar oxígeno extra, se utiliza un tubo en la nariz, jean mascarilla o jean mukund de oxígeno.  · Colocarlo para que se recueste boca abajo (decúbito prono). [place] facilita el ingreso de oxígeno a los pulmones.  · Uso continuo de jean máquina de presión positiva de las vías aéreas (CPAP) o de presión positiva de las vías aéreas de dos niveles (BPAP). Gustavo tratamiento utiliza jean presión de aire leve para mantener las vías respiratorias abiertas. Un tubo conectado a un motor administra oxígeno al cuerpo.  · Respirador. Gustavo tratamiento mueve el aire dentro y fuera de los pulmones mediante el uso de un tubo que se coloca en la tráquea.  · Traqueostomía. En gustavo procedimiento se hace un orificio en el calvin para insertar un tubo de  respiración.  · Oxigenación por membrana extracorpórea (OMEC). En gustavo procedimiento, los pulmones tienen la posibilidad de recuperarse al asumir las funciones del corazón y los pulmones. Suministra oxígeno al cuerpo y elimina el dióxido de carbono.  Siga estas instrucciones en maguire casa:  Estilo de perez  · Si está enfermo, quédese en maguire casa, excepto para obtener atención médica. El médico le indicará cuánto tiempo debe quedarse en casa. Llame al médico antes de buscar atención médica.  · Baron reposo en maguire casa lilian se lo haya indicado el médico.  · No consuma ningún producto que contenga nicotina o tabaco, lilian cigarrillos, cigarrillos electrónicos y tabaco de mascar. Si necesita ayuda para dejar de fumar, consulte al médico.  · Retome darlene actividades normales lilian se lo haya indicado el médico. Pregúntele al médico qué actividades son seguras para usted.  Instrucciones generales  · Use los medicamentos de venta samuel y los recetados solamente lilian se lo haya indicado el médico.  · Concetta suficiente líquido lilian para mantener la orina de color amarillo pálido.  · Concurra a todas las visitas de seguimiento lilian se lo haya indicado el médico. [place] es importante.  ¿Cómo se georgi?    No hay ninguna vacuna que ayude a prevenir la infección por la COVID-19. Sin embargo, hay medidas que puede dick para protegerse y proteger a otras personas de gustavo virus.  Para protegerse:   · No viaje a zonas donde la COVID-19 sea un riesgo. Las zonas donde se informa la presencia de la COVID-19 cambian con frecuencia. Para identificar las zonas de alto riesgo y las restricciones de viaje, consulte el sitio web de viajes de los Centers for Disease Control and Prevention (CDC) (Centros para el Control y la Prevención de Enfermedades): wwwnc.cdc.gov/travel/notices  · Si vive o debe viajar a jean maylin donde COVID-19 es un riesgo, tome precauciones para evitar infecciones.  ? Aléjese de las personas enfermas.  ? Lávese las yara frecuentemente  con agua y jabón odell 20 segundos. Use desinfectante para yara con alcohol si no dispone de agua y jabón.  ? Evite tocarse la boca, la lakeisha, los ojos o la nariz.  ? Evite salir de maguire casa, siga las indicaciones de maguire estado y de las autoridades sanitarias locales.  ? Si debe salir de maguire casa, use un barbijo de suleiman o jean mascarilla facial.  ? Desinfecte los objetos y las superficies que se tocan con frecuencia todos los días. Pueden incluir:  § Encimeras y mesas.  § Picaportes e interruptores de laura.  § Lavabos, fregaderos y grifos.  § Aparatos electrónicos tales lilian teléfonos, controles remotos, teclados, computadoras y tabletas.  Cómo proteger a los demás:  Si tiene síntomas de la COVID-19, tome medidas para evitar que el virus se propague a otras personas.  · Si sanjeev que tiene jean infección por la COVID-19, comuníquese de inmediato con maguire médico. Informe al equipo de atención médica que sanjeev que puede tener jean infección por la COVID-19.  · Quédese en maguire casa. Salga de maguire casa solo para buscar atención médica. No utilice el transporte público.  · No viaje mientras esté enfermo.  · Lávese las yara frecuentemente con agua y jabón odell 20 segundos. Usar desinfectante para yara con alcohol si no dispone de agua y jabón.  · Manténgase alejado de quienes vivan con usted. Permita que los miembros de la santo sanos cuiden a los niños y las mascotas, si es posible. Si tiene que cuidar a los niños o las mascotas, lávese las yara con frecuencia y use un barbijo. Si es posible, permanezca en maguire habitación, separado de los demás. Utilice un baño diferente.  · Asegúrese de que todas las personas que viven en maguire casa se laven eva las yara y con frecuencia.  · Tosa o estornude en un pañuelo de papel o sobre maguire manga o codo. No tosa o estornude al aire ni se cubra la boca o la nariz con la mano.  · Use un barbijo de suleiman o jean mascarilla facial.  Dónde buscar más información  · Centers for Disease Control and  Prevention (Centros para el Control y la Prevención de Enfermedades): www.cdc.gov/coronavirus/2019-ncov/index.html  · World Health Organization (Organización Mundial de la [place]): www.who.int/health-topics/coronavirus  Comuníquese con un médico si:  · Vive o ha viajado a jean maylin donde la COVID-19 es un riesgo y tiene síntomas de infección.  · Ha tenido contacto con alguien que tiene COVID-19 y usted tiene síntomas de infección.  Solicite ayuda de inmediato si:  · Tiene dificultad para respirar.  · Siente dolor u opresión en el pecho.  · Experimenta confusión.  · Tiene las uñas de los dedos y los labios de color azulado.  · Tiene dificultad para despertarse.  · Los síntomas empeoran.  Estos síntomas pueden representar un problema grave que constituye jean emergencia. No espere a elizabeth si los síntomas desaparecen. Solicite atención médica de inmediato. Comuníquese con el servicio de emergencias de maguire localidad (911 en los [place]). No conduzca por darlene propios medios hasta el hospital. Informe al personal médico de emergencias si sanjeev que tiene COVID-19.  Resumen  · La COVID-19 es jean infección respiratoria causada por un virus. También se conoce lilian enfermedad por coronavirus o nuevo coronavirus. Puede causar infecciones graves, lilian neumonía, síndrome de dificultad respiratoria aguda, insuficiencia respiratoria aguda o sepsis.  · El virus que causa la COVID-19 es contagioso. [place] significa que puede transmitirse de jean persona a otra a través de las gotitas de saliva de la tos y de los estornudos.  · Es más probable que desarrolle jean enfermedad grave si tiene 65 años o más, tiene un sistema inmunitario débil, vive en un hogar de ancianos o tiene enfermedad crónica.  · No hay ningún medicamento para tratar la COVID-19. El médico le informará sobre las maneras de tratar los síntomas.  · Alianza medidas para protegerse y proteger a los demás contra las infecciones. Lávese las yara con frecuencia y desinfecte los  objetos y las superficies que se tocan con frecuencia todos los días. Manténgase alejado de las personas que estén enfermas y use un barbijo si está enfermo.  Esta información no tiene lilian fin reemplazar el consejo del médico. Asegúrese de hacerle al médico cualquier pregunta que tenga.  Document Released: 02/15/2020 Document Revised: 05/19/2020 Document Reviewed: 02/15/2020  Elsevier Patient Education © 2020 [employer]    Neutropenia  Neutropenia  La neutropenia es linnette afección que surge cuando el nivel de un tipo de glóbulos blancos (neutrófilos) en el cuerpo es inferior al normal. Los neutrófilos, que se producen en el centro esponjoso de los huesos grandes (médula ósea), combaten las infecciones.  Los neutrófilos son la defensa principal del cuerpo contra las infecciones por bacterias y hongos. Cuanto más bajo sea el nivel de neutrófilos y cuanto más tiempo el cuerpo se quede sin ellos, más riesgos hay de sufrir linnette infección grave.  ¿Cuáles son las causas?  Esta afección puede aparecer cuando el cuerpo utiliza o destruye neutrófilos más rápido de lo que la médula ósea puede producirlos. Las causas de la neutropenia pueden ser las siguientes:  · Linnette infección por bacterias u hongos.  · Trastornos alérgicos.  · Reacciones a algunos medicamentos.  · Linnette enfermedad autoinmunitaria.  · Agrandamiento del bazo.  Esta afección también puede surgir si la médula ósea no produce la cantidad suficiente de neutrófilos. [place] problema puede deberse a lo siguiente:  · Cáncer.  · Tratamientos contra el cáncer, lilian radioterapia o quimioterapia.  · Infecciones virales.  · Medicamentos, lilian fenitoína.  · Deficiencia de vitamina B12.  · Enfermedades de la médula ósea.  · Toxinas ambientales, lilian insecticidas.  ¿Cuáles son los signos o los síntomas?  Por lo general, esta afección no causa síntomas. Si aparecen síntomas, estos suelen deberse a linnette infección preexistente. Los síntomas de linnette infección pueden incluir los  siguientes:  · Fiebre.  · Escalofríos.  · Glándulas inflamadas.  · Úlceras orales o anales.  · Tos y falta de aire.  · Erupción cutánea.  · Infección en la piel.  · Fatiga.  ¿Cómo se diagnostica?  El médico puede sospechar la presencia de neutropenia si usted tiene lo siguiente:  · Jean afección que cause neutropenia.  · Síntomas odell o después del tratamiento para el cáncer.  · Síntomas de infección, especialmente, fiebre.  · Infecciones frecuentes y poco comunes.  Esta afección se diagnostica en función de darlene antecedentes médicos y de un examen físico. También se le realizarán pruebas, por ejemplo:  · Un hemograma completo.  · Un procedimiento para extraer jean muestra de médula ósea y luego examinarla (biopsia de médula ósea).  · Jean radiografía de tórax.  · Un cultivo de orina.  · Un hemocultivo.  ¿Cómo se trata?  El tratamiento depende de la causa preexistente y de la gravedad de la afección. La neutropenia leve puede no requerir tratamiento. El tratamiento puede incluir medicamentos, por ejemplo:  · Administración de antibióticos a través de jean vía i.v.  · Medicamentos antivirales.  · Medicamentos antimicóticos.  · Un medicamento para aumentar la producción de neutrófilos (factor estimulante de colonias). Paola medicamento puede administrarse por jean vía i.v. o con jean inyección.  · Corticoesteroides administrados a través de jean vía i.v.  Si la causa de la neutropenia es jean afección preexistente, puede requerir tratamiento para cam afección. Si los medicamentos o los tratamientos contra el cáncer están causando neutropenia, el médico puede indicarle que suspenda los medicamentos o el tratamiento.  Siga estas instrucciones en maguire casa:  Medicamentos    · [place] los medicamentos de venta samuel y los recetados solamente lilian se lo haya indicado el médico.  · Colóquese la vacuna contra la gripe estacional.  · Evite el contacto con personas que recibieron jean vacuna en los últimos 30 días si cam vacuna contenía  virus vivos. No debe recibir jean vacuna con virus vivos. Las vacunas con virus vivos más frecuentes son la vacuna contra polio; sarampión, paperas, rubéola (SRP); varicela y culebrilla.  Comida y bebida  · No comparta utensilios personales.  · No coma productos no pasteurizados.  · No coma carne, pescado ni huevos crudos o poco cocidos.  · No coma frutas ni vegetales crudos sin meal.  Estilo de perez  · Evite exponerse a grupos de personas o niños.  · Evite estar cerca de personas enfermas.  · Evite estar cerca de la suciedad o del polvo, por ejemplo, en áreas de construcción o jardines.  · No cuide directamente a darlene mascotas. Evite los excrementos de los animales. No limpie las odessa de arena y las jaulas para pájaros.  · No tenga relaciones sexuales si el médico no lo ha autorizado.  Higiene    · Báñese todos los días.  · Limpie el área entre los genitales y el ano (área perineal) después de orinar o hacer deposiciones. Si es giuliano, límpiese desde adelante hacia atrás.  · Cepíllese los dientes con un cepillo de cerdas suaves antes y después de las comidas.  · No use jean afeitadora regular. Use jean rasuradora eléctrica para afeitarse.  · Lávese las yara con frecuencia. Asegúrese de que las personas que están en contacto con usted también se laven las yara. Use desinfectante para yara si no dispone de agua y jabón.  Instrucciones generales  · Siga las precauciones que le haya indicado el médico para reducir el riesgo de lesión o infección.  · Olivia medidas para evitar los humphries y las quemaduras. Por ejemplo:  ? Tenga cuidado al usar cuchillos. Siempre mukesh en dirección contraria a maguire cuerpo.  ? Mantenga los cuchillos en fundas o cubiertas protectoras cuando no los use.  ? Use guantes para horno al cocinar en el horno, la cocina o la brien.  ? Aléjese del donny.  · No use tampones, enemas o supositorios rectales excepto si lo ha autorizado maguire médico.  · Concurra a todas las visitas de seguimiento lilian se lo  haya indicado el médico. [place] es importante.  Comuníquese con un médico si:  · Tiene los siguientes síntomas:  ? Dolor de garganta.  ? Jean maylin de piel que está caliente, enrojecida o dolorosa a la palpación.  ? Tos.  ? Orina frecuente o dolor al orinar.  ? Secreción o picazón vaginal.  · Tiene los siguientes signos y síntomas:  ? Llagas en la boca o en el ano.  ? Ganglios linfáticos hinchados.  ? Líneas chapman en la piel.  ? Erupción cutánea.  Solicite ayuda inmediatamente si:  · Tiene los siguientes síntomas:  ? Fiebre.  ? Escalofríos, o comienza a temblar.  · Presenta los siguientes síntomas:  ? Tiene náuseas o vomita.  ? Siente mucha fatiga.  ? Le falta el aire.  Resumen  · La neutropenia es jean afección que surge cuando el nivel de un tipo de glóbulos blancos (neutrófilos) en el cuerpo es inferior al normal.  · Esta afección puede aparecer cuando el cuerpo utiliza o destruye neutrófilos más rápido de lo que la médula ósea puede producirlos.  · El tratamiento depende de la causa preexistente y de la gravedad de la afección. La neutropenia leve puede no requerir tratamiento.  · Siga las precauciones que le haya indicado el médico para reducir el riesgo de lesión o infección.  Esta información no tiene lilian fin reemplazar el consejo del médico. Asegúrese de hacerle al médico cualquier pregunta que tenga.  Document Released: 09/27/2006 Document Revised: 11/13/2019 Document Reviewed: 11/13/2019  Elsevier Patient Education © 2020 [employer] Inc.      · Is patient discharged on Warfarin / Coumadin?   No     Depression / Suicide Risk    As you are discharged from this [facility] facility, it is important to learn how to keep safe from harming yourself.    Recognize the warning signs:  · Abrupt changes in personality, positive or negative- including increase in energy   · Giving away possessions  · Change in eating patterns- significant weight changes-  positive or negative  · Change in sleeping patterns- unable to  sleep or sleeping all the time   · Unwillingness or inability to communicate  · Depression  · Unusual sadness, discouragement and loneliness  · Talk of wanting to die  · Neglect of personal appearance   · Rebelliousness- reckless behavior  · Withdrawal from people/activities they love  · Confusion- inability to concentrate     If you or a loved one observes any of these behaviors or has concerns about self-harm, here's what you can do:  · Talk about it- your feelings and reasons for harming yourself  · Remove any means that you might use to hurt yourself (examples: pills, rope, extension cords, firearm)  · Get professional help from the community (Mental Health, Substance Abuse, psychological counseling)  · Do not be alone:Call your Safe Contact- someone whom you trust who will be there for you.  · Call your local CRISIS HOTLINE 445-5055 or 289-080-6430  · Call your local Children's Mobile Crisis Response Team [place] (284) 015-1119 or www.Pulsar  · Call the toll free National Suicide Prevention Hotlines   · National Suicide Prevention Lifeline 973-638-XEUU (0225)  · National Hope Line Network 800-SUICIDE (062-6644)

## 2020-07-20 NOTE — DISCHARGE PLANNING
Hospital Care Management Discharge Planning       Anticipated Discharge Disposition:   · Home when ANC greater than 1000.     Action:   · Chart reviewed. Pt on RA, pending today's lab results.     Barriers to Discharge:   · ANC greater than 1000     Plan:    · Continue to provide support services and assistance with discharge planning as needed.

## 2020-07-20 NOTE — CARE PLAN
Problem: Communication  Goal: The ability to communicate needs accurately and effectively will improve  Outcome: PROGRESSING AS EXPECTED     Problem: Safety  Goal: Will remain free from injury  Outcome: PROGRESSING AS EXPECTED  Goal: Will remain free from falls  Outcome: PROGRESSING AS EXPECTED     Problem: Infection  Goal: Will remain free from infection  Outcome: PROGRESSING AS EXPECTED     Problem: Pain Management  Goal: Pain level will decrease to patient's comfort goal  Outcome: PROGRESSING AS EXPECTED     Problem: Respiratory:  Goal: Respiratory status will improve  Outcome: PROGRESSING AS EXPECTED     Problem: Discharge Barriers/Planning  Goal: Patient's continuum of care needs will be met  Outcome: PROGRESSING SLOWER THAN EXPECTED

## 2020-07-20 NOTE — PROGRESS NOTES
Assumed care of pt this am. Pt is A&O x4. Faroese speaking only. Denies pain this am. Pt has a L chest port that is accessed. Pt updated on plan of care. Hourly rounding in place.

## 2020-07-20 NOTE — PROGRESS NOTES
[facility] Medicine Daily Progress Note    Date of Service  7/20/2020    Chief Complaint  49 y.o. female admitted 7/14/2020 with headache and sore throat.  The patient was also complaining of shortness of breath and coughing    Hospital Course    7/16/2020-Patient is a 49-year-old female comes in with generalized symptoms of shortness of breath cough fevers chills headache.  The patient is immunocompromised due to treatment for breast cancer.  The patient now is found to be COVID-19 positive.  At this point her cancer treatment will need to be put on hold.  The patient will need to be given supportive treatment.  We have started her on dexamethasone then vitamin C and vitamin D.  7/17/2020-today I had to use  services once again to speak with the patient.  At this point the patient tells me that she is feeling a lot better.  The patient is still very neutropenic the patient at this point will need to continue on Maxipime for antibiotic management.  Her absolute neutrophil count is down to 80.  She recently had chemotherapy.  She continues to have at this point tamoxifen for her breast cancer.  Patient's respiratory status is improving diarrhea is improving.  7/18/2020-patient is white blood cell continues to drop after chemotherapy.  Her ANC was down to 80 the lowest.  We are going to recheck levels again.  In the meantime the patient after discussion with oncology will be given a dose of Neupogen.  The patient at this point is not requiring oxygen.  She is requiring however fluid resuscitation as she has frequent bouts of diarrhea.  She says the diarrhea however did improve and she only had 2 bouts yesterday and 2 bouts this morning.  At this point her pain is also improving.  Her ANC needs to be above 8000 before we can discharge her home.  Yesterday's was 90.  7/19/2020- today patient is complaining of more abdominal pain in the epigastric region.  She is also complaining of more diarrhea.  Her ANC for  this morning is pending.  Patient did get some Granix yesterday and she will continue with this.  We will continue to monitor her white blood cell counts.  Patient is afebrile.  The patient is infected with COVID-19.  Continue at this point with supportive management.  7/20/2020-patient today is complaining of low back pain and bilateral hip and thigh pain.  She has a mild headache.  Her diarrhea and abdominal pain are resolved.  Patient's ANC is 480.  We will repeat the level today.  If she is above 1000 she might be able to discharge home.      Interval Problem Update  Continue with reverse isolation  Continue with COVID-19 supportive care  Continue with antibiotics for neutropenic fevers  Recheck CBC if above 1000 may be able to discharge home.    Consultants/Specialty  None    Code Status  Full code    Disposition  Home once stable from fevers and ANC levels are above 1000    Review of Systems  Review of Systems   Constitutional: Positive for malaise/fatigue. Negative for weight loss.   HENT: Negative.  Negative for congestion and hearing loss.    Eyes: Negative.  Negative for blurred vision and double vision.   Respiratory: Positive for cough and shortness of breath. Negative for sputum production and stridor.    Cardiovascular: Negative.  Negative for chest pain, orthopnea and claudication.   Gastrointestinal: Positive for abdominal pain and diarrhea. Negative for heartburn, nausea and vomiting.   Genitourinary: Negative.  Negative for dysuria, frequency and urgency.   Musculoskeletal: Positive for back pain. Negative for joint pain.   Skin: Negative.  Negative for itching and rash.   Neurological: Negative.  Negative for tingling, tremors, sensory change and headaches.   Endo/Heme/Allergies: Negative.    Psychiatric/Behavioral: Negative.  Negative for depression, substance abuse and suicidal ideas.   All other systems reviewed and are negative.       Physical Exam  Temp:  [36.1 °C (97 °F)-37.6 °C (99.6 °F)]  37.6 °C (99.6 °F)  Pulse:  [70-92] 92  Resp:  [15-18] 16  BP: ()/(48-62) 100/48  SpO2:  [92 %-94 %] 92 %    Physical Exam  Constitutional:       Appearance: Normal appearance. She is well-developed. She is obese.   HENT:      Head: Normocephalic and atraumatic.      Right Ear: Tympanic membrane, ear canal and external ear normal.      Left Ear: Tympanic membrane, ear canal and external ear normal.      Nose: Nose normal. No congestion or rhinorrhea.      Mouth/Throat:      Mouth: Mucous membranes are moist.      Pharynx: Oropharynx is clear. No oropharyngeal exudate or posterior oropharyngeal erythema.   Eyes:      General:         Right eye: No discharge.      Extraocular Movements: Extraocular movements intact.      Conjunctiva/sclera: Conjunctivae normal.      Pupils: Pupils are equal, round, and reactive to light.   Neck:      Musculoskeletal: Normal range of motion and neck supple. No edema or neck rigidity.      Thyroid: No thyroid mass.      Vascular: No carotid bruit or JVD.   Cardiovascular:      Rate and Rhythm: Normal rate and regular rhythm.      Pulses: Normal pulses.      Heart sounds: Normal heart sounds, S1 normal and S2 normal. No murmur. No friction rub.   Pulmonary:      Effort: Pulmonary effort is normal. No respiratory distress.      Breath sounds: Normal breath sounds and air entry. No stridor.   Abdominal:      General: Bowel sounds are normal. There is distension.      Palpations: Abdomen is soft.      Tenderness: There is abdominal tenderness in the epigastric area.   Musculoskeletal:      Right hip: She exhibits decreased range of motion and tenderness.      Left hip: She exhibits decreased range of motion and tenderness.      Lumbar back: She exhibits decreased range of motion and tenderness.      Right lower leg: No edema.      Left lower leg: No edema.      Right foot: Normal range of motion. No deformity or foot drop.      Left foot: Normal range of motion. No deformity or foot  drop.   Feet:      Right foot:      Skin integrity: Skin integrity normal. No ulcer.      Left foot:      Skin integrity: Skin integrity normal. No ulcer.   Lymphadenopathy:      Head:      Right side of head: No submental adenopathy.      Left side of head: No submental adenopathy.      Cervical: No cervical adenopathy.      Right cervical: No superficial cervical adenopathy.     Left cervical: No superficial cervical adenopathy.      Upper Body:      Right upper body: No supraclavicular adenopathy.      Left upper body: No supraclavicular adenopathy.      Lower Body: No right inguinal adenopathy. No left inguinal adenopathy.   Skin:     General: Skin is warm and dry.      Capillary Refill: Capillary refill takes 2 to 3 seconds.      Findings: No abrasion or wound.   Neurological:      General: No focal deficit present.      Mental Status: She is alert and oriented to person, place, and time. Mental status is at baseline.      GCS: GCS eye subscore is 4. GCS verbal subscore is 5. GCS motor subscore is 6.      Cranial Nerves: Cranial nerves are intact. No cranial nerve deficit.      Sensory: Sensation is intact. No sensory deficit.      Motor: Motor function is intact. No weakness.      Coordination: Coordination abnormal.   Psychiatric:         Attention and Perception: Attention normal.         Mood and Affect: Mood and affect normal.         Speech: Speech normal.         Behavior: Behavior normal. Behavior is cooperative.         Thought Content: Thought content normal.         Judgment: Judgment normal.         Fluids    Intake/Output Summary (Last 24 hours) at 7/20/2020 1007  Last data filed at 7/19/2020 2000  Gross per 24 hour   Intake 200 ml   Output 2 ml   Net 198 ml       Laboratory  Recent Labs     07/17/20  1207 07/18/20  1008 07/19/20  0902   WBC 0.6* 0.7* 1.9*   RBC 4.08* 3.95* 4.23   HEMOGLOBIN 11.5* 10.9* 11.8*   HEMATOCRIT 34.7* 33.7* 35.7*   MCV 85.0 85.3 84.4   MCH 28.2 27.6 27.9   MCHC 33.1*  32.3* 33.1*   RDW 41.6 41.1 41.4   PLATELETCT 247 245 238   MPV 9.6 8.9* 9.0                       Imaging  DX-CHEST-PORTABLE (1 VIEW)   Final Result         1.  No acute cardiopulmonary disease.           Assessment/Plan  Pneumonia due to COVID-19 virus  Assessment & Plan  Day 5 of a combination of zinc, vitamin C, vitamin D and dexamethasone.    Neutropenic fever (HCC)  Assessment & Plan  Patient remains on cefepime day #6.  ANC yesterday was 480  Repeat CBC from this morning is pending  Continue with Granix      Hyperglycemia  Assessment & Plan  Hyperglycemia secondary to Decadron use.  Continue to monitor blood sugars and cover with insulin    Elevated liver enzymes  Assessment & Plan  Elevated liver enzymes are combination of chemotherapy as well as COVID-19 infection.  Continue to monitor the liver functions  Patient does have diarrhea.    Malignant neoplasm of upper-outer quadrant of right breast in female, estrogen receptor positive (HCC)- (present on admission)  Assessment & Plan  Status post combination of radiation and chemotherapy.  Continue with tamoxifen    Normochromic normocytic anemia  Assessment & Plan  Anemia of chronic disease.         VTE prophylaxis: lovenox

## 2020-07-20 NOTE — DISCHARGE SUMMARY
Discharge Summary    CHIEF COMPLAINT ON ADMISSION  Chief Complaint   Patient presents with   • Headache   • Sore Throat   • Cough       Reason for Admission  Fever; Cough; Body Aches      Admission Date  7/14/2020    CODE STATUS  Full Code    HPI & HOSPITAL COURSE  This is a 49 y.o. female here with  generalized symptoms of shortness of breath cough fevers chills headache.  The patient is immunocompromised due to treatment for breast cancer.  The patient was found to be COVID-19 positive.  At that point her cancer treatment was put on hold.  She was started on supportive treatment with a combination of Decadron, vitamin C vitamin D and zinc.  The patient had white blood cell count that was dropping and because of her neutropenic fever she was started on Maxipime.  The patient then continued to drop her and absolute neutrophil count.  It went down to as low as 80.  I discussed it with oncology and I recommended putting her on Granix.  With the Granix the patient came up to a white blood cell count today of 9.3.  The absolute neutrophil count is above 6000.  At this point the patient's covert infection will need to be continued to be treated as an outpatient with supportive management.  We have encouraged her to also use Tylenol for fevers or pain.  Patient is to continue self isolating.  Patient otherwise has stabilized with her diarrhea which initially was a problem for her.  Electrolytes as well as fluid status at this point has been stabilized and corrected.  Overall patient's condition has returned back to her baseline she can be discharged home with outpatient follow-ups and monitoring.    Therefore, she is discharged in good and stable condition to home with close outpatient follow-up.    The patient met 2-midnight criteria for an inpatient stay at the time of discharge.    Discharge Date  7/20/2020    FOLLOW UP ITEMS POST DISCHARGE  Follow-up with the primary care physician in 7 to 10 days    DISCHARGE  DIAGNOSES  Active Problems:    Neutropenic fever (HCC) POA: Unknown    Pneumonia due to COVID-19 virus POA: Unknown    Malignant neoplasm of upper-outer quadrant of right breast in female, estrogen receptor positive (HCC) POA: Yes    Elevated liver enzymes POA: Unknown    Hyperglycemia POA: Unknown    Normochromic normocytic anemia POA: Unknown  Resolved Problems:    * No resolved hospital problems. *      FOLLOW UP  Future Appointments   Date Time Provider Department Center   7/28/2020  2:00 PM Amy Kahn M.D. [facility] None   7[facility]30[facility]2020  [facility]   8/20/2020 10:15 AM [facility]     No follow-up provider specified.    MEDICATIONS ON DISCHARGE     Medication List      START taking these medications      Instructions   acetaminophen 325 MG Tabs  Commonly known as:  TYLENOL   Take 2 Tabs by mouth every 6 hours as needed (Mild Pain; (Pain scale 1-3); Temp greater than 100.5 F).  Dose:  650 mg     amoxicillin-clavulanate 875-125 MG Tabs  Commonly known as:  AUGMENTIN   Take 1 Tab by mouth 2 times a day for 10 days.  Dose:  1 Tab     apixaban 5mg Tabs  Commonly known as:  ELIQUIS   Take 1 Tab by mouth 2 Times a Day for 30 days.  Dose:  5 mg     ascorbic acid 1000 MG tablet  Commonly known as:  VITAMIN C   Take 1 Tab by mouth 2 Times a Day for 4 days.  Dose:  1,000 mg     vitamin D (Ergocalciferol) 1.25 MG (44298 UT) Caps capsule  Start taking on:  July 23, 2020  Commonly known as:  DRISDOL   Take 1 Cap by mouth every 7 days for 1 day.  Dose:  50,000 Units     zinc sulfate 220 (50 Zn) MG Caps  Start taking on:  July 21, 2020  Commonly known as:  ZINCATE   Take 1 Cap by mouth every day for 3 days.  Dose:  220 mg        CHANGE how you take these medications      Instructions   dexamethasone 6 MG Tabs  Start taking on:  July 21, 2020  What changed:    · medication strength  · how much to take  · when to take this  · additional instructions  Commonly known as:   DECADRON   Take 1 Tab by mouth every day for 4 days.  Dose:  6 mg        CONTINUE taking these medications      Instructions   ALLERGY MED PO   Take 1 Tab by mouth 1 time daily as needed.  Dose:  1 Tab     atorvastatin 10 MG Tabs  Commonly known as:  LIPITOR   Take 10 mg by mouth every evening.  Dose:  10 mg     lidocaine-prilocaine 2.5-2.5 % Crea  Commonly known as:  EMLA   Apply  to affected area(s) as needed.     metFORMIN 500 MG Tabs  Commonly known as:  GLUCOPHAGE   Take 500 mg by mouth 2 times a day, with meals.  Dose:  500 mg     omeprazole 40 MG delayed-release capsule  Commonly known as:  PRILOSEC   TAKE ONE CAPSULE BY MOUTH 30 MINUTES BEFORE BREAKFAST FOR STOMACH PAIN     ondansetron 8 MG Tbdp  Commonly known as:  ZOFRAN ODT   Take 8 mg by mouth every 12 hours as needed for Nausea.  Dose:  8 mg     prochlorperazine 10 MG Tabs  Commonly known as:  COMPAZINE   Take 10 mg by mouth every 8 hours as needed for Nausea/Vomiting.  Dose:  10 mg     tamoxifen 10 MG Tabs  Commonly known as:  NOLVADEX   Take 1 Tab by mouth every evening.  Dose:  10 mg            Allergies  No Known Allergies    DIET  Orders Placed This Encounter   Procedures   • Diet Order Regular     Standing Status:   Standing     Number of Occurrences:   1     Order Specific Question:   Diet:     Answer:   Regular [1]       ACTIVITY  As tolerated.  Weight bearing as tolerated    CONSULTATIONS  Hematology/oncology    PROCEDURES  None    LABORATORY  Lab Results   Component Value Date    SODIUM 131 (L) 07/16/2020    POTASSIUM 4.0 07/16/2020    CHLORIDE 95 (L) 07/16/2020    CO2 24 07/16/2020    GLUCOSE 159 (H) 07/16/2020    BUN 11 07/16/2020    CREATININE 0.42 (L) 07/16/2020        Lab Results   Component Value Date    WBC 9.3 07/20/2020    HEMOGLOBIN 12.2 07/20/2020    HEMATOCRIT 36.5 (L) 07/20/2020    PLATELETCT 256 07/20/2020        Total time of the discharge process exceeds 45 minutes.

## 2020-07-20 NOTE — CARE PLAN
Problem: Infection  Goal: Will remain free from infection  Outcome: PROGRESSING AS EXPECTED  Intervention: Assess signs and symptoms of infection  Note: Pt provided education regarding's s/sx of infection. Pt is currently on neutropenic precautions and airborne precautions. Pt is aware of the precautions and verbalizes understanding.      Problem: Communication  Goal: The ability to communicate needs accurately and effectively will improve  Note: Patient is Georgian speaking only. Encouraged to voice needs and concerns, pt verbalized understanding.

## 2020-07-20 NOTE — PROGRESS NOTES
Patient discharged home with family. Port was de-accessed, tip was intact. Pt took her copy of the discharge paperwork, signed copy placed in the chart. All belongings collected by the patient and taken home.

## 2020-07-27 RX ORDER — HEPARIN SODIUM (PORCINE) LOCK FLUSH IV SOLN 100 UNIT/ML 100 UNIT/ML
500 SOLUTION INTRAVENOUS PRN
Status: CANCELLED | OUTPATIENT
Start: 2020-09-17

## 2020-07-27 RX ORDER — 0.9 % SODIUM CHLORIDE 0.9 %
VIAL (ML) INJECTION PRN
Status: CANCELLED | OUTPATIENT
Start: 2020-09-17

## 2020-07-27 RX ORDER — HEPARIN SODIUM (PORCINE) LOCK FLUSH IV SOLN 100 UNIT/ML 100 UNIT/ML
500 SOLUTION INTRAVENOUS PRN
Status: CANCELLED | OUTPATIENT
Start: 2020-09-16

## 2020-07-27 RX ORDER — PROCHLORPERAZINE MALEATE 10 MG
10 TABLET ORAL EVERY 6 HOURS PRN
Status: CANCELLED | OUTPATIENT
Start: 2020-09-17

## 2020-07-27 RX ORDER — 0.9 % SODIUM CHLORIDE 0.9 %
10 VIAL (ML) INJECTION PRN
Status: CANCELLED | OUTPATIENT
Start: 2020-09-16

## 2020-07-27 RX ORDER — ONDANSETRON 2 MG/ML
4 INJECTION INTRAMUSCULAR; INTRAVENOUS PRN
Status: CANCELLED | OUTPATIENT
Start: 2020-09-17

## 2020-07-27 RX ORDER — SODIUM CHLORIDE 9 MG/ML
INJECTION, SOLUTION INTRAVENOUS CONTINUOUS
Status: CANCELLED | OUTPATIENT
Start: 2020-09-17

## 2020-07-27 RX ORDER — 0.9 % SODIUM CHLORIDE 0.9 %
10 VIAL (ML) INJECTION PRN
Status: CANCELLED | OUTPATIENT
Start: 2020-09-17

## 2020-07-27 RX ORDER — 0.9 % SODIUM CHLORIDE 0.9 %
VIAL (ML) INJECTION PRN
Status: CANCELLED | OUTPATIENT
Start: 2020-09-16

## 2020-07-27 RX ORDER — 0.9 % SODIUM CHLORIDE 0.9 %
3 VIAL (ML) INJECTION PRN
Status: CANCELLED | OUTPATIENT
Start: 2020-09-17

## 2020-07-27 RX ORDER — ONDANSETRON 8 MG/1
8 TABLET, ORALLY DISINTEGRATING ORAL PRN
Status: CANCELLED | OUTPATIENT
Start: 2020-09-17

## 2020-07-27 RX ORDER — 0.9 % SODIUM CHLORIDE 0.9 %
3 VIAL (ML) INJECTION PRN
Status: CANCELLED | OUTPATIENT
Start: 2020-09-16

## 2020-07-28 ENCOUNTER — APPOINTMENT (OUTPATIENT)
Dept: RADIATION ONCOLOGY | Facility: MEDICAL CENTER | Age: 50
End: 2020-07-28
Attending: RADIOLOGY

## 2020-07-30 ENCOUNTER — APPOINTMENT (OUTPATIENT)
Dept: ONCOLOGY | Facility: MEDICAL CENTER | Age: 50
End: 2020-07-30
Attending: INTERNAL MEDICINE
Payer: COMMERCIAL

## 2020-08-07 ENCOUNTER — HOSPITAL ENCOUNTER (EMERGENCY)
Facility: MEDICAL CENTER | Age: 50
End: 2020-08-08
Attending: EMERGENCY MEDICINE | Admitting: EMERGENCY MEDICINE
Payer: MEDICAID

## 2020-08-07 ENCOUNTER — APPOINTMENT (OUTPATIENT)
Dept: RADIOLOGY | Facility: MEDICAL CENTER | Age: 50
End: 2020-08-07
Attending: EMERGENCY MEDICINE
Payer: MEDICAID

## 2020-08-07 DIAGNOSIS — S82.892A CLOSED FRACTURE OF LEFT ANKLE, INITIAL ENCOUNTER: Primary | ICD-10-CM

## 2020-08-07 PROCEDURE — 700111 HCHG RX REV CODE 636 W/ 250 OVERRIDE (IP): Performed by: EMERGENCY MEDICINE

## 2020-08-07 PROCEDURE — 29515 APPLICATION SHORT LEG SPLINT: CPT

## 2020-08-07 PROCEDURE — 302874 HCHG BANDAGE ACE 2 OR 3""

## 2020-08-07 PROCEDURE — 99284 EMERGENCY DEPT VISIT MOD MDM: CPT

## 2020-08-07 PROCEDURE — 73610 X-RAY EXAM OF ANKLE: CPT | Mod: LT

## 2020-08-07 PROCEDURE — 96372 THER/PROPH/DIAG INJ SC/IM: CPT | Mod: XU

## 2020-08-07 RX ORDER — ONDANSETRON 2 MG/ML
4 INJECTION INTRAMUSCULAR; INTRAVENOUS ONCE
Status: COMPLETED | OUTPATIENT
Start: 2020-08-07 | End: 2020-08-07

## 2020-08-07 RX ORDER — MORPHINE SULFATE 10 MG/ML
6 INJECTION, SOLUTION INTRAMUSCULAR; INTRAVENOUS ONCE
Status: COMPLETED | OUTPATIENT
Start: 2020-08-07 | End: 2020-08-07

## 2020-08-07 RX ADMIN — ONDANSETRON 4 MG: 2 INJECTION INTRAMUSCULAR; INTRAVENOUS at 22:43

## 2020-08-07 RX ADMIN — MORPHINE SULFATE 6 MG: 10 INJECTION INTRAVENOUS at 22:43

## 2020-08-07 ASSESSMENT — FIBROSIS 4 INDEX: FIB4 SCORE: 2.13

## 2020-08-07 ASSESSMENT — PAIN DESCRIPTION - DESCRIPTORS: DESCRIPTORS: SORE

## 2020-08-08 VITALS
WEIGHT: 210 LBS | SYSTOLIC BLOOD PRESSURE: 124 MMHG | HEART RATE: 98 BPM | BODY MASS INDEX: 41.23 KG/M2 | RESPIRATION RATE: 18 BRPM | TEMPERATURE: 98 F | OXYGEN SATURATION: 94 % | HEIGHT: 60 IN | DIASTOLIC BLOOD PRESSURE: 61 MMHG

## 2020-08-08 PROCEDURE — A9270 NON-COVERED ITEM OR SERVICE: HCPCS | Performed by: EMERGENCY MEDICINE

## 2020-08-08 PROCEDURE — 302171 PADDED BELT WHEELCHAIR: Performed by: EMERGENCY MEDICINE

## 2020-08-08 PROCEDURE — 700102 HCHG RX REV CODE 250 W/ 637 OVERRIDE(OP): Performed by: EMERGENCY MEDICINE

## 2020-08-08 RX ORDER — HYDROCODONE BITARTRATE AND ACETAMINOPHEN 5; 325 MG/1; MG/1
1 TABLET ORAL EVERY 6 HOURS PRN
Qty: 12 TAB | Refills: 0 | Status: SHIPPED | OUTPATIENT
Start: 2020-08-08 | End: 2020-08-11

## 2020-08-08 RX ORDER — HYDROCODONE BITARTRATE AND ACETAMINOPHEN 5; 325 MG/1; MG/1
1 TABLET ORAL ONCE
Status: COMPLETED | OUTPATIENT
Start: 2020-08-08 | End: 2020-08-08

## 2020-08-08 RX ADMIN — HYDROCODONE BITARTRATE AND ACETAMINOPHEN 1 TABLET: 5; 325 TABLET ORAL at 00:53

## 2020-08-08 NOTE — ED PROVIDER NOTES
ED Provider Note    CHIEF COMPLAINT  Chief Complaint   Patient presents with   • Foot Pain     L foot pain       HPI  Zahida Rodriguez is a 49 y.o. female who presents to the emerge department complaint of left ankle pain.  Patient was walking out and she missed a step landing hard on her left ankle.  She had pain and swelling around that left ankle since that time.  Denies any other injury.  Did not injure her head, neck, chest, abdomen or other extremities.  No numbness or tingling.  No other acute injuries or complaints.  Pain is moderate to severe worsened by movement and palpation.    REVIEW OF SYSTEMS  See HPI for further details.       Musculoskeletal: No other musculoskeletal injuries or complaints.      PAST MEDICAL HISTORY  Past Medical History:   Diagnosis Date   • Arthritis     hands ,    • Cancer (HCC) 2013    breast cancer/ chemo and radiation   • Heart burn    • Malignant neoplasm of upper-outer quadrant of right female breast (HCC)    • Snoring    • Urinary incontinence        FAMILY HISTORY  Family History   Problem Relation Age of Onset   • Cancer Father         prostate   • Cancer Other        SOCIAL HISTORY  Social History     Socioeconomic History   • Marital status:      Spouse name: Not on file   • Number of children: Not on file   • Years of education: Not on file   • Highest education level: Not on file   Occupational History   • Not on file   Social Needs   • Financial resource strain: Not on file   • Food insecurity     Worry: Not on file     Inability: Not on file   • Transportation needs     Medical: Patient refused     Non-medical: Patient refused   Tobacco Use   • Smoking status: Never Smoker   • Smokeless tobacco: Never Used   Substance and Sexual Activity   • Alcohol use: Not Currently     Comment: rare social   • Drug use: Never   • Sexual activity: Not on file   Lifestyle   • Physical activity     Days per week: Not on file     Minutes per session: Not on file   •  Stress: Not on file   Relationships   • Social connections     Talks on phone: Not on file     Gets together: Not on file     Attends Taoist service: Not on file     Active member of club or organization: Not on file     Attends meetings of clubs or organizations: Not on file     Relationship status: Not on file   • Intimate partner violence     Fear of current or ex partner: Not on file     Emotionally abused: Not on file     Physically abused: Not on file     Forced sexual activity: Not on file   Other Topics Concern   • Not on file   Social History Narrative   • Not on file       SURGICAL HISTORY  Past Surgical History:   Procedure Laterality Date   • PB MASTECTOMY, SIMPLE, COMPLETE Left 4/10/2020    Procedure: MASTECTOMY-SIMPLE;  Surgeon: Codie Carreno M.D.;  Location: SURGERY [facility];  Service: General   • PB MASTECTOMY, MODIFIED RADICAL Right 4/10/2020    Procedure: MASTECTOMY, MODIFIED RADICAL;  Surgeon: Codie Carreno M.D.;  Location: SURGERY [facility];  Service: General   • AXILLARY NODE DISSECTION Right 4/10/2020    Procedure: LYMPHADENECTOMY, AXILLARY-RIGHT;  Surgeon: Codie Carreno M.D.;  Location: SURGERY [facility];  Service: General   • CATH PLACEMENT Left 4/10/2020    Procedure: INSERTION, CATHETER FOR PORT PLACEMENT;  Surgeon: Codie Carreno M.D.;  Location: SURGERY [facility];  Service: General   • OTHER  2013    lumpectomy   • GYN SURGERY  1989,1991    c sec x 2   • OTHER ORTHOPEDIC SURGERY      lumbar discectomy       CURRENT MEDICATIONS  Home Medications    **Home medications have not yet been reviewed for this encounter**         ALLERGIES  No Known Allergies    PHYSICAL EXAM  VITAL SIGNS: /118   Pulse 96   Temp 36.6 °C (97.9 °F) (Temporal)   Resp 20   Ht 1.524 m (5')   Wt 95.3 kg (210 lb)   SpO2 99%   BMI 41.01 kg/m²    Constitutional:  HENT: Normocephalic, Atraumatic, Bilateral external ears normal, Oropharynx moist, No oral exudates, Nose  normal.   Cardiovascular: Normal heart rate, Normal rhythm, No murmurs, No rubs, No gallops.   Thorax & Lungs: Normal breath sounds, No respiratory distress,   Abdomen: Bowel sounds normal, Soft, No tenderness  Musculoskeletal: Good range of motion in all major joints.  Left lower extremities no tenderness of the proximal fibula.  There is no tenderness over the tibia.  There is tenderness and swelling around the medial lateral malleolus.  No foot tenderness.  Good pulses.  Normal sensation.  Neurologic: Alert,  Normal sensory function, No focal deficits noted.   Psychiatric: Affect anxious    RADIOLOGY/PROCEDURES  DX-ANKLE 3+ VIEWS LEFT   Final Result         1.  Lateral malleolus fracture.   2.  Widening of the ankle syndesmosis compatible with ankle mortise joint injury               COURSE & MEDICAL DECISION MAKING  Pertinent Labs & Imaging studies reviewed. (See chart for details)      Patient presents with left ankle pain after a fall.  She is significant tenderness and swelling around the ankle.    The patient was worked up with an x-ray.  She is given some morphine and Zofran for pain.  X-ray confirms a fracture was also widened mortise concern for underlying ligamentous injury.    Patient's pain is well controlled.  She is put in a 3 sided short leg splint by the tech and nursing staff.    She is reexamined and she is a normal neurovascular dam her pain is improved.    The patient states she is unable to use crutches because of her malignancy and her overall strength.  We will get her a wheelchair.     The patient will be discharged home with a prescription for Norco.    The patient is advised to follow-up orthopedics next week.  She will return to the ER for pain, or other concerns.  Advised to use a wheelchair, keep her leg elevated.  Return for pain numbness weakness or other concerns.  Questions are answered, agreeable to plan.  Discharged in good condition.    The patient was noted to have elevated  blood pressure while in the ER and was counseled to see their doctor within one wee to have this rechecked.    Efren Navarro M.D.  [address] [facility] [address]  [phone]    Schedule an appointment as soon as possible for a visit in 3 days      In prescribing controlled substances to this patient, I certify that I have obtained and reviewed the medical history of Rekha Rodriguez. I have also made a good pooja effort to obtain applicable records from other providers who have treated the patient and no other records are available at this time.     I have conducted a physical exam and documented it. I have reviewed Ms. Ryan Rodriguez’s prescription history as maintained by the [place] Prescription Monitoring Program.     I have assessed the patient’s risk for abuse, dependency, and addiction using the validated Opioid Risk Tool available at https://www.mdcalc.com/ajlmqc-yqiq-qrki-ort-narcotic-abuse.     Given the above, I believe the benefits of controlled substance therapy outweigh the risks. The reasons for prescribing controlled substances include non-narcotic, oral analgesic alternatives have been inadequate for pain control. Accordingly, I have discussed the risk and benefits, treatment plan, and alternative therapies with the patient.     Efren Navarro M.D.  [address] [facility] [address]  [phone]    Schedule an appointment as soon as possible for a visit in 3 days          FINAL IMPRESSION  1. Closed fracture of left ankle, initial encounter  HYDROcodone-acetaminophen (NORCO) 5-325 MG Tab per tablet    DME Wheelchair       Addendum: The patient's family were present in room served with .  They refused and continued to refuse an iPad  and is disappointed in the family.  3.         Electronically signed by: Waqar Bhtat M.D., 8/7/2020 10:48 PM

## 2020-08-08 NOTE — DISCHARGE PLANNING
Received request to obtain w/c for patient. Spoke with Pito at [facility]. Order and Face to Face along with face sheet faxed to [phone] per his instructions. Awaiting call back from their on-call .

## 2020-08-08 NOTE — ED NOTES
Pt's wheelchair has arrived. Pt is educated on the use with  at bedside and is ready for discharge.

## 2020-08-08 NOTE — DISCHARGE INSTRUCTIONS
Keep your ankle in the splint.  Rest, elevation.  Follow-up with orthopedics next week.  Take Norco for pain.  Return for pain, numbness, tendon, weakness or other concerns.

## 2020-08-08 NOTE — ED TRIAGE NOTES
Chief Complaint   Patient presents with   • Foot Pain     L foot pain     Pt wheeled to triage for above. Pt missed a step and fell onto her L foot and twisted. Pt can minimally move her toes, and has sensation. Pt appears very uncomfortable and is tearful in triage. L ankle has noted swelling and bruising to it.  Pt has breast cancer with last chemo treatment a month ago.   Charge aware of pt.   Pt to yellow pod.    /118   Pulse 96   Temp 36.6 °C (97.9 °F) (Temporal)   Resp 20   Ht 1.524 m (5')   Wt 95.3 kg (210 lb)   SpO2 99%   BMI 41.01 kg/m²

## 2020-08-08 NOTE — ED NOTES
Cj from case coordination informed the pt's wheelchair will be delivered to the front lobby shortly for the pt to go home with.

## 2020-08-08 NOTE — DISCHARGE PLANNING
Corey from [employer] [facility] delivered w/c to patient in room. Physician's order, face sheet, and face to face given to Corey as requested.

## 2020-08-08 NOTE — FACE TO FACE
Face to Face Note  -  Durable Medical Equipment    Waqar Bhatt M.D. - NPI: [id number]  I certify that this patient is under my care and that they had a durable medical equipment(DME)face to face encounter by myself that meets the physician DME face-to-face encounter requirements with this patient on:    Date of encounter:   Patient:                    MRN:                       YOB: 2020  Zahida Rodriguez  [id number]  1970     The encounter with the patient was in whole, or in part, for the following medical condition, which is the primary reason for durable medical equipment:  Other - fracture    I certify that, based on my findings, the following durable medical equipment is medically necessary:  Wheel Chair.    HOME O2 Saturation Measurements:(Values must be present for Home Oxygen orders)         ,     ,         My Clinical findings support the need for the above equipment due to:  Other - fracture    Supporting Symptoms: pain from fracture

## 2020-08-08 NOTE — ED NOTES
MD at bedside prior to discharge. Pt given discharge instructions and verbalized understanding with prescription stapled to discharge paperwork using a  over the ipad, all questions are answered, signed copy in chart. Narcotic consent signed and informed not to drive while on narcotics. Pt ambulatory to lobby, gait steady, discharged to spouse. Pt took all belongings from room.

## 2020-08-08 NOTE — DISCHARGE PLANNING
Called [facility] back and spoke with Pito. He informed me they were unable to deliver the w/c until they obtained approval from Medicaid during business hours.  Called [facility] and spoke with Corey who informed me he could have the w/c here in about 20 min.

## 2020-08-14 ENCOUNTER — PATIENT OUTREACH (OUTPATIENT)
Dept: CASE MANAGEMENT | Facility: MEDICAL CENTER | Age: 50
End: 2020-08-14

## 2020-08-14 NOTE — PROGRESS NOTES
"On August 14, 2020, Oncology Social Worker Michelle Arizmendi contacted pt. via telephone.  OSW Ugo spoke with pt. in Samoan throughout entire encounter.  Pt. shared \"if it's not one thing it's another with me.\"  Pt. shared she was hospitalized after roque COVID19.  Pt. shared she was cleared by the Health Department after she completed her quarantine but states she believes she still had it.  Pt. shared she broke her ankle and was scheduled for surgery.  One of the requirements was to take a COVID19 test.  Pt. shared she was informed her test came back positive.  Pt. shared she knew she still had COVID19 because she still cannot taste and her lips are still pale.  Pt. shared she cannot have surgery for her ankle until she is cleared from COVID19.  Pt.'s  shared he cannot go back to work since he is caring for pt.  Pt.'s  shared he was able to catch her from having a fall as a result of trying to get up.  Pt.'s  shared he is going to request another month off from work.    "

## 2020-08-19 ENCOUNTER — PATIENT OUTREACH (OUTPATIENT)
Dept: CASE MANAGEMENT | Facility: MEDICAL CENTER | Age: 50
End: 2020-08-19

## 2020-08-19 NOTE — PROGRESS NOTES
"On August 19, 2020, Oncology Social Worker Michelle Arizmendi contacted pt. via telephone.  SUSAN Arizmendi spoke to pt. in Kinyarwanda throughout encounter.  SUSAN Arizmendi informed pt. there was a check from [facility] mailed to pt. to assist with living expenses.  Pt. stated \"Thank God for the help, it's going to help us jesse much.\"  Pt. shared [employer] is providing her hot meals once a week and they also received voucher for [employer].  Pt.'s  came on the call to thank SUSAN Arizmendi for her help and \"for all that's been done for his wife.\"  SUSAN Arizmendi encouraged them to call if they have additional needs.  Pt. agreed she would do so.      "

## 2020-08-24 ENCOUNTER — PATIENT OUTREACH (OUTPATIENT)
Dept: CASE MANAGEMENT | Facility: MEDICAL CENTER | Age: 50
End: 2020-08-24

## 2020-08-24 NOTE — PROGRESS NOTES
"On August 24, 2020, Oncology Social Worker Michelle Arizmendi contacted pt. via telephone.  SUSAN Arizmendi spoke to pt. in Romanian throughout entire conversation.  Pt. shared her foot surgery is scheduled for August 27, 2020 and she has been cleared from COVID.  Pt. shared following surgery she will start chemo again followed by radiation.  SUSAN Arizmendi inquired as to whether pt. was in need of wheelchair.  Pt. stated \"they took the wheelchair away because it was not covered by the insurance.\"  SUSAN Arizmendi provided pt.'s  with name of [facility] and telephone number.  Pt.'s  shared he had some errands to run and then will call them regarding wheelchair.    "

## 2020-08-25 ENCOUNTER — PRE-ADMISSION TESTING (OUTPATIENT)
Dept: ADMISSIONS | Facility: MEDICAL CENTER | Age: 50
End: 2020-08-25
Attending: ORTHOPAEDIC SURGERY
Payer: MEDICAID

## 2020-08-25 DIAGNOSIS — Z01.810 PRE-OPERATIVE CARDIOVASCULAR EXAMINATION: ICD-10-CM

## 2020-08-25 DIAGNOSIS — Z01.812 PRE-OPERATIVE LABORATORY EXAMINATION: ICD-10-CM

## 2020-08-25 LAB
ANION GAP SERPL CALC-SCNC: 14 MMOL/L (ref 7–16)
BUN SERPL-MCNC: 10 MG/DL (ref 8–22)
CALCIUM SERPL-MCNC: 9.9 MG/DL (ref 8.5–10.5)
CHLORIDE SERPL-SCNC: 100 MMOL/L (ref 96–112)
CO2 SERPL-SCNC: 24 MMOL/L (ref 20–33)
CREAT SERPL-MCNC: 0.31 MG/DL (ref 0.5–1.4)
EKG IMPRESSION: NORMAL
EST. AVERAGE GLUCOSE BLD GHB EST-MCNC: 214 MG/DL
GLUCOSE SERPL-MCNC: 153 MG/DL (ref 65–99)
HBA1C MFR BLD: 9.1 % (ref 0–5.6)
HCG SERPL QL: NEGATIVE
POTASSIUM SERPL-SCNC: 4 MMOL/L (ref 3.6–5.5)
SODIUM SERPL-SCNC: 138 MMOL/L (ref 135–145)

## 2020-08-25 PROCEDURE — 83036 HEMOGLOBIN GLYCOSYLATED A1C: CPT

## 2020-08-25 PROCEDURE — 80048 BASIC METABOLIC PNL TOTAL CA: CPT

## 2020-08-25 PROCEDURE — 93005 ELECTROCARDIOGRAM TRACING: CPT

## 2020-08-25 PROCEDURE — 36415 COLL VENOUS BLD VENIPUNCTURE: CPT

## 2020-08-25 PROCEDURE — 93010 ELECTROCARDIOGRAM REPORT: CPT | Performed by: INTERNAL MEDICINE

## 2020-08-25 PROCEDURE — 84703 CHORIONIC GONADOTROPIN ASSAY: CPT

## 2020-08-25 RX ORDER — ASCORBIC ACID 500 MG
1000 TABLET ORAL EVERY MORNING
COMMUNITY

## 2020-08-25 NOTE — DISCHARGE PLANNING
DISCHARGE PLANNING NOTE     Procedure: Procedure(s):  ORIF, ANKLE  Procedure Date: 8/27/2020  Insurance: Payor: MEDICAID FFS / Plan: [place] MEDICAID   Equipment currently available at home?  crutches, wheelchair and kneeling scooter  Steps into the home? 3 no railing   Steps within the home? 0, single wide trailer  Toilet height? Standard  Type of shower? tub-shower  Who will be with you during your recovery? spouse  Is Outpatient Physical Therapy set up after surgery? No   Planning same day discharge?Yes     Plan: It is anticipated that she will be NWB for a number of weeks.  Reviewed Equipment Resource list and provided a copy to the patient.Recommended a tub transfer bench and raised toilet seat. Provided the [facility] application in Scottish. Anticipate discharge home.

## 2020-08-26 NOTE — OR NURSING
COVID-19 Pre-surgery screenin. Do you have an undiagnosed respiratory illness or symptoms such as coughing or sneezing? No  a. Onset of Sx No  b. Acute vs. chronic respiratory illness No    2. Do you have an unexplained fever greater than 100.4 degrees Fahrenheit or 38 degrees Celsius?     No    3. Have you had direct exposure to a patient who tested positive for Covid-19?    No    4. Have you had any loss of your sense of taste or smell? Have you had N/V or sore throat? No    Patient has been informed of visitor policy and asked to wear a mask upon entering the hospital   Yes

## 2020-08-27 ENCOUNTER — ANESTHESIA EVENT (OUTPATIENT)
Dept: SURGERY | Facility: MEDICAL CENTER | Age: 50
End: 2020-08-27

## 2020-08-27 ENCOUNTER — APPOINTMENT (OUTPATIENT)
Dept: RADIOLOGY | Facility: MEDICAL CENTER | Age: 50
End: 2020-08-27
Attending: ORTHOPAEDIC SURGERY

## 2020-08-27 ENCOUNTER — APPOINTMENT (OUTPATIENT)
Dept: RADIOLOGY | Facility: MEDICAL CENTER | Age: 50
End: 2020-08-27
Attending: ORTHOPAEDIC SURGERY
Payer: MEDICAID

## 2020-08-27 ENCOUNTER — HOSPITAL ENCOUNTER (OUTPATIENT)
Facility: MEDICAL CENTER | Age: 50
End: 2020-08-27
Attending: ORTHOPAEDIC SURGERY | Admitting: ORTHOPAEDIC SURGERY
Payer: MEDICAID

## 2020-08-27 ENCOUNTER — ANESTHESIA (OUTPATIENT)
Dept: SURGERY | Facility: MEDICAL CENTER | Age: 50
End: 2020-08-27

## 2020-08-27 VITALS
OXYGEN SATURATION: 95 % | HEIGHT: 60 IN | WEIGHT: 228.4 LBS | BODY MASS INDEX: 44.84 KG/M2 | DIASTOLIC BLOOD PRESSURE: 70 MMHG | RESPIRATION RATE: 18 BRPM | HEART RATE: 98 BPM | TEMPERATURE: 97.4 F | SYSTOLIC BLOOD PRESSURE: 126 MMHG

## 2020-08-27 DIAGNOSIS — S82.62XD DISPLACED FRACTURE OF LATERAL MALLEOLUS OF LEFT FIBULA, SUBSEQUENT ENCOUNTER FOR CLOSED FRACTURE WITH ROUTINE HEALING: ICD-10-CM

## 2020-08-27 LAB — GLUCOSE BLD-MCNC: 153 MG/DL (ref 65–99)

## 2020-08-27 PROCEDURE — 700102 HCHG RX REV CODE 250 W/ 637 OVERRIDE(OP): Performed by: ANESTHESIOLOGY

## 2020-08-27 PROCEDURE — A9270 NON-COVERED ITEM OR SERVICE: HCPCS | Performed by: ORTHOPAEDIC SURGERY

## 2020-08-27 PROCEDURE — 700102 HCHG RX REV CODE 250 W/ 637 OVERRIDE(OP): Performed by: ORTHOPAEDIC SURGERY

## 2020-08-27 PROCEDURE — 160035 HCHG PACU - 1ST 60 MINS PHASE I: Performed by: ORTHOPAEDIC SURGERY

## 2020-08-27 PROCEDURE — A9270 NON-COVERED ITEM OR SERVICE: HCPCS | Performed by: ANESTHESIOLOGY

## 2020-08-27 PROCEDURE — 501445 HCHG STAPLER, SKIN DISP: Performed by: ORTHOPAEDIC SURGERY

## 2020-08-27 PROCEDURE — A6454 SELF-ADHER BAND W>=3" <5"/YD: HCPCS | Performed by: ORTHOPAEDIC SURGERY

## 2020-08-27 PROCEDURE — 700101 HCHG RX REV CODE 250: Performed by: ORTHOPAEDIC SURGERY

## 2020-08-27 PROCEDURE — 501838 HCHG SUTURE GENERAL: Performed by: ORTHOPAEDIC SURGERY

## 2020-08-27 PROCEDURE — 700111 HCHG RX REV CODE 636 W/ 250 OVERRIDE (IP): Performed by: ANESTHESIOLOGY

## 2020-08-27 PROCEDURE — 160009 HCHG ANES TIME/MIN: Performed by: ORTHOPAEDIC SURGERY

## 2020-08-27 PROCEDURE — 73600 X-RAY EXAM OF ANKLE: CPT | Mod: LT

## 2020-08-27 PROCEDURE — 82962 GLUCOSE BLOOD TEST: CPT

## 2020-08-27 PROCEDURE — 700105 HCHG RX REV CODE 258: Performed by: ANESTHESIOLOGY

## 2020-08-27 PROCEDURE — C1713 ANCHOR/SCREW BN/BN,TIS/BN: HCPCS | Performed by: ORTHOPAEDIC SURGERY

## 2020-08-27 PROCEDURE — 64447 NJX AA&/STRD FEMORAL NRV IMG: CPT | Performed by: ORTHOPAEDIC SURGERY

## 2020-08-27 PROCEDURE — 160025 RECOVERY II MINUTES (STATS): Performed by: ORTHOPAEDIC SURGERY

## 2020-08-27 PROCEDURE — 500054 HCHG BANDAGE, ELASTIC 6: Performed by: ORTHOPAEDIC SURGERY

## 2020-08-27 PROCEDURE — 160047 HCHG PACU  - EA ADDL 30 MINS PHASE II: Performed by: ORTHOPAEDIC SURGERY

## 2020-08-27 PROCEDURE — 160036 HCHG PACU - EA ADDL 30 MINS PHASE I: Performed by: ORTHOPAEDIC SURGERY

## 2020-08-27 PROCEDURE — 160002 HCHG RECOVERY MINUTES (STAT): Performed by: ORTHOPAEDIC SURGERY

## 2020-08-27 PROCEDURE — 160046 HCHG PACU - 1ST 60 MINS PHASE II: Performed by: ORTHOPAEDIC SURGERY

## 2020-08-27 PROCEDURE — 700101 HCHG RX REV CODE 250: Performed by: ANESTHESIOLOGY

## 2020-08-27 PROCEDURE — 160041 HCHG SURGERY MINUTES - EA ADDL 1 MIN LEVEL 4: Performed by: ORTHOPAEDIC SURGERY

## 2020-08-27 PROCEDURE — 700105 HCHG RX REV CODE 258: Performed by: ORTHOPAEDIC SURGERY

## 2020-08-27 PROCEDURE — 160029 HCHG SURGERY MINUTES - 1ST 30 MINS LEVEL 4: Performed by: ORTHOPAEDIC SURGERY

## 2020-08-27 PROCEDURE — 160048 HCHG OR STATISTICAL LEVEL 1-5: Performed by: ORTHOPAEDIC SURGERY

## 2020-08-27 PROCEDURE — 64445 NJX AA&/STRD SCIATIC NRV IMG: CPT | Performed by: ORTHOPAEDIC SURGERY

## 2020-08-27 DEVICE — PLATE DISTAL FIBULA 5H (2TX2+2TX1=6): Type: IMPLANTABLE DEVICE | Site: ANKLE | Status: FUNCTIONAL

## 2020-08-27 DEVICE — SCREW 3.5 MM LOCKING TI X 12MM LONG (6TX8+2TX5=58): Type: IMPLANTABLE DEVICE | Site: ANKLE | Status: FUNCTIONAL

## 2020-08-27 DEVICE — SCREW 2.5 MM LOCKING TI X 10MM LONG (6TX8=48): Type: IMPLANTABLE DEVICE | Site: ANKLE | Status: FUNCTIONAL

## 2020-08-27 DEVICE — SCREW 3.5 MM NON-LOCKING TI X 14MM LONG (6TX8+2TX5=58): Type: IMPLANTABLE DEVICE | Site: ANKLE | Status: FUNCTIONAL

## 2020-08-27 DEVICE — SCREW 3.5 MM NON-LOCKING TI X 45MM LONG (6TX8=48): Type: IMPLANTABLE DEVICE | Site: ANKLE | Status: FUNCTIONAL

## 2020-08-27 DEVICE — WIRE 1.6MMX150MM K-WIRE (10 PACK) (8TX10=80): Type: IMPLANTABLE DEVICE | Site: ANKLE | Status: FUNCTIONAL

## 2020-08-27 RX ORDER — HYDROMORPHONE HYDROCHLORIDE 1 MG/ML
0.1 INJECTION, SOLUTION INTRAMUSCULAR; INTRAVENOUS; SUBCUTANEOUS
Status: DISCONTINUED | OUTPATIENT
Start: 2020-08-27 | End: 2020-08-27 | Stop reason: HOSPADM

## 2020-08-27 RX ORDER — LIDOCAINE HYDROCHLORIDE 20 MG/ML
INJECTION, SOLUTION EPIDURAL; INFILTRATION; INTRACAUDAL; PERINEURAL PRN
Status: DISCONTINUED | OUTPATIENT
Start: 2020-08-27 | End: 2020-08-27 | Stop reason: SURG

## 2020-08-27 RX ORDER — CEFAZOLIN SODIUM 1 G/3ML
INJECTION, POWDER, FOR SOLUTION INTRAMUSCULAR; INTRAVENOUS PRN
Status: DISCONTINUED | OUTPATIENT
Start: 2020-08-27 | End: 2020-08-27 | Stop reason: SURG

## 2020-08-27 RX ORDER — DEXAMETHASONE SODIUM PHOSPHATE 4 MG/ML
INJECTION, SOLUTION INTRA-ARTICULAR; INTRALESIONAL; INTRAMUSCULAR; INTRAVENOUS; SOFT TISSUE PRN
Status: DISCONTINUED | OUTPATIENT
Start: 2020-08-27 | End: 2020-08-27 | Stop reason: SURG

## 2020-08-27 RX ORDER — SUCCINYLCHOLINE/SOD CL,ISO/PF 200MG/10ML
SYRINGE (ML) INTRAVENOUS PRN
Status: DISCONTINUED | OUTPATIENT
Start: 2020-08-27 | End: 2020-08-27 | Stop reason: SURG

## 2020-08-27 RX ORDER — OXYCODONE HCL 5 MG/5 ML
10 SOLUTION, ORAL ORAL
Status: COMPLETED | OUTPATIENT
Start: 2020-08-27 | End: 2020-08-27

## 2020-08-27 RX ORDER — HALOPERIDOL 5 MG/ML
1 INJECTION INTRAMUSCULAR
Status: DISCONTINUED | OUTPATIENT
Start: 2020-08-27 | End: 2020-08-27 | Stop reason: HOSPADM

## 2020-08-27 RX ORDER — ACETAMINOPHEN 500 MG
1000 TABLET ORAL ONCE
Status: COMPLETED | OUTPATIENT
Start: 2020-08-27 | End: 2020-08-27

## 2020-08-27 RX ORDER — LABETALOL HYDROCHLORIDE 5 MG/ML
5 INJECTION, SOLUTION INTRAVENOUS
Status: DISCONTINUED | OUTPATIENT
Start: 2020-08-27 | End: 2020-08-27 | Stop reason: HOSPADM

## 2020-08-27 RX ORDER — BUPIVACAINE HYDROCHLORIDE 2.5 MG/ML
INJECTION, SOLUTION EPIDURAL; INFILTRATION; INTRACAUDAL PRN
Status: DISCONTINUED | OUTPATIENT
Start: 2020-08-27 | End: 2020-08-27 | Stop reason: SURG

## 2020-08-27 RX ORDER — HYDROMORPHONE HYDROCHLORIDE 1 MG/ML
0.2 INJECTION, SOLUTION INTRAMUSCULAR; INTRAVENOUS; SUBCUTANEOUS
Status: DISCONTINUED | OUTPATIENT
Start: 2020-08-27 | End: 2020-08-27 | Stop reason: HOSPADM

## 2020-08-27 RX ORDER — SODIUM CHLORIDE, SODIUM LACTATE, POTASSIUM CHLORIDE, CALCIUM CHLORIDE 600; 310; 30; 20 MG/100ML; MG/100ML; MG/100ML; MG/100ML
INJECTION, SOLUTION INTRAVENOUS CONTINUOUS
Status: DISCONTINUED | OUTPATIENT
Start: 2020-08-27 | End: 2020-08-27 | Stop reason: HOSPADM

## 2020-08-27 RX ORDER — MIDAZOLAM HYDROCHLORIDE 1 MG/ML
INJECTION INTRAMUSCULAR; INTRAVENOUS PRN
Status: DISCONTINUED | OUTPATIENT
Start: 2020-08-27 | End: 2020-08-27 | Stop reason: SURG

## 2020-08-27 RX ORDER — SODIUM CHLORIDE, SODIUM LACTATE, POTASSIUM CHLORIDE, CALCIUM CHLORIDE 600; 310; 30; 20 MG/100ML; MG/100ML; MG/100ML; MG/100ML
INJECTION, SOLUTION INTRAVENOUS
Status: DISCONTINUED | OUTPATIENT
Start: 2020-08-27 | End: 2020-08-27 | Stop reason: SURG

## 2020-08-27 RX ORDER — HYDROMORPHONE HYDROCHLORIDE 1 MG/ML
0.4 INJECTION, SOLUTION INTRAMUSCULAR; INTRAVENOUS; SUBCUTANEOUS
Status: DISCONTINUED | OUTPATIENT
Start: 2020-08-27 | End: 2020-08-27 | Stop reason: HOSPADM

## 2020-08-27 RX ORDER — CELECOXIB 200 MG/1
200 CAPSULE ORAL ONCE
Status: COMPLETED | OUTPATIENT
Start: 2020-08-27 | End: 2020-08-27

## 2020-08-27 RX ORDER — OXYCODONE HCL 5 MG/5 ML
5 SOLUTION, ORAL ORAL
Status: COMPLETED | OUTPATIENT
Start: 2020-08-27 | End: 2020-08-27

## 2020-08-27 RX ORDER — HYDROMORPHONE HYDROCHLORIDE 2 MG/ML
INJECTION, SOLUTION INTRAMUSCULAR; INTRAVENOUS; SUBCUTANEOUS PRN
Status: DISCONTINUED | OUTPATIENT
Start: 2020-08-27 | End: 2020-08-27 | Stop reason: SURG

## 2020-08-27 RX ORDER — OXYCODONE HYDROCHLORIDE 5 MG/1
5 TABLET ORAL EVERY 4 HOURS PRN
Qty: 30 TAB | Refills: 0 | Status: SHIPPED | OUTPATIENT
Start: 2020-08-27 | End: 2020-09-01

## 2020-08-27 RX ORDER — MAGNESIUM HYDROXIDE 1200 MG/15ML
LIQUID ORAL
Status: COMPLETED | OUTPATIENT
Start: 2020-08-27 | End: 2020-08-27

## 2020-08-27 RX ORDER — ONDANSETRON 2 MG/ML
4 INJECTION INTRAMUSCULAR; INTRAVENOUS
Status: DISCONTINUED | OUTPATIENT
Start: 2020-08-27 | End: 2020-08-27 | Stop reason: HOSPADM

## 2020-08-27 RX ORDER — DIPHENHYDRAMINE HYDROCHLORIDE 50 MG/ML
12.5 INJECTION INTRAMUSCULAR; INTRAVENOUS
Status: DISCONTINUED | OUTPATIENT
Start: 2020-08-27 | End: 2020-08-27 | Stop reason: HOSPADM

## 2020-08-27 RX ORDER — ONDANSETRON 2 MG/ML
INJECTION INTRAMUSCULAR; INTRAVENOUS PRN
Status: DISCONTINUED | OUTPATIENT
Start: 2020-08-27 | End: 2020-08-27 | Stop reason: SURG

## 2020-08-27 RX ADMIN — MIDAZOLAM HYDROCHLORIDE 2 MG: 1 INJECTION, SOLUTION INTRAMUSCULAR; INTRAVENOUS at 09:09

## 2020-08-27 RX ADMIN — CELECOXIB 200 MG: 200 CAPSULE ORAL at 09:20

## 2020-08-27 RX ADMIN — DEXAMETHASONE SODIUM PHOSPHATE 8 MG: 4 INJECTION, SOLUTION INTRA-ARTICULAR; INTRALESIONAL; INTRAMUSCULAR; INTRAVENOUS; SOFT TISSUE at 09:34

## 2020-08-27 RX ADMIN — CEFAZOLIN 2 G: 330 INJECTION, POWDER, FOR SOLUTION INTRAMUSCULAR; INTRAVENOUS at 09:33

## 2020-08-27 RX ADMIN — FENTANYL CITRATE 50 MCG: 50 INJECTION INTRAMUSCULAR; INTRAVENOUS at 09:09

## 2020-08-27 RX ADMIN — ONDANSETRON 4 MG: 2 INJECTION INTRAMUSCULAR; INTRAVENOUS at 10:48

## 2020-08-27 RX ADMIN — BUPIVACAINE HYDROCHLORIDE 30 ML: 2.5 INJECTION, SOLUTION EPIDURAL; INFILTRATION; INTRACAUDAL; PERINEURAL at 09:14

## 2020-08-27 RX ADMIN — OXYCODONE HYDROCHLORIDE 10 MG: 5 SOLUTION ORAL at 11:48

## 2020-08-27 RX ADMIN — SODIUM CHLORIDE, POTASSIUM CHLORIDE, SODIUM LACTATE AND CALCIUM CHLORIDE: 600; 310; 30; 20 INJECTION, SOLUTION INTRAVENOUS at 09:21

## 2020-08-27 RX ADMIN — LIDOCAINE HYDROCHLORIDE 100 MG: 20 INJECTION, SOLUTION EPIDURAL; INFILTRATION; INTRACAUDAL at 09:27

## 2020-08-27 RX ADMIN — SODIUM CHLORIDE, POTASSIUM CHLORIDE, SODIUM LACTATE AND CALCIUM CHLORIDE: 600; 310; 30; 20 INJECTION, SOLUTION INTRAVENOUS at 08:04

## 2020-08-27 RX ADMIN — FENTANYL CITRATE 50 MCG: 50 INJECTION INTRAMUSCULAR; INTRAVENOUS at 12:03

## 2020-08-27 RX ADMIN — FENTANYL CITRATE 50 MCG: 50 INJECTION INTRAMUSCULAR; INTRAVENOUS at 12:31

## 2020-08-27 RX ADMIN — PROPOFOL 150 MG: 10 INJECTION, EMULSION INTRAVENOUS at 09:27

## 2020-08-27 RX ADMIN — Medication 100 MG: at 09:27

## 2020-08-27 RX ADMIN — POVIDONE-IODINE 15 ML: 10 SOLUTION TOPICAL at 08:04

## 2020-08-27 RX ADMIN — ACETAMINOPHEN 1000 MG: 500 TABLET ORAL at 09:19

## 2020-08-27 RX ADMIN — BUPIVACAINE HYDROCHLORIDE 30 ML: 2.5 INJECTION, SOLUTION EPIDURAL; INFILTRATION; INTRACAUDAL; PERINEURAL at 09:11

## 2020-08-27 RX ADMIN — FENTANYL CITRATE 50 MCG: 50 INJECTION INTRAMUSCULAR; INTRAVENOUS at 09:27

## 2020-08-27 RX ADMIN — PROPOFOL 50 MG: 10 INJECTION, EMULSION INTRAVENOUS at 10:30

## 2020-08-27 RX ADMIN — HYDROMORPHONE HYDROCHLORIDE 0.6 MG: 2 INJECTION, SOLUTION INTRAMUSCULAR; INTRAVENOUS; SUBCUTANEOUS at 10:29

## 2020-08-27 ASSESSMENT — PAIN SCALES - GENERAL: PAIN_LEVEL: 3

## 2020-08-27 ASSESSMENT — FIBROSIS 4 INDEX: FIB4 SCORE: 2.13

## 2020-08-27 NOTE — DISCHARGE INSTRUCTIONS
ACTIVITY: Rest and take it easy for the first 24 hours.  A responsible adult is recommended to remain with you during that time.  It is normal to feel sleepy.  We encourage you to not do anything that requires balance, judgment or coordination.    MILD FLU-LIKE SYMPTOMS ARE NORMAL. YOU MAY EXPERIENCE GENERALIZED MUSCLE ACHES, THROAT IRRITATION, HEADACHE AND/OR SOME NAUSEA.    FOR 24 HOURS DO NOT:  Drive, operate machinery or run household appliances.  Drink beer or alcoholic beverages.   Make important decisions or sign legal documents.    SPECIAL INSTRUCTIONS:    Toe Touch weight bearing to left lower extremity  Keep dressing clean and dry.    DIET: To avoid nausea, slowly advance diet as tolerated, avoiding spicy or greasy foods for the first day.  Add more substantial food to your diet according to your physician's instructions.  INCREASE FLUIDS AND FIBER TO AVOID CONSTIPATION.    SURGICAL DRESSING/BATHING:   SHOWER WITH WOUND COVERED Starting tomorrow      FOLLOW-UP APPOINTMENT:  A follow-up appointment should be arranged with your doctor in 1-2 weeks; call to schedule.    You should CALL YOUR PHYSICIAN if you develop:  Fever greater than 101 degrees F.  Pain not relieved by medication, or persistent nausea or vomiting.  Excessive bleeding (blood soaking through dressing) or unexpected drainage from the wound.  Extreme redness or swelling around the incision site, drainage of pus or foul smelling drainage.  Inability to urinate or empty your bladder within 8 hours.  Problems with breathing or chest pain.    You should call 911 if you develop problems with breathing or chest pain.    If you are unable to contact your doctor or surgical center, you should go to the nearest emergency room or urgent care center.  Physician's telephone #: Dr. Navarro [phone]    If any questions arise, call your doctor.  If your doctor is not available, please feel free to call the Surgical Center at [phone].  The Center  is open Monday through Friday from 7AM to 7PM.  You can also call the HEALTH HOTLINE open 24 hours/day, 7 days/week and speak to a nurse at [phone], or toll free at [phone].    A registered nurse may call you a few days after your surgery to see how you are doing after your procedure.    MEDICATIONS: Resume taking daily medication.  Take prescribed pain medication with food.  If no medication is prescribed, you may take non-aspirin pain medication if needed.  PAIN MEDICATION CAN BE VERY CONSTIPATING.  Take a stool softener or laxative such as senokot, pericolace, or milk of magnesia if needed.    Prescription given for Oxycodone.  Last pain medication given at 11:48am.    If your physician has prescribed pain medication that includes Acetaminophen (Tylenol), do not take additional Acetaminophen (Tylenol) while taking the prescribed medication.    Depression / Suicide Risk    As you are discharged from this [facility] facility, it is important to learn how to keep safe from harming yourself.    Recognize the warning signs:  · Abrupt changes in personality, positive or negative- including increase in energy   · Giving away possessions  · Change in eating patterns- significant weight changes-  positive or negative  · Change in sleeping patterns- unable to sleep or sleeping all the time   · Unwillingness or inability to communicate  · Depression  · Unusual sadness, discouragement and loneliness  · Talk of wanting to die  · Neglect of personal appearance   · Rebelliousness- reckless behavior  · Withdrawal from people/activities they love  · Confusion- inability to concentrate     If you or a loved one observes any of these behaviors or has concerns about self-harm, here's what you can do:  · Talk about it- your feelings and reasons for harming yourself  · Remove any means that you might use to hurt yourself (examples: pills, rope, extension cords, firearm)  · Get professional help from the community  (Mental Health, Substance Abuse, psychological counseling)  · Do not be alone:Call your Safe Contact- someone whom you trust who will be there for you.  · Call your local CRISIS HOTLINE 667-8634 or 141-673-3514  · Call your local Children's Mobile Crisis Response Team [place] (507) 392-6980 or www.Lucidux  · Call the toll free National Suicide Prevention Hotlines   · National Suicide Prevention Lifeline 612-930-ZLOF (5948)  National Hope Line Network 800-SUICIDE (989-6987)

## 2020-08-27 NOTE — PROGRESS NOTES
Pt's  here to pick-up pt. Pt escorted to their car via wheelchair with all her personal belongings.

## 2020-08-27 NOTE — ANESTHESIA TIME REPORT
Anesthesia Start and Stop Event Times     Date Time Event    8/27/2020 0920 Ready for Procedure     0921 Anesthesia Start     1105 Anesthesia Stop        Responsible Staff  08/27/20    Name Role Begin End    Leonard Taylor M.D. Anesth 0921 1105        Preop Diagnosis (Free Text):  Pre-op Diagnosis     Left distal fibula fracture        Preop Diagnosis (Codes):    Post op Diagnosis  Closed fracture of left distal fibula      Premium Reason  Non-Premium    Comments:

## 2020-08-27 NOTE — ANESTHESIA PROCEDURE NOTES
Peripheral Block    Date/Time: 8/27/2020 9:09 AM  Performed by: Leonard Taylor M.D.  Authorized by: Leonard Taylor M.D.     Start Time:  8/27/2020 9:09 AM  End Time:  8/27/2020 9:14 AM  Reason for Block: at surgeon's request and post-op pain management    patient identified, IV checked, site marked, risks and benefits discussed, surgical consent, monitors and equipment checked, pre-op evaluation and timeout performed    Patient Position:  Supine  Prep: ChloraPrep    Monitoring:  Heart rate, continuous pulse ox and cardiac monitor  Block Region:  Lower Extremity  Lower Extremity - Block Type:  SCIATIC nerve block, lateral approach and Selective FEMORAL nerve block at the Adductor Canal    Laterality:  Left  Procedures: ultrasound guided  Image captured, interpreted and electronically stored.  Local Infiltration:  Lidocaine  Strength:  1 %  Dose:  3 ml  Block Type:  Single-shot  Needle Localization:  Ultrasound guidance  Injection Assessment:  Negative aspiration for heme, no paresthesia on injection, incremental injection and local visualized surrounding nerve on ultrasound  Evidence of intravascular injection: No     US Guided Sciatic Nerve Block   US probe placed several cm proximal to popliteal crease on posterior thigh and scanned caudad and cephalad until Sciatic Nerve (SN) identified superficial/lateral to popliteal artery.  Needle inserted lateral to probe in an in plane approach under direct visualization to a perineural position.  After negative aspiration LA injected with ease and visualized surrounding the SN. Image in chart.    US Guided Selective Femoral Nerve Block at Adductor Canal:   US probe placed at mid-thigh level on externally rotated leg and femur identified.  Probe directed medially until Sartorius Muscle (SM), Femoral Artery (FA) and Saphenous Nerve (SN) identified in Adductor Canal (AC).  Needle inserted anterolateral to probe in an in plane approach into  a subsartorial perivascular perineural position.  After negative aspiration LA injected with ease and visualized spreading within the AC. Image in chart.

## 2020-08-27 NOTE — ANESTHESIA PROCEDURE NOTES
Airway    Date/Time: 8/27/2020 9:27 AM  Performed by: Leonard Taylor M.D.  Authorized by: Leonard Taylor M.D.     Location:  OR  Urgency:  Elective  Indications for Airway Management:  Anesthesia      Spontaneous Ventilation: absent    Sedation Level:  Deep  Preoxygenated: Yes    Patient Position:  Sniffing  Mask Difficulty Assessment:  0 - not attempted  Final Airway Type:  Endotracheal airway  Final Endotracheal Airway:  ETT  Cuffed: Yes    Technique Used for Successful ETT Placement:  Direct laryngoscopy    Insertion Site:  Oral  Blade Type:  Crow  Laryngoscope Blade/Videolaryngoscope Blade Size:  3  ETT Size (mm):  7.0  Measured from:  Teeth  ETT to Teeth (cm):  20  Placement Verified by: auscultation and capnometry    Cormack-Lehane Classification:  Grade I - full view of glottis  Number of Attempts at Approach:  1

## 2020-08-27 NOTE — ANESTHESIA PREPROCEDURE EVALUATION
Relevant Problems   No relevant active problems       Physical Exam    Airway   Mallampati: II  TM distance: >3 FB  Neck ROM: full       Cardiovascular - normal exam  Rhythm: regular  Rate: normal  (-) murmur     Dental - normal exam           Pulmonary - normal exam  Breath sounds clear to auscultation     Abdominal    Neurological - normal exam                 Anesthesia Plan    ASA 2       Plan - general and peripheral nerve block     Peripheral nerve block will be post-op pain control  Airway plan will be ETT        Induction: intravenous    Postoperative Plan: Postoperative administration of opioids is intended.    Pertinent diagnostic labs and testing reviewed    Informed Consent:    Anesthetic plan and risks discussed with patient.    Use of blood products discussed with: patient whom consented to blood products.

## 2020-08-27 NOTE — OP REPORT
DATE OF SERVICE:  08/27/2020    PREOPERATIVE DIAGNOSIS:  Left lateral malleolus fracture.    PREOPERATIVE DIAGNOSES:  1.  Left lateral malleolus fracture.  2.  Left ankle syndesmotic injury.    SURGEON:  Efren Navarro MD    ASSISTANT:  Manas Meng DO    ANESTHESIOLOGIST:  Leonard Taylor MD.    ANESTHESIA TYPE:  General.    SPECIMENS:  None.    ESTIMATED BLOOD LOSS:  Minimal.    COMPLICATIONS:  None.    TOURNIQUET TIME:  Not applicable.    OPERATIVE INDICATIONS:  The patient is a pleasant 49-year-old female, who   sustained a left ankle fracture about 3 weeks ago.  She is currently on   chemotherapy for breast cancer, was also found to be COVID positive at that   time.  She now had a negative COVID test.  She has been in the splint although   it does appear she has been ambulating on it.  She is not having much pain in   the ankle.  She has a normal neurovascular exam and skin envelope.  Given   these findings, she is an appropriately indicated candidate for open reduction   and internal fixation for unstable lateral malleolus fracture.  I discussed   the risks, benefits, and alternatives with her including risk of infection,   wound healing complications, neurovascular injury, blood loss, DVT, PE,   malunion, nonunion, stiffness, arthritis, symptomatic hardware and the medical   risks of anesthesia.  We discussed benefits including improved chance of   union and acceptable alignment and alternatives including nonoperative   management.  Her informed consent was signed and documented.  I met with her   preoperatively and marked the operative extremity.    OPERATIVE COURSE:  She underwent general anesthesia and was positioned supine.    All bony prominences were well padded.  Left lower extremity was prepped and   draped in sterile orthopedic fashion using ChloraPrep and the surgical team   scrubbed in.  A procedural pause was conducted to verify correct patient,   correct extremity, presence of the  surgeon's initials on the operative   extremity and administration of IV antibiotics, in this case Ancef.  Following   generalized agreement, lateral approach to distal fibula was performed   incising the skin and subcutaneous tissue sharply.  A fair amount of callus   was present.  This was debrided away.  The fracture site was identified and   debrided distally.  Her bone was quite poor quality.  Attempts to reduce the   fracture were resulting in comminution of a very small distal fragment.    Additionally, the fracture appeared to be more consistent with a Moreno A   fibula fracture in terms of its location.  A cotton test was performed   intraoperatively, which demonstrated clear diastasis of the fibula from the   tibia consistent with a syndesmotic injury and the fracture behaved more as a   syndesmotic injury than a Moreno B fibula fracture.  The fracture site was   debrided as needed to allow reduction of the fracture.  Distal portion of the   talofibular ligament, which was exacerbating her fracture deformity was   released.  The proximal portion of this, which was attached to the proximal   fragment was left intact.    We were then able to reduce the fracture.  The fracture was a bit short when   reduced; however, given her bone quality, we elected to accept the slight   shortening at the fibular fracture site as further attempts to reduce it   jeopardized the fixation and given her poor bone quality.  We selected an [facility]   distal fibula locking plate and positioned this on the distal femur.  We   secured this with a three 2.5 mm locking screws.  The plate was then reduced   to the bone and we used this to help reduce our fracture.  We secured the   plate proximally with two 3.5 mm nonlocking screws and one 3.5 mm locking   screw.  We placed an additional 2.5 mm locking screw distally.  When we were   satisfied with our fibula fixation, we drilled for and placed 2 syndesmotic   screws under fluoroscopic  guidance after reducing the syndesmosis with a Moreno   clamp under direct visualization of the wound.  The screws were tightened by   hand.  Good dorsiflexion was noted after placement of the screws.  When that   was complete, all instruments were removed.  Final fluoroscopic images   demonstrated excellent reduction of the fracture.  Good position of all   hardware.  We thoroughly irrigated the wound and we closed the wound in layers   with 0 Vicryl, 2-0 Vicryl, and 2-0 nylon sutures.  Sterile dressings were   applied.  Patient was placed in a splint and returned to the recovery room in   stable condition, sustaining no complications.    POSTOPERATIVE PLAN:  1.  Toe touch weightbearing x6 weeks.  Ankle range of motion to begin after   postoperative visit if wound is in good condition.  2.  DVT prophylaxis with SCDs and aspirin given her elevated risk for DVT   given her cancer diagnosis.  3.  Discharge home when criteria met.  Follow up with [facility]   in 2 weeks.  4.  Patient will be dismissed home with a narcotic pain prescription.  I   discussed risks and benefits of this with her and she indicated understanding.    Additionally, for all patient encounter, for the consent and for the narcotic   consent, the services of a  were employed.       ____________________________________     MD NADIRA Tran / CHERRIE    DD:  08/27/2020 11:20:55  DT:  08/27/2020 12:50:19    D#:  [id number]  Job#:  [id number]

## 2020-08-27 NOTE — PROGRESS NOTES
Pt arrived to PACU II at 1305. Pt aa/ox4, VSS. Discharge criteria met. Pain is 3/10 which patient states is tolerable. Left leg with splint cast dressing clean, dry, and intact. Pt changed into clothing with assistance. Discharge instructions given; pt and family verbalized understanding and questions answered.  IV removed, tip intact. Will follow-up with Dr. Navarro in 1-2 weeks. Pt drinking water, juice and eating crackers. Waiting for pt's  to pick pt up. Call -light within reach.

## 2020-08-27 NOTE — ANESTHESIA POSTPROCEDURE EVALUATION
Patient: Zahida Rodriguez    Procedure Summary     Date: 08/27/20 Room / Location: [place] [address] / SURGERY [facility]    Anesthesia Start: 0921 Anesthesia Stop: 1105    Procedure: ORIF, ANKLE (Left Ankle) Diagnosis: (Left distal fibula fracture)    Surgeon: Efren Navarro M.D. Responsible Provider: Leonard Taylor M.D.    Anesthesia Type: general, peripheral nerve block ASA Status: 2          Final Anesthesia Type: general, peripheral nerve block  Last vitals  BP   Blood Pressure: 126/70    Temp   36.3 °C (97.4 °F)    Pulse   Pulse: 98   Resp   18    SpO2   95 %      Anesthesia Post Evaluation    Patient location during evaluation: PACU  Patient participation: complete - patient participated  Level of consciousness: awake and alert  Pain score: 3    Airway patency: patent  Anesthetic complications: no  Cardiovascular status: hemodynamically stable  Respiratory status: acceptable  Hydration status: euvolemic    PONV: none           Nurse Pain Score: 3 (NPRS)

## 2020-08-27 NOTE — ANESTHESIA QCDR
2019 [facility] Clinical Data Registry (for Quality Improvement)     Postoperative nausea/vomiting risk protocol (Adult = 18 yrs and Pediatric 3-17 yrs)- (430 and 463)  General inhalation anesthetic (NOT TIVA) with PONV risk factors: No  Provision of anti-emetic therapy with at least 2 different classes of agents: N/A  Patient DID NOT receive anti-emetic therapy and reason is documented in Medical Record: N/A    Multimodal Pain Management- (477)  Non-emergent surgery AND patient age >= 18: Yes  Use of Multimodal Pain Management, two or more drugs and/or interventions, NOT including systemic opioids: Yes  Exception: Documented allergy to multiple classes of analgesics: N/A    Smoking Abstinence (404)  Patient is current smoker (cigarette, pipe, e-cig, marijuanna): No  Elective Surgery:   Abstinence instructions provided prior to day of surgery:   Patient abstained from smoking on day of surgery:     Pre-Op Beta-Blocker in Isolated CABG (44)  Isolated CABG AND patient age >= 18: No  Beta-blocker admin within 24 hours of surgical incision:   Exception:of medical reason(s) for not administering beta blocker within 24 hours prior to surgical incision (e.g., not  indicated,other medical reason):     PACU assessment of acute postoperative pain prior to Anesthesia Care End- Applies to Patients Age = 18- (ABG7)  Initial PACU pain score is which of the following: < 7/10  Patient unable to report pain score: N/A    Post-anesthetic transfer of care checklist/protocol to PACU/ICU- (426 and 427)  Upon conclusion of case, patient transferred to which of the following locations: PACU/Non-ICU  Use of transfer checklist/protocol: Yes  Exclusion: Service Performed in Patient Hospital Room (and thus did not require transfer): N/A  Unplanned admission to ICU related to anesthesia service up through end of PACU care- (MD51)  Unplanned admission to ICU (not initially anticipated at anesthesia start time): No

## 2020-08-27 NOTE — OR NURSING
11:04  Patient arrived from the OR to PACU 15B.  Bedside report received.  Oral airway in place.  VSS.  Will continue to monitor for pain/nausea.

## 2020-09-17 ENCOUNTER — OUTPATIENT INFUSION SERVICES (OUTPATIENT)
Dept: ONCOLOGY | Facility: MEDICAL CENTER | Age: 50
End: 2020-09-17
Attending: INTERNAL MEDICINE
Payer: COMMERCIAL

## 2020-09-17 VITALS
HEIGHT: 60 IN | BODY MASS INDEX: 45.01 KG/M2 | WEIGHT: 229.28 LBS | OXYGEN SATURATION: 94 % | TEMPERATURE: 97.9 F | HEART RATE: 89 BPM | SYSTOLIC BLOOD PRESSURE: 150 MMHG | RESPIRATION RATE: 18 BRPM | DIASTOLIC BLOOD PRESSURE: 72 MMHG

## 2020-09-17 DIAGNOSIS — C50.411 MALIGNANT NEOPLASM OF UPPER-OUTER QUADRANT OF RIGHT BREAST IN FEMALE, ESTROGEN RECEPTOR POSITIVE (HCC): ICD-10-CM

## 2020-09-17 DIAGNOSIS — Z17.0 MALIGNANT NEOPLASM OF UPPER-OUTER QUADRANT OF RIGHT BREAST IN FEMALE, ESTROGEN RECEPTOR POSITIVE (HCC): ICD-10-CM

## 2020-09-17 LAB
ALBUMIN SERPL BCP-MCNC: 4.1 G/DL (ref 3.2–4.9)
ALBUMIN/GLOB SERPL: 1.2 G/DL
ALP SERPL-CCNC: 90 U/L (ref 30–99)
ALT SERPL-CCNC: 37 U/L (ref 2–50)
ANION GAP SERPL CALC-SCNC: 17 MMOL/L (ref 7–16)
AST SERPL-CCNC: 27 U/L (ref 12–45)
BASOPHILS # BLD AUTO: 0.1 % (ref 0–1.8)
BASOPHILS # BLD: 0.01 K/UL (ref 0–0.12)
BILIRUB SERPL-MCNC: 0.3 MG/DL (ref 0.1–1.5)
BUN SERPL-MCNC: 12 MG/DL (ref 8–22)
CALCIUM SERPL-MCNC: 9.6 MG/DL (ref 8.5–10.5)
CHLORIDE SERPL-SCNC: 98 MMOL/L (ref 96–112)
CO2 SERPL-SCNC: 22 MMOL/L (ref 20–33)
CREAT SERPL-MCNC: 0.41 MG/DL (ref 0.5–1.4)
EOSINOPHIL # BLD AUTO: 0 K/UL (ref 0–0.51)
EOSINOPHIL NFR BLD: 0 % (ref 0–6.9)
ERYTHROCYTE [DISTWIDTH] IN BLOOD BY AUTOMATED COUNT: 49.3 FL (ref 35.9–50)
GLOBULIN SER CALC-MCNC: 3.4 G/DL (ref 1.9–3.5)
GLUCOSE SERPL-MCNC: 242 MG/DL (ref 65–99)
HCT VFR BLD AUTO: 40 % (ref 37–47)
HGB BLD-MCNC: 13 G/DL (ref 12–16)
IMM GRANULOCYTES # BLD AUTO: 0.04 K/UL (ref 0–0.11)
IMM GRANULOCYTES NFR BLD AUTO: 0.5 % (ref 0–0.9)
LYMPHOCYTES # BLD AUTO: 1.7 K/UL (ref 1–4.8)
LYMPHOCYTES NFR BLD: 19.8 % (ref 22–41)
MCH RBC QN AUTO: 27.9 PG (ref 27–33)
MCHC RBC AUTO-ENTMCNC: 32.5 G/DL (ref 33.6–35)
MCV RBC AUTO: 85.8 FL (ref 81.4–97.8)
MONOCYTES # BLD AUTO: 0.09 K/UL (ref 0–0.85)
MONOCYTES NFR BLD AUTO: 1.1 % (ref 0–13.4)
NEUTROPHILS # BLD AUTO: 6.73 K/UL (ref 2–7.15)
NEUTROPHILS NFR BLD: 78.5 % (ref 44–72)
NRBC # BLD AUTO: 0 K/UL
NRBC BLD-RTO: 0 /100 WBC
PLATELET # BLD AUTO: 386 K/UL (ref 164–446)
PMV BLD AUTO: 9.9 FL (ref 9–12.9)
POTASSIUM SERPL-SCNC: 3.7 MMOL/L (ref 3.6–5.5)
PROT SERPL-MCNC: 7.5 G/DL (ref 6–8.2)
RBC # BLD AUTO: 4.66 M/UL (ref 4.2–5.4)
SODIUM SERPL-SCNC: 137 MMOL/L (ref 135–145)
WBC # BLD AUTO: 8.6 K/UL (ref 4.8–10.8)

## 2020-09-17 PROCEDURE — A4212 NON CORING NEEDLE OR STYLET: HCPCS

## 2020-09-17 PROCEDURE — 80053 COMPREHEN METABOLIC PANEL: CPT

## 2020-09-17 PROCEDURE — 304540 HCHG NITRO SET VENT 2ND TUB

## 2020-09-17 PROCEDURE — 85025 COMPLETE CBC W/AUTO DIFF WBC: CPT

## 2020-09-17 PROCEDURE — 96413 CHEMO IV INFUSION 1 HR: CPT

## 2020-09-17 PROCEDURE — 700105 HCHG RX REV CODE 258: Performed by: INTERNAL MEDICINE

## 2020-09-17 PROCEDURE — 96375 TX/PRO/DX INJ NEW DRUG ADDON: CPT

## 2020-09-17 PROCEDURE — 700111 HCHG RX REV CODE 636 W/ 250 OVERRIDE (IP): Performed by: INTERNAL MEDICINE

## 2020-09-17 PROCEDURE — 700111 HCHG RX REV CODE 636 W/ 250 OVERRIDE (IP)

## 2020-09-17 PROCEDURE — 96417 CHEMO IV INFUS EACH ADDL SEQ: CPT

## 2020-09-17 RX ORDER — HEPARIN SODIUM (PORCINE) LOCK FLUSH IV SOLN 100 UNIT/ML 100 UNIT/ML
SOLUTION INTRAVENOUS
Status: COMPLETED
Start: 2020-09-17 | End: 2020-09-17

## 2020-09-17 RX ORDER — OXYCODONE HYDROCHLORIDE 5 MG/1
5 TABLET ORAL EVERY 4 HOURS PRN
COMMUNITY

## 2020-09-17 RX ADMIN — CYCLOPHOSPHAMIDE 1260 MG: 1 INJECTION, POWDER, FOR SOLUTION INTRAVENOUS; ORAL at 12:39

## 2020-09-17 RX ADMIN — HEPARIN 500 UNITS: 100 SYRINGE at 13:57

## 2020-09-17 RX ADMIN — DOCETAXEL 157.6 MG: 20 INJECTION, SOLUTION, CONCENTRATE INTRAVENOUS at 11:17

## 2020-09-17 RX ADMIN — DEXAMETHASONE SODIUM PHOSPHATE 12 MG: 4 INJECTION, SOLUTION INTRA-ARTICULAR; INTRALESIONAL; INTRAMUSCULAR; INTRAVENOUS; SOFT TISSUE at 10:09

## 2020-09-17 RX ADMIN — ONDANSETRON HYDROCHLORIDE 16 MG: 2 SOLUTION INTRAMUSCULAR; INTRAVENOUS at 10:09

## 2020-09-17 ASSESSMENT — FIBROSIS 4 INDEX: FIB4 SCORE: 2.18

## 2020-09-17 NOTE — PROGRESS NOTES
Chemotherapy Verification - SECONDARY RN       Height = 153 cm  Weight = 104 kg  BSA = 2.1 m2       Medication: Taxotere  Dose: 75 mg/m2  Calculated Dose: 157.5 mg                             (In mg/m2, AUC, mg/kg)     Medication: Cytoxan  Dose: 600 mg/m2  Calculated Dose: 1,260 mg                             (In mg/m2, AUC, mg/kg)      I confirm that this process was performed independently.

## 2020-09-17 NOTE — PROGRESS NOTES
Chemotherapy Verification - PRIMARY RN      Height = 153cm  Weight = 104kg  BSA = 2.1m2       Medication: docetaxol  Dose: 75mg/m2  Calculated Dose: 157.5mg                             (In mg/m2, AUC, mg/kg)     Medication: Cyclophosphamide  Dose: 600mg/m2  Calculated Dose: 1260mg                             (In mg/m2, AUC, mg/kg)        I confirm this process was performed independently with the BSA and all final chemotherapy dosing calculations congruent.  Any discrepancies of 10% or greater have been addressed with the chemotherapy pharmacist. The resolution of the discrepancy has been documented in the EPIC progress notes.

## 2020-09-17 NOTE — PROGRESS NOTES
"Pharmacy Chemotherapy calculation:  Patient Name: Zahida Davis   Dx: recurrent breast cancer: invasive ductal carcinoma, grade 2  > right breast: ER 80%, NM 60%; Ki-67 10%; HER-2/ruel FISH negative  Protocol: OP Breast Docetaxel + Cyclophosphamide     Regimen: docetaxel (Taxotere) + cyclophosphamide (Cytoxan)  Docetaxel (Taxotere) 75 mg/m2 IV over 60 min D1  Cyclophosphamide (Cytoxan) 600 mg/m2 IV over 30 min D1   Q21Day x 4 cycles      *Dosing Reference*  1. Oral BOND et al. Docetaxel With Cyclophosphamide Is Associated With an Overall Survival Benefit Compared With Doxorubicin and Cyclophosphamide: 7-Year Follow-Up of [place] Oncology Research Trial 9735. J Clin Oncol 2009;27:6958-5847.  2. NCCN Guidelines v4.2020 > Invasive Breast Cancer > HER2-Negative - Preferred Regimens  BINV-L, 3 of 6    Allergies:  Patient has no known allergies.     /72   Pulse 89   Temp 36.6 °C (97.9 °F) (Temporal)   Resp 18   Ht 1.53 m (5' 0.24\") Comment: Pt has cast on foot unable to stand completely straight  Wt 104 kg (229 lb 4.5 oz)   SpO2 94%   BMI 44.43 kg/m²  Body surface area is 2.1 meters squared.    Labs: 9/17/20  Meets treatment parameters  SCr = 0.41 mg/dL, estCrCl > 60 mL/min    Drug Order   (Drug name, dose, route, IV Fluid & volume, frequency, number of doses) Cycle 4     Previous treatment: 7/9/20      Medication = Docetaxel (Taxotere)  Base Dose= 75 mg/m2  Calc Dose: Base Dose x 2.1 m2 = 157.6 mg  Final Dose = 157.6 mg  Final [conc] ~ 0.61 mg/mL  Route = IV  Fluid & Volume =  mL  Admin Duration = Over 60 minutes            <10% difference, okay to treat with final dose   Medication = Cyclophosphamide (Cytoxan)  Base Dose= 600 mg/m2  Calc Dose: Base Dose x 2.1 m2 = 1260 mg  Final Dose = 1260 mg  Route = IV  Fluid & Volume =  mL  Admin Duration = Over 30 minutes            <10% difference, okay to treat with final dose       "

## 2020-09-17 NOTE — PROGRESS NOTES
Zahida received her C4D1 docetaxol/cyclophsphamide today without issue. Premedicated with IV dex and zofran. Blood return noted in L chest port before and after infusion. Patient discharges stable and ambulatory.

## 2020-09-17 NOTE — PROGRESS NOTES
"Pharmacy Chemotherapy Calculations Note:    Patient Name: Zahida Rodriguez        Dx: Stage IIIA Breast CA (HER2-, ER/NE+)   Cycle:  4 (held 2/2 patient being COVID +)  Previous treatment: C3 = 7/9/2020    Protocol: TC  DOCEtaxel 75 mg/m² IV over 60 minutes on Day 1  Cylcophosphamide 600 mg/m²  IV over 30 minutes on Day 1  Every 21 days for 4 cycles             /72   Pulse 89   Temp 36.6 °C (97.9 °F) (Temporal)   Resp 18   Ht 1.53 m (5' 0.24\") Comment: Pt has cast on foot unable to stand completely straight  Wt 104 kg (229 lb 4.5 oz)   SpO2 94%   BMI 44.43 kg/m²  Body surface area is 2.1 meters squared.  ANC~ 6730  Plt = 386 k    SCr = 0.41 mg/dL CrCl = >125 mL/min  LFT's = WNL  TBili = 0.3     DOCEtaxel (Taxotere) 75 mg/m² x 2.1 m² = 157.5 mg   <10% difference, ok to treat with final written dose = 157.6 mg    Cyclophosphamide (Cytoxan) 600 mg/m² x 2.1 m² = 1260 mg    <10% difference, ok to treat with final written dose = 1260 mg    Deondre Hicks, PharmD, PharmD, BCPS             "

## 2020-09-18 DIAGNOSIS — Z17.0 MALIGNANT NEOPLASM OF UPPER-OUTER QUADRANT OF RIGHT BREAST IN FEMALE, ESTROGEN RECEPTOR POSITIVE (HCC): ICD-10-CM

## 2020-09-18 DIAGNOSIS — C50.411 MALIGNANT NEOPLASM OF UPPER-OUTER QUADRANT OF RIGHT BREAST IN FEMALE, ESTROGEN RECEPTOR POSITIVE (HCC): ICD-10-CM

## 2020-10-01 ENCOUNTER — HOSPITAL ENCOUNTER (OUTPATIENT)
Dept: RADIATION ONCOLOGY | Facility: MEDICAL CENTER | Age: 50
End: 2020-10-31
Attending: RADIOLOGY
Payer: COMMERCIAL

## 2020-10-08 ENCOUNTER — HOSPITAL ENCOUNTER (OUTPATIENT)
Dept: RADIATION ONCOLOGY | Facility: MEDICAL CENTER | Age: 50
End: 2020-10-08
Payer: MEDICAID

## 2020-10-08 PROCEDURE — 77290 THER RAD SIMULAJ FIELD CPLX: CPT | Mod: 26 | Performed by: RADIOLOGY

## 2020-10-08 PROCEDURE — 77334 RADIATION TREATMENT AID(S): CPT | Performed by: RADIOLOGY

## 2020-10-08 PROCEDURE — 77334 RADIATION TREATMENT AID(S): CPT | Mod: 26 | Performed by: RADIOLOGY

## 2020-10-08 PROCEDURE — 77290 THER RAD SIMULAJ FIELD CPLX: CPT | Performed by: RADIOLOGY

## 2020-10-08 PROCEDURE — 77470 SPECIAL RADIATION TREATMENT: CPT | Performed by: RADIOLOGY

## 2020-10-08 PROCEDURE — 77263 THER RADIOLOGY TX PLNG CPLX: CPT | Performed by: RADIOLOGY

## 2020-10-16 LAB
CHEMOTHERAPY INFUSION START DATE: NORMAL
CHEMOTHERAPY INFUSION STOP DATE: NORMAL
CHEMOTHERAPY RECORDS: 0.29
CHEMOTHERAPY RECORDS: 1.8
CHEMOTHERAPY RECORDS: 1.8
CHEMOTHERAPY RECORDS: 5040
CHEMOTHERAPY RECORDS: 5040
CHEMOTHERAPY RECORDS: 813
CHEMOTHERAPY RECORDS: NORMAL
RAD ONC ARIA COURSE TREATMENT ELAPSED DAYS: NORMAL
RAD ONC ARIA REFERENCE POINT DOSAGE GIVEN TO DATE: 0
RAD ONC ARIA REFERENCE POINT ID: NORMAL

## 2020-10-16 PROCEDURE — 77300 RADIATION THERAPY DOSE PLAN: CPT | Mod: 26 | Performed by: RADIOLOGY

## 2020-10-16 PROCEDURE — 77334 RADIATION TREATMENT AID(S): CPT | Mod: 26 | Performed by: RADIOLOGY

## 2020-10-16 PROCEDURE — 77295 3-D RADIOTHERAPY PLAN: CPT | Mod: 26 | Performed by: RADIOLOGY

## 2020-10-16 PROCEDURE — 77295 3-D RADIOTHERAPY PLAN: CPT | Performed by: RADIOLOGY

## 2020-10-16 PROCEDURE — 77300 RADIATION THERAPY DOSE PLAN: CPT | Performed by: RADIOLOGY

## 2020-10-16 PROCEDURE — 77334 RADIATION TREATMENT AID(S): CPT | Performed by: RADIOLOGY

## 2020-10-19 ENCOUNTER — HOSPITAL ENCOUNTER (OUTPATIENT)
Dept: RADIATION ONCOLOGY | Facility: MEDICAL CENTER | Age: 50
End: 2020-10-19
Payer: MEDICAID

## 2020-10-19 LAB
CHEMOTHERAPY INFUSION START DATE: NORMAL
CHEMOTHERAPY RECORDS: 0.32
CHEMOTHERAPY RECORDS: 1.8
CHEMOTHERAPY RECORDS: 1.8
CHEMOTHERAPY RECORDS: 5040
CHEMOTHERAPY RECORDS: 5040
CHEMOTHERAPY RECORDS: 896
CHEMOTHERAPY RECORDS: NORMAL
CHEMOTHERAPY RX CANCER: NORMAL
DATE 1ST CHEMO CANCER: NORMAL
RAD ONC ARIA COURSE LAST TREATMENT DATE: NORMAL
RAD ONC ARIA COURSE TREATMENT ELAPSED DAYS: NORMAL
RAD ONC ARIA REFERENCE POINT DOSAGE GIVEN TO DATE: 0.29
RAD ONC ARIA REFERENCE POINT DOSAGE GIVEN TO DATE: 0.32
RAD ONC ARIA REFERENCE POINT DOSAGE GIVEN TO DATE: 1.8
RAD ONC ARIA REFERENCE POINT ID: NORMAL
RAD ONC ARIA REFERENCE POINT SESSION DOSAGE GIVEN: 0.29
RAD ONC ARIA REFERENCE POINT SESSION DOSAGE GIVEN: 0.32
RAD ONC ARIA REFERENCE POINT SESSION DOSAGE GIVEN: 1.8

## 2020-10-19 PROCEDURE — 77412 RADIATION TX DELIVERY LVL 3: CPT | Performed by: RADIOLOGY

## 2020-10-19 PROCEDURE — 77280 THER RAD SIMULAJ FIELD SMPL: CPT | Mod: 26 | Performed by: RADIOLOGY

## 2020-10-19 PROCEDURE — 77280 THER RAD SIMULAJ FIELD SMPL: CPT | Performed by: RADIOLOGY

## 2020-10-20 ENCOUNTER — HOSPITAL ENCOUNTER (OUTPATIENT)
Dept: RADIATION ONCOLOGY | Facility: MEDICAL CENTER | Age: 50
End: 2020-10-20
Payer: MEDICAID

## 2020-10-20 LAB
CHEMOTHERAPY INFUSION START DATE: NORMAL
CHEMOTHERAPY RECORDS: 0.32
CHEMOTHERAPY RECORDS: 1.8
CHEMOTHERAPY RECORDS: 1.8
CHEMOTHERAPY RECORDS: 5040
CHEMOTHERAPY RECORDS: 5040
CHEMOTHERAPY RECORDS: 896
CHEMOTHERAPY RECORDS: NORMAL
CHEMOTHERAPY RX CANCER: NORMAL
DATE 1ST CHEMO CANCER: NORMAL
RAD ONC ARIA COURSE LAST TREATMENT DATE: NORMAL
RAD ONC ARIA COURSE TREATMENT ELAPSED DAYS: NORMAL
RAD ONC ARIA REFERENCE POINT DOSAGE GIVEN TO DATE: 0.58
RAD ONC ARIA REFERENCE POINT DOSAGE GIVEN TO DATE: 0.64
RAD ONC ARIA REFERENCE POINT DOSAGE GIVEN TO DATE: 3.6
RAD ONC ARIA REFERENCE POINT ID: NORMAL
RAD ONC ARIA REFERENCE POINT SESSION DOSAGE GIVEN: 0.29
RAD ONC ARIA REFERENCE POINT SESSION DOSAGE GIVEN: 0.32
RAD ONC ARIA REFERENCE POINT SESSION DOSAGE GIVEN: 1.8

## 2020-10-20 PROCEDURE — 77412 RADIATION TX DELIVERY LVL 3: CPT | Performed by: RADIOLOGY

## 2020-10-20 PROCEDURE — 77417 THER RADIOLOGY PORT IMAGE(S): CPT | Performed by: RADIOLOGY

## 2020-10-21 ENCOUNTER — HOSPITAL ENCOUNTER (OUTPATIENT)
Dept: RADIATION ONCOLOGY | Facility: MEDICAL CENTER | Age: 50
End: 2020-10-21
Payer: MEDICAID

## 2020-10-21 VITALS
DIASTOLIC BLOOD PRESSURE: 78 MMHG | SYSTOLIC BLOOD PRESSURE: 113 MMHG | HEART RATE: 83 BPM | OXYGEN SATURATION: 94 % | TEMPERATURE: 98.7 F

## 2020-10-21 LAB
CHEMOTHERAPY INFUSION START DATE: NORMAL
CHEMOTHERAPY RECORDS: 0.32
CHEMOTHERAPY RECORDS: 1.8
CHEMOTHERAPY RECORDS: 1.8
CHEMOTHERAPY RECORDS: 5040
CHEMOTHERAPY RECORDS: 5040
CHEMOTHERAPY RECORDS: 896
CHEMOTHERAPY RECORDS: NORMAL
CHEMOTHERAPY RX CANCER: NORMAL
DATE 1ST CHEMO CANCER: NORMAL
RAD ONC ARIA COURSE LAST TREATMENT DATE: NORMAL
RAD ONC ARIA COURSE TREATMENT ELAPSED DAYS: NORMAL
RAD ONC ARIA REFERENCE POINT DOSAGE GIVEN TO DATE: 0.87
RAD ONC ARIA REFERENCE POINT DOSAGE GIVEN TO DATE: 0.96
RAD ONC ARIA REFERENCE POINT DOSAGE GIVEN TO DATE: 5.4
RAD ONC ARIA REFERENCE POINT ID: NORMAL
RAD ONC ARIA REFERENCE POINT SESSION DOSAGE GIVEN: 0.29
RAD ONC ARIA REFERENCE POINT SESSION DOSAGE GIVEN: 0.32
RAD ONC ARIA REFERENCE POINT SESSION DOSAGE GIVEN: 1.8

## 2020-10-21 PROCEDURE — 77417 THER RADIOLOGY PORT IMAGE(S): CPT | Performed by: RADIOLOGY

## 2020-10-21 PROCEDURE — 77412 RADIATION TX DELIVERY LVL 3: CPT | Performed by: RADIOLOGY

## 2020-10-21 PROCEDURE — 77336 RADIATION PHYSICS CONSULT: CPT | Performed by: RADIOLOGY

## 2020-10-21 ASSESSMENT — PAIN SCALES - GENERAL: PAINLEVEL: NO PAIN

## 2020-10-21 NOTE — ON TREATMENT VISIT
ON TREATMENT NOTE  RADIATION ONCOLOGY DEPARTMENT    Patient name:  Zahida Rodriguez    Primary Physician:  AKBAR Henry MRN: [id number]  CSN: [id number]   Referring physician:  Codie Carreno M.D. : 1970, 50 y.o.     ENCOUNTER DATE:  10/21/20    DIAGNOSIS:    Malignant neoplasm of upper-outer quadrant of right breast in female, estrogen receptor positive (HCC)  Staging form: Breast, AJCC 8th Edition  - Pathologic: Stage IIIA (pT2, pN3a, cM0, G2, ER+, GA+, HER2-) - Signed by Amy Kahn M.D. on 2020  Stage prefix: Initial diagnosis  Method of lymph node assessment: Axillary lymph node dissection  Multigene prognostic tests performed: None  Histologic grading system: 3 grade system  Laterality: Right      TREATMENT SUMMARY:  Radiation Treatments     Active   Plans   R CW   Most recent treatment: Dose planned: 180 cGy (fraction 3 of 28 on 10/21/2020)   Total: Dose planned: 5,040 cGy   Elapsed Days: 2 @ [id number]      R PAB   Most recent treatment: Dose planned: 32 cGy (fraction 3 of 28 on 10/21/2020)   Total: Dose planned: 896 cGy   Elapsed Days: 2 @ [id number]      R SCV   Most recent treatment: Dose planned: 180 cGy (fraction 3 of 28 on 10/21/2020)   Total: Dose planned: 5,040 cGy   Elapsed Days: 2 @ [id number]      R CW   Most recent treatment: Dose planned: 180 cGy (fraction 0 of 28 on 10/16/2020)   Total: Dose planned: 5,040 cGy   Elapsed Days:  @       R PAB   Most recent treatment: Dose planned: 29 cGy (fraction 0 of 28 on 10/16/2020)   Total: Dose planned: 813 cGy   Elapsed Days:  @       R SCV   Most recent treatment: Dose planned: 180 cGy (fraction 0 of 28 on 10/16/2020)   Total: Dose planned: 5,040 cGy   Elapsed Days:  @       Reference Points   R CW   Most recent treatment: Dose given: 180 cGy (on 10/21/2020)   Total: Dose given: 540 cGy   Elapsed Days: 2 @ [id number]      R CW CP   Most recent treatment: Dose given: 180 cGy (on 10/21/2020)   Total:  Dose given: 540 cGy   Elapsed Days: 2 @ [id number]      R SCV   Most recent treatment: Dose given: 180 cGy (on 10/21/2020)   Total: Dose given: 540 cGy   Elapsed Days: 2 @ [id number]      R SCV CP   Most recent treatment: Dose given: 180 cGy (on 10/21/2020)   Total: Dose given: 540 cGy   Elapsed Days: 2 @ [id number]      Rt PAB   Most recent treatment: Dose given: 32 cGy (on 10/21/2020)   Total: Dose given: 96 cGy   Elapsed Days: 2 @ [id number]      Rt PAB CP   Most recent treatment: Dose given: 29 cGy (on 10/21/2020)   Total: Dose given: 87 cGy   Elapsed Days: 2 @ [id number]      R CW   Most recent treatment: Dose given: - (on 10/16/2020)   Total: Dose given: 0 cGy   Elapsed Days:  @       R CW CP   Most recent treatment: Dose given: - (on 10/16/2020)   Total: Dose given: 0 cGy   Elapsed Days:  @       R SCV   Most recent treatment: Dose given: - (on 10/16/2020)   Total: Dose given: 0 cGy   Elapsed Days:  @       R SCV CP   Most recent treatment: Dose given: - (on 10/16/2020)   Total: Dose given: 0 cGy   Elapsed Days:  @       Rt PAB   Most recent treatment: Dose given: - (on 10/16/2020)   Total: Dose given: 0 cGy   Elapsed Days:  @       Rt PAB CP   Most recent treatment: Dose given: - (on 10/16/2020)   Total: Dose given: 0 cGy   Elapsed Days:  @                     SUBJECTIVE:   Doing well no complaints      VITAL SIGNS:  /78 (BP Location: Left arm, Patient Position: Sitting, BP Cuff Size: Adult)   Pulse 83   Temp 37.1 °C (98.7 °F) (Temporal)   SpO2 94%    Encounter Vitals  Temperature: 37.1 °C (98.7 °F)  Temp src: Temporal  Blood Pressure: 113/78  Pulse: 83  Pulse Oximetry: 94 %  Pain Score: No pain  Pain Assessment 10/21/2020 7/9/2020 4/24/2020   Pain Assessment Denies Pain - -   Pain Score 0 7 7   Some recent data might be hidden          PHYSICAL EXAM:    No erythema    Toxicity Assessment 10/21/2020   Toxicity Assessment Breast   Fatigue (lethargy, malaise, asthenia) None   Fever (in the  absence of neutropenia) None   Radiation Dermatitis None   Lymphatics Normal   RT - Pain due to RT None   Dyspnea Normal         IMPRESSION:  Cancer Staging  Malignant neoplasm of upper-outer quadrant of right breast in female, estrogen receptor positive (HCC)  Staging form: Breast, AJCC 8th Edition  - Pathologic: Stage IIIA (pT2, pN3a, cM0, G2, ER+, [place]+, HER2-) - Signed by Amy Kahn M.D. on 4/21/2020      PLAN:  No change in treatment plan    Disposition:  Treatment plan reviewed. Questions answered. Continue therapy outlined.     Amy Kahn M.D.    No orders of the defined types were placed in this encounter.

## 2020-10-22 ENCOUNTER — HOSPITAL ENCOUNTER (OUTPATIENT)
Dept: RADIATION ONCOLOGY | Facility: MEDICAL CENTER | Age: 50
End: 2020-10-22
Payer: MEDICAID

## 2020-10-22 LAB
CHEMOTHERAPY INFUSION START DATE: NORMAL
CHEMOTHERAPY RECORDS: 0.32
CHEMOTHERAPY RECORDS: 1.8
CHEMOTHERAPY RECORDS: 1.8
CHEMOTHERAPY RECORDS: 5040
CHEMOTHERAPY RECORDS: 5040
CHEMOTHERAPY RECORDS: 896
CHEMOTHERAPY RECORDS: NORMAL
CHEMOTHERAPY RX CANCER: NORMAL
DATE 1ST CHEMO CANCER: NORMAL
RAD ONC ARIA COURSE LAST TREATMENT DATE: NORMAL
RAD ONC ARIA COURSE TREATMENT ELAPSED DAYS: NORMAL
RAD ONC ARIA REFERENCE POINT DOSAGE GIVEN TO DATE: 1.16
RAD ONC ARIA REFERENCE POINT DOSAGE GIVEN TO DATE: 1.28
RAD ONC ARIA REFERENCE POINT DOSAGE GIVEN TO DATE: 7.2
RAD ONC ARIA REFERENCE POINT ID: NORMAL
RAD ONC ARIA REFERENCE POINT SESSION DOSAGE GIVEN: 0.29
RAD ONC ARIA REFERENCE POINT SESSION DOSAGE GIVEN: 0.32
RAD ONC ARIA REFERENCE POINT SESSION DOSAGE GIVEN: 1.8

## 2020-10-22 PROCEDURE — 77412 RADIATION TX DELIVERY LVL 3: CPT | Performed by: RADIOLOGY

## 2020-10-22 PROCEDURE — 77417 THER RADIOLOGY PORT IMAGE(S): CPT | Performed by: RADIOLOGY

## 2020-10-23 ENCOUNTER — HOSPITAL ENCOUNTER (OUTPATIENT)
Dept: RADIATION ONCOLOGY | Facility: MEDICAL CENTER | Age: 50
End: 2020-10-23
Payer: MEDICAID

## 2020-10-23 LAB
CHEMOTHERAPY INFUSION START DATE: NORMAL
CHEMOTHERAPY RECORDS: 0.32
CHEMOTHERAPY RECORDS: 1.8
CHEMOTHERAPY RECORDS: 1.8
CHEMOTHERAPY RECORDS: 5040
CHEMOTHERAPY RECORDS: 5040
CHEMOTHERAPY RECORDS: 896
CHEMOTHERAPY RECORDS: NORMAL
CHEMOTHERAPY RX CANCER: NORMAL
DATE 1ST CHEMO CANCER: NORMAL
RAD ONC ARIA COURSE LAST TREATMENT DATE: NORMAL
RAD ONC ARIA COURSE TREATMENT ELAPSED DAYS: NORMAL
RAD ONC ARIA REFERENCE POINT DOSAGE GIVEN TO DATE: 1.45
RAD ONC ARIA REFERENCE POINT DOSAGE GIVEN TO DATE: 1.6
RAD ONC ARIA REFERENCE POINT DOSAGE GIVEN TO DATE: 9
RAD ONC ARIA REFERENCE POINT ID: NORMAL
RAD ONC ARIA REFERENCE POINT SESSION DOSAGE GIVEN: 0.29
RAD ONC ARIA REFERENCE POINT SESSION DOSAGE GIVEN: 0.32
RAD ONC ARIA REFERENCE POINT SESSION DOSAGE GIVEN: 1.8

## 2020-10-23 PROCEDURE — 77427 RADIATION TX MANAGEMENT X5: CPT | Performed by: RADIOLOGY

## 2020-10-23 PROCEDURE — 77417 THER RADIOLOGY PORT IMAGE(S): CPT | Performed by: RADIOLOGY

## 2020-10-23 PROCEDURE — 77412 RADIATION TX DELIVERY LVL 3: CPT | Performed by: RADIOLOGY

## 2020-10-26 ENCOUNTER — HOSPITAL ENCOUNTER (OUTPATIENT)
Dept: RADIATION ONCOLOGY | Facility: MEDICAL CENTER | Age: 50
End: 2020-10-26
Payer: MEDICAID

## 2020-10-26 LAB
CHEMOTHERAPY INFUSION START DATE: NORMAL
CHEMOTHERAPY RECORDS: 0.32
CHEMOTHERAPY RECORDS: 1.8
CHEMOTHERAPY RECORDS: 1.8
CHEMOTHERAPY RECORDS: 5040
CHEMOTHERAPY RECORDS: 5040
CHEMOTHERAPY RECORDS: 896
CHEMOTHERAPY RECORDS: NORMAL
CHEMOTHERAPY RX CANCER: NORMAL
DATE 1ST CHEMO CANCER: NORMAL
RAD ONC ARIA COURSE LAST TREATMENT DATE: NORMAL
RAD ONC ARIA COURSE TREATMENT ELAPSED DAYS: NORMAL
RAD ONC ARIA REFERENCE POINT DOSAGE GIVEN TO DATE: 1.74
RAD ONC ARIA REFERENCE POINT DOSAGE GIVEN TO DATE: 1.92
RAD ONC ARIA REFERENCE POINT DOSAGE GIVEN TO DATE: 10.8
RAD ONC ARIA REFERENCE POINT ID: NORMAL
RAD ONC ARIA REFERENCE POINT SESSION DOSAGE GIVEN: 0.29
RAD ONC ARIA REFERENCE POINT SESSION DOSAGE GIVEN: 0.32
RAD ONC ARIA REFERENCE POINT SESSION DOSAGE GIVEN: 1.8

## 2020-10-26 PROCEDURE — 77417 THER RADIOLOGY PORT IMAGE(S): CPT | Performed by: RADIOLOGY

## 2020-10-26 PROCEDURE — 77412 RADIATION TX DELIVERY LVL 3: CPT | Performed by: RADIOLOGY

## 2020-10-27 ENCOUNTER — HOSPITAL ENCOUNTER (OUTPATIENT)
Dept: RADIATION ONCOLOGY | Facility: MEDICAL CENTER | Age: 50
End: 2020-10-27
Payer: MEDICAID

## 2020-10-27 ENCOUNTER — HOSPITAL ENCOUNTER (OUTPATIENT)
Dept: RADIOLOGY | Facility: MEDICAL CENTER | Age: 50
End: 2020-10-27
Attending: INTERNAL MEDICINE
Payer: COMMERCIAL

## 2020-10-27 DIAGNOSIS — C50.411 MALIGNANT NEOPLASM OF UPPER-OUTER QUADRANT OF RIGHT FEMALE BREAST, UNSPECIFIED ESTROGEN RECEPTOR STATUS (HCC): ICD-10-CM

## 2020-10-27 DIAGNOSIS — C50.912 MALIGNANT NEOPLASM OF LEFT FEMALE BREAST, UNSPECIFIED ESTROGEN RECEPTOR STATUS, UNSPECIFIED SITE OF BREAST (HCC): ICD-10-CM

## 2020-10-27 LAB
CHEMOTHERAPY INFUSION START DATE: NORMAL
CHEMOTHERAPY RECORDS: 0.32
CHEMOTHERAPY RECORDS: 1.8
CHEMOTHERAPY RECORDS: 1.8
CHEMOTHERAPY RECORDS: 5040
CHEMOTHERAPY RECORDS: 5040
CHEMOTHERAPY RECORDS: 896
CHEMOTHERAPY RECORDS: NORMAL
CHEMOTHERAPY RX CANCER: NORMAL
DATE 1ST CHEMO CANCER: NORMAL
RAD ONC ARIA COURSE LAST TREATMENT DATE: NORMAL
RAD ONC ARIA COURSE TREATMENT ELAPSED DAYS: NORMAL
RAD ONC ARIA REFERENCE POINT DOSAGE GIVEN TO DATE: 12.6
RAD ONC ARIA REFERENCE POINT DOSAGE GIVEN TO DATE: 2.03
RAD ONC ARIA REFERENCE POINT DOSAGE GIVEN TO DATE: 2.24
RAD ONC ARIA REFERENCE POINT ID: NORMAL
RAD ONC ARIA REFERENCE POINT SESSION DOSAGE GIVEN: 0.29
RAD ONC ARIA REFERENCE POINT SESSION DOSAGE GIVEN: 0.32
RAD ONC ARIA REFERENCE POINT SESSION DOSAGE GIVEN: 1.8

## 2020-10-27 PROCEDURE — 77412 RADIATION TX DELIVERY LVL 3: CPT | Performed by: RADIOLOGY

## 2020-10-27 PROCEDURE — 77417 THER RADIOLOGY PORT IMAGE(S): CPT | Performed by: RADIOLOGY

## 2020-10-27 PROCEDURE — 77080 DXA BONE DENSITY AXIAL: CPT

## 2020-10-28 ENCOUNTER — HOSPITAL ENCOUNTER (OUTPATIENT)
Dept: RADIATION ONCOLOGY | Facility: MEDICAL CENTER | Age: 50
End: 2020-10-28
Payer: MEDICAID

## 2020-10-28 VITALS
OXYGEN SATURATION: 94 % | SYSTOLIC BLOOD PRESSURE: 136 MMHG | TEMPERATURE: 97.1 F | HEART RATE: 82 BPM | DIASTOLIC BLOOD PRESSURE: 84 MMHG

## 2020-10-28 LAB
CHEMOTHERAPY INFUSION START DATE: NORMAL
CHEMOTHERAPY RECORDS: 0.32
CHEMOTHERAPY RECORDS: 1.8
CHEMOTHERAPY RECORDS: 1.8
CHEMOTHERAPY RECORDS: 5040
CHEMOTHERAPY RECORDS: 5040
CHEMOTHERAPY RECORDS: 896
CHEMOTHERAPY RECORDS: NORMAL
CHEMOTHERAPY RX CANCER: NORMAL
DATE 1ST CHEMO CANCER: NORMAL
RAD ONC ARIA COURSE LAST TREATMENT DATE: NORMAL
RAD ONC ARIA COURSE TREATMENT ELAPSED DAYS: NORMAL
RAD ONC ARIA REFERENCE POINT DOSAGE GIVEN TO DATE: 14.4
RAD ONC ARIA REFERENCE POINT DOSAGE GIVEN TO DATE: 2.32
RAD ONC ARIA REFERENCE POINT DOSAGE GIVEN TO DATE: 2.56
RAD ONC ARIA REFERENCE POINT ID: NORMAL
RAD ONC ARIA REFERENCE POINT SESSION DOSAGE GIVEN: 0.29
RAD ONC ARIA REFERENCE POINT SESSION DOSAGE GIVEN: 0.32
RAD ONC ARIA REFERENCE POINT SESSION DOSAGE GIVEN: 1.8

## 2020-10-28 PROCEDURE — 77412 RADIATION TX DELIVERY LVL 3: CPT | Performed by: RADIOLOGY

## 2020-10-28 PROCEDURE — 77336 RADIATION PHYSICS CONSULT: CPT | Performed by: RADIOLOGY

## 2020-10-28 PROCEDURE — 77417 THER RADIOLOGY PORT IMAGE(S): CPT | Performed by: RADIOLOGY

## 2020-10-28 ASSESSMENT — PAIN SCALES - GENERAL: PAINLEVEL: NO PAIN

## 2020-10-28 NOTE — ON TREATMENT VISIT
ON TREATMENT NOTE  RADIATION ONCOLOGY DEPARTMENT    Patient name:  Zahida Rodriguez    Primary Physician:  AKBAR Henry MRN: [id number]  CSN: [id number]   Referring physician:  Codie Carreno M.D. : 1970, 50 y.o.     ENCOUNTER DATE:  10/28/20    DIAGNOSIS:    Malignant neoplasm of upper-outer quadrant of right breast in female, estrogen receptor positive (HCC)  Staging form: Breast, AJCC 8th Edition  - Pathologic: Stage IIIA (pT2, pN3a, cM0, G2, ER+, [place]+, HER2-) - Signed by Amy Kahn M.D. on 2020  Stage prefix: Initial diagnosis  Method of lymph node assessment: Axillary lymph node dissection  Multigene prognostic tests performed: None  Histologic grading system: 3 grade system  Laterality: Right      TREATMENT SUMMARY:  Radiation Treatments     Active   Plans   R CW   Most recent treatment: Dose planned: 180 cGy (fraction 8 of 28 on 10/28/2020)   Total: Dose planned: 5,040 cGy   Elapsed Days: 9 @ [id number]      R PAB   Most recent treatment: Dose planned: 32 cGy (fraction 8 of 28 on 10/28/2020)   Total: Dose planned: 896 cGy   Elapsed Days: 9 @ [id number]      R SCV   Most recent treatment: Dose planned: 180 cGy (fraction 8 of 28 on 10/28/2020)   Total: Dose planned: 5,040 cGy   Elapsed Days: 9 @ [id number]      R CW   Most recent treatment: Dose planned: 180 cGy (fraction 0 of 28 on 10/16/2020)   Total: Dose planned: 5,040 cGy   Elapsed Days:  @       R PAB   Most recent treatment: Dose planned: 29 cGy (fraction 0 of 28 on 10/16/2020)   Total: Dose planned: 813 cGy   Elapsed Days:  @       R SCV   Most recent treatment: Dose planned: 180 cGy (fraction 0 of 28 on 10/16/2020)   Total: Dose planned: 5,040 cGy   Elapsed Days:  @       Reference Points   R CW   Most recent treatment: Dose given: 180 cGy (on 10/28/2020)   Total: Dose given: 1,440 cGy   Elapsed Days: 9 @ [id number]      R CW CP   Most recent treatment: Dose given: 180 cGy (on 10/28/2020)   Total:  Dose given: 1,440 cGy   Elapsed Days: 9 @ [id number]      R SCV   Most recent treatment: Dose given: 180 cGy (on 10/28/2020)   Total: Dose given: 1,440 cGy   Elapsed Days: 9 @ [id number]      R SCV CP   Most recent treatment: Dose given: 180 cGy (on 10/28/2020)   Total: Dose given: 1,440 cGy   Elapsed Days: 9 @ [id number]      Rt PAB   Most recent treatment: Dose given: 32 cGy (on 10/28/2020)   Total: Dose given: 256 cGy   Elapsed Days: 9 @ [id number]      Rt PAB CP   Most recent treatment: Dose given: 29 cGy (on 10/28/2020)   Total: Dose given: 232 cGy   Elapsed Days: 9 @ [id number]      R CW   Most recent treatment: Dose given: - (on 10/16/2020)   Total: Dose given: 0 cGy   Elapsed Days:  @       R CW CP   Most recent treatment: Dose given: - (on 10/16/2020)   Total: Dose given: 0 cGy   Elapsed Days:  @       R SCV   Most recent treatment: Dose given: - (on 10/16/2020)   Total: Dose given: 0 cGy   Elapsed Days:  @       R SCV CP   Most recent treatment: Dose given: - (on 10/16/2020)   Total: Dose given: 0 cGy   Elapsed Days:  @       Rt PAB   Most recent treatment: Dose given: - (on 10/16/2020)   Total: Dose given: 0 cGy   Elapsed Days:  @       Rt PAB CP   Most recent treatment: Dose given: - (on 10/16/2020)   Total: Dose given: 0 cGy   Elapsed Days:  @                     SUBJECTIVE:   Doing well little bit of soreness in the axilla      VITAL SIGNS:  /84 (BP Location: Left arm, Patient Position: Sitting, BP Cuff Size: Adult)   Pulse 82   Temp 36.2 °C (97.1 °F) (Temporal)   SpO2 94%    Encounter Vitals  Temperature: 36.2 °C (97.1 °F)  Temp src: Temporal  Blood Pressure: 136/84  Pulse: 82  Pulse Oximetry: 94 %  Pain Score: No pain  Pain Assessment 10/28/2020 10/21/2020 7/9/2020   Pain Assessment Denies Pain Denies Pain -   Pain Score 0 0 7   Some recent data might be hidden          PHYSICAL EXAM:    Mild erythema in the treatment field    Toxicity Assessment 10/28/2020 10/21/2020   Toxicity  Assessment Breast Breast   Fatigue (lethargy, malaise, asthenia) Increased fatigue over baseline, but not altering normal activities None   Fever (in the absence of neutropenia) None None   Radiation Dermatitis Faint erythema or dry desquamation None   Lymphatics Normal Normal   RT - Pain due to RT None None   Dyspnea Normal Normal         IMPRESSION:  Cancer Staging  Malignant neoplasm of upper-outer quadrant of right breast in female, estrogen receptor positive (HCC)  Staging form: Breast, AJCC 8th Edition  - Pathologic: Stage IIIA (pT2, pN3a, cM0, G2, ER+, [place]+, HER2-) - Signed by Amy Kahn M.D. on 4/21/2020      PLAN:  No change in treatment plan .  Given Aquaphor    Disposition:  Treatment plan reviewed. Questions answered. Continue therapy outlined.     Amy Kahn M.D.    No orders of the defined types were placed in this encounter.

## 2020-10-29 ENCOUNTER — HOSPITAL ENCOUNTER (OUTPATIENT)
Dept: RADIATION ONCOLOGY | Facility: MEDICAL CENTER | Age: 50
End: 2020-10-29
Payer: MEDICAID

## 2020-10-29 LAB
CHEMOTHERAPY INFUSION START DATE: NORMAL
CHEMOTHERAPY RECORDS: 0.32
CHEMOTHERAPY RECORDS: 1.8
CHEMOTHERAPY RECORDS: 1.8
CHEMOTHERAPY RECORDS: 5040
CHEMOTHERAPY RECORDS: 5040
CHEMOTHERAPY RECORDS: 896
CHEMOTHERAPY RECORDS: NORMAL
CHEMOTHERAPY RX CANCER: NORMAL
DATE 1ST CHEMO CANCER: NORMAL
RAD ONC ARIA COURSE LAST TREATMENT DATE: NORMAL
RAD ONC ARIA COURSE TREATMENT ELAPSED DAYS: NORMAL
RAD ONC ARIA REFERENCE POINT DOSAGE GIVEN TO DATE: 16.2
RAD ONC ARIA REFERENCE POINT DOSAGE GIVEN TO DATE: 2.61
RAD ONC ARIA REFERENCE POINT DOSAGE GIVEN TO DATE: 2.88
RAD ONC ARIA REFERENCE POINT ID: NORMAL
RAD ONC ARIA REFERENCE POINT SESSION DOSAGE GIVEN: 0.29
RAD ONC ARIA REFERENCE POINT SESSION DOSAGE GIVEN: 0.32
RAD ONC ARIA REFERENCE POINT SESSION DOSAGE GIVEN: 1.8

## 2020-10-29 PROCEDURE — 77417 THER RADIOLOGY PORT IMAGE(S): CPT | Performed by: RADIOLOGY

## 2020-10-29 PROCEDURE — 77412 RADIATION TX DELIVERY LVL 3: CPT | Performed by: RADIOLOGY

## 2020-11-02 ENCOUNTER — HOSPITAL ENCOUNTER (OUTPATIENT)
Dept: RADIATION ONCOLOGY | Facility: MEDICAL CENTER | Age: 50
End: 2020-11-30
Attending: RADIOLOGY

## 2020-11-02 ENCOUNTER — HOSPITAL ENCOUNTER (OUTPATIENT)
Dept: RADIATION ONCOLOGY | Facility: MEDICAL CENTER | Age: 50
End: 2020-11-02
Payer: MEDICAID

## 2020-11-02 LAB
CHEMOTHERAPY INFUSION START DATE: NORMAL
CHEMOTHERAPY RECORDS: 0.32
CHEMOTHERAPY RECORDS: 1.8
CHEMOTHERAPY RECORDS: 1.8
CHEMOTHERAPY RECORDS: 5040
CHEMOTHERAPY RECORDS: 5040
CHEMOTHERAPY RECORDS: 896
CHEMOTHERAPY RECORDS: NORMAL
CHEMOTHERAPY RX CANCER: NORMAL
DATE 1ST CHEMO CANCER: NORMAL
RAD ONC ARIA COURSE LAST TREATMENT DATE: NORMAL
RAD ONC ARIA COURSE TREATMENT ELAPSED DAYS: NORMAL
RAD ONC ARIA REFERENCE POINT DOSAGE GIVEN TO DATE: 18
RAD ONC ARIA REFERENCE POINT DOSAGE GIVEN TO DATE: 2.9
RAD ONC ARIA REFERENCE POINT DOSAGE GIVEN TO DATE: 3.2
RAD ONC ARIA REFERENCE POINT ID: NORMAL
RAD ONC ARIA REFERENCE POINT SESSION DOSAGE GIVEN: 0.29
RAD ONC ARIA REFERENCE POINT SESSION DOSAGE GIVEN: 0.32
RAD ONC ARIA REFERENCE POINT SESSION DOSAGE GIVEN: 1.8

## 2020-11-02 PROCEDURE — 77417 THER RADIOLOGY PORT IMAGE(S): CPT | Performed by: RADIOLOGY

## 2020-11-02 PROCEDURE — 77427 RADIATION TX MANAGEMENT X5: CPT | Performed by: RADIOLOGY

## 2020-11-02 PROCEDURE — 77412 RADIATION TX DELIVERY LVL 3: CPT | Performed by: RADIOLOGY

## 2020-11-03 ENCOUNTER — HOSPITAL ENCOUNTER (OUTPATIENT)
Dept: RADIATION ONCOLOGY | Facility: MEDICAL CENTER | Age: 50
End: 2020-11-03
Payer: MEDICAID

## 2020-11-03 LAB
CHEMOTHERAPY INFUSION START DATE: NORMAL
CHEMOTHERAPY RECORDS: 0.32
CHEMOTHERAPY RECORDS: 1.8
CHEMOTHERAPY RECORDS: 1.8
CHEMOTHERAPY RECORDS: 5040
CHEMOTHERAPY RECORDS: 5040
CHEMOTHERAPY RECORDS: 896
CHEMOTHERAPY RECORDS: NORMAL
CHEMOTHERAPY RX CANCER: NORMAL
DATE 1ST CHEMO CANCER: NORMAL
RAD ONC ARIA COURSE LAST TREATMENT DATE: NORMAL
RAD ONC ARIA COURSE TREATMENT ELAPSED DAYS: NORMAL
RAD ONC ARIA REFERENCE POINT DOSAGE GIVEN TO DATE: 19.8
RAD ONC ARIA REFERENCE POINT DOSAGE GIVEN TO DATE: 3.19
RAD ONC ARIA REFERENCE POINT DOSAGE GIVEN TO DATE: 3.52
RAD ONC ARIA REFERENCE POINT ID: NORMAL
RAD ONC ARIA REFERENCE POINT SESSION DOSAGE GIVEN: 0.29
RAD ONC ARIA REFERENCE POINT SESSION DOSAGE GIVEN: 0.32
RAD ONC ARIA REFERENCE POINT SESSION DOSAGE GIVEN: 1.8

## 2020-11-03 PROCEDURE — 77417 THER RADIOLOGY PORT IMAGE(S): CPT | Performed by: RADIOLOGY

## 2020-11-03 PROCEDURE — 77412 RADIATION TX DELIVERY LVL 3: CPT | Performed by: RADIOLOGY

## 2020-11-04 ENCOUNTER — HOSPITAL ENCOUNTER (OUTPATIENT)
Dept: RADIATION ONCOLOGY | Facility: MEDICAL CENTER | Age: 50
End: 2020-11-30
Attending: RADIOLOGY

## 2020-11-04 ENCOUNTER — HOSPITAL ENCOUNTER (OUTPATIENT)
Dept: RADIATION ONCOLOGY | Facility: MEDICAL CENTER | Age: 50
End: 2020-11-04
Payer: MEDICAID

## 2020-11-04 VITALS
OXYGEN SATURATION: 96 % | DIASTOLIC BLOOD PRESSURE: 81 MMHG | TEMPERATURE: 97.3 F | SYSTOLIC BLOOD PRESSURE: 134 MMHG | HEART RATE: 87 BPM

## 2020-11-04 LAB
CHEMOTHERAPY INFUSION START DATE: NORMAL
CHEMOTHERAPY RECORDS: 0.32
CHEMOTHERAPY RECORDS: 1.8
CHEMOTHERAPY RECORDS: 1.8
CHEMOTHERAPY RECORDS: 5040
CHEMOTHERAPY RECORDS: 5040
CHEMOTHERAPY RECORDS: 896
CHEMOTHERAPY RECORDS: NORMAL
CHEMOTHERAPY RX CANCER: NORMAL
DATE 1ST CHEMO CANCER: NORMAL
RAD ONC ARIA COURSE LAST TREATMENT DATE: NORMAL
RAD ONC ARIA COURSE TREATMENT ELAPSED DAYS: NORMAL
RAD ONC ARIA REFERENCE POINT DOSAGE GIVEN TO DATE: 21.6
RAD ONC ARIA REFERENCE POINT DOSAGE GIVEN TO DATE: 3.48
RAD ONC ARIA REFERENCE POINT DOSAGE GIVEN TO DATE: 3.84
RAD ONC ARIA REFERENCE POINT ID: NORMAL
RAD ONC ARIA REFERENCE POINT SESSION DOSAGE GIVEN: 0.29
RAD ONC ARIA REFERENCE POINT SESSION DOSAGE GIVEN: 0.32
RAD ONC ARIA REFERENCE POINT SESSION DOSAGE GIVEN: 1.8

## 2020-11-04 PROCEDURE — 77412 RADIATION TX DELIVERY LVL 3: CPT | Performed by: RADIOLOGY

## 2020-11-04 PROCEDURE — 77417 THER RADIOLOGY PORT IMAGE(S): CPT | Performed by: RADIOLOGY

## 2020-11-04 ASSESSMENT — PAIN SCALES - GENERAL: PAINLEVEL: 1=MINIMAL PAIN

## 2020-11-04 NOTE — ON TREATMENT VISIT
ON TREATMENT NOTE  RADIATION ONCOLOGY DEPARTMENT    Patient name:  Zahida Rodriguez    Primary Physician:  AKBAR Henry MRN: [id number]  CSN: [id number]   Referring physician:  Codie Carreno M.D. : 1970, 50 y.o.     ENCOUNTER DATE:  20    DIAGNOSIS:    Malignant neoplasm of upper-outer quadrant of right breast in female, estrogen receptor positive (HCC)  Staging form: Breast, AJCC 8th Edition  - Pathologic: Stage IIIA (pT2, pN3a, cM0, G2, ER+, [place]+, HER2-) - Signed by Amy Kahn M.D. on 2020  Stage prefix: Initial diagnosis  Method of lymph node assessment: Axillary lymph node dissection  Multigene prognostic tests performed: None  Histologic grading system: 3 grade system  Laterality: Right      TREATMENT SUMMARY:  Radiation Treatments     Active   Plans   R CW   Most recent treatment: Dose planned: 180 cGy (fraction 12 of 28 on 2020)   Total: Dose planned: 5,040 cGy   Elapsed Days: 16 @ [id number]      R PAB   Most recent treatment: Dose planned: 32 cGy (fraction 12 of 28 on 2020)   Total: Dose planned: 896 cGy   Elapsed Days: 16 @ [id number]      R SCV   Most recent treatment: Dose planned: 180 cGy (fraction 12 of 28 on 2020)   Total: Dose planned: 5,040 cGy   Elapsed Days: 16 @ [id number]      R CW   Most recent treatment: Dose planned: 180 cGy (fraction 0 of 28 on 10/16/2020)   Total: Dose planned: 5,040 cGy   Elapsed Days:  @       R PAB   Most recent treatment: Dose planned: 29 cGy (fraction 0 of 28 on 10/16/2020)   Total: Dose planned: 813 cGy   Elapsed Days:  @       R SCV   Most recent treatment: Dose planned: 180 cGy (fraction 0 of 28 on 10/16/2020)   Total: Dose planned: 5,040 cGy   Elapsed Days:  @       Reference Points   R CW   Most recent treatment: Dose given: 180 cGy (on 2020)   Total: Dose given: 2,160 cGy   Elapsed Days: 16 @ [id number]      R CW CP   Most recent treatment: Dose given: 180 cGy (on 2020)   Total:  Dose given: 2,160 cGy   Elapsed Days: 16 @ [id number]      R SCV   Most recent treatment: Dose given: 180 cGy (on 11/4/2020)   Total: Dose given: 2,160 cGy   Elapsed Days: 16 @ [id number]      R SCV CP   Most recent treatment: Dose given: 180 cGy (on 11/4/2020)   Total: Dose given: 2,160 cGy   Elapsed Days: 16 @ [id number]      Rt PAB   Most recent treatment: Dose given: 32 cGy (on 11/4/2020)   Total: Dose given: 384 cGy   Elapsed Days: 16 @ [id number]      Rt PAB CP   Most recent treatment: Dose given: 29 cGy (on 11/4/2020)   Total: Dose given: 348 cGy   Elapsed Days: 16 @ [id number]      R CW   Most recent treatment: Dose given: - (on 10/16/2020)   Total: Dose given: 0 cGy   Elapsed Days:  @       R CW CP   Most recent treatment: Dose given: - (on 10/16/2020)   Total: Dose given: 0 cGy   Elapsed Days:  @       R SCV   Most recent treatment: Dose given: - (on 10/16/2020)   Total: Dose given: 0 cGy   Elapsed Days:  @       R SCV CP   Most recent treatment: Dose given: - (on 10/16/2020)   Total: Dose given: 0 cGy   Elapsed Days:  @       Rt PAB   Most recent treatment: Dose given: - (on 10/16/2020)   Total: Dose given: 0 cGy   Elapsed Days:  @       Rt PAB CP   Most recent treatment: Dose given: - (on 10/16/2020)   Total: Dose given: 0 cGy   Elapsed Days:  @                     SUBJECTIVE:   Doing well slight fatigue slight irritation in the treatment field      VITAL SIGNS:  /81 (BP Location: Left arm, Patient Position: Sitting, BP Cuff Size: Adult)   Pulse 87   Temp 36.3 °C (97.3 °F) (Temporal)   SpO2 96%    Encounter Vitals  Temperature: 36.3 °C (97.3 °F)  Temp src: Temporal  Blood Pressure: 134/81  Pulse: 87  Pulse Oximetry: 96 %  Pain Score: 1=Minimal Pain  Pain Assessment 11/4/2020 10/28/2020 10/21/2020   Pain Assessment Denies Pain Denies Pain Denies Pain   Pain Score 1 0 0   Pain Loc Breast - -   Some recent data might be hidden          PHYSICAL EXAM:    Mild erythema in the treatment  field    Toxicity Assessment 11/4/2020 10/28/2020 10/21/2020   Toxicity Assessment Breast Breast Breast   Fatigue (lethargy, malaise, asthenia) Increased fatigue over baseline, but not altering normal activities Increased fatigue over baseline, but not altering normal activities None   Fever (in the absence of neutropenia) None None None   Radiation Dermatitis Faint erythema or dry desquamation Faint erythema or dry desquamation None   Lymphatics Normal Normal Normal   RT - Pain due to RT Mild pain not interfering with function None None   Dyspnea Normal Normal Normal         IMPRESSION:  Cancer Staging  Malignant neoplasm of upper-outer quadrant of right breast in female, estrogen receptor positive (HCC)  Staging form: Breast, AJCC 8th Edition  - Pathologic: Stage IIIA (pT2, pN3a, cM0, G2, ER+, [place]+, HER2-) - Signed by Amy Kahn M.D. on 4/21/2020      PLAN:  No change in treatment plan .  Recommended more liberal use of creams in the treatment area.    Disposition:  Treatment plan reviewed. Questions answered. Continue therapy outlined.     Amy Kahn M.D.    No orders of the defined types were placed in this encounter.

## 2020-11-05 ENCOUNTER — HOSPITAL ENCOUNTER (OUTPATIENT)
Dept: RADIATION ONCOLOGY | Facility: MEDICAL CENTER | Age: 50
End: 2020-11-05
Payer: MEDICAID

## 2020-11-05 LAB
CHEMOTHERAPY INFUSION START DATE: NORMAL
CHEMOTHERAPY RECORDS: 0.32
CHEMOTHERAPY RECORDS: 1.8
CHEMOTHERAPY RECORDS: 1.8
CHEMOTHERAPY RECORDS: 5040
CHEMOTHERAPY RECORDS: 5040
CHEMOTHERAPY RECORDS: 896
CHEMOTHERAPY RECORDS: NORMAL
CHEMOTHERAPY RX CANCER: NORMAL
DATE 1ST CHEMO CANCER: NORMAL
RAD ONC ARIA COURSE LAST TREATMENT DATE: NORMAL
RAD ONC ARIA COURSE TREATMENT ELAPSED DAYS: NORMAL
RAD ONC ARIA REFERENCE POINT DOSAGE GIVEN TO DATE: 23.4
RAD ONC ARIA REFERENCE POINT DOSAGE GIVEN TO DATE: 3.77
RAD ONC ARIA REFERENCE POINT DOSAGE GIVEN TO DATE: 4.16
RAD ONC ARIA REFERENCE POINT ID: NORMAL
RAD ONC ARIA REFERENCE POINT SESSION DOSAGE GIVEN: 0.29
RAD ONC ARIA REFERENCE POINT SESSION DOSAGE GIVEN: 0.32
RAD ONC ARIA REFERENCE POINT SESSION DOSAGE GIVEN: 1.8

## 2020-11-05 PROCEDURE — 77417 THER RADIOLOGY PORT IMAGE(S): CPT | Performed by: RADIOLOGY

## 2020-11-05 PROCEDURE — 77412 RADIATION TX DELIVERY LVL 3: CPT | Performed by: RADIOLOGY

## 2020-11-05 PROCEDURE — 77336 RADIATION PHYSICS CONSULT: CPT | Performed by: RADIOLOGY

## 2020-11-06 ENCOUNTER — HOSPITAL ENCOUNTER (OUTPATIENT)
Dept: RADIATION ONCOLOGY | Facility: MEDICAL CENTER | Age: 50
End: 2020-11-06
Payer: MEDICAID

## 2020-11-06 LAB
CHEMOTHERAPY INFUSION START DATE: NORMAL
CHEMOTHERAPY RECORDS: 0.32
CHEMOTHERAPY RECORDS: 1.8
CHEMOTHERAPY RECORDS: 1.8
CHEMOTHERAPY RECORDS: 5040
CHEMOTHERAPY RECORDS: 5040
CHEMOTHERAPY RECORDS: 896
CHEMOTHERAPY RECORDS: NORMAL
CHEMOTHERAPY RX CANCER: NORMAL
DATE 1ST CHEMO CANCER: NORMAL
RAD ONC ARIA COURSE LAST TREATMENT DATE: NORMAL
RAD ONC ARIA COURSE TREATMENT ELAPSED DAYS: NORMAL
RAD ONC ARIA REFERENCE POINT DOSAGE GIVEN TO DATE: 25.2
RAD ONC ARIA REFERENCE POINT DOSAGE GIVEN TO DATE: 4.06
RAD ONC ARIA REFERENCE POINT DOSAGE GIVEN TO DATE: 4.48
RAD ONC ARIA REFERENCE POINT ID: NORMAL
RAD ONC ARIA REFERENCE POINT SESSION DOSAGE GIVEN: 0.29
RAD ONC ARIA REFERENCE POINT SESSION DOSAGE GIVEN: 0.32
RAD ONC ARIA REFERENCE POINT SESSION DOSAGE GIVEN: 1.8

## 2020-11-06 PROCEDURE — 77412 RADIATION TX DELIVERY LVL 3: CPT | Performed by: RADIOLOGY

## 2020-11-06 PROCEDURE — 77417 THER RADIOLOGY PORT IMAGE(S): CPT | Performed by: RADIOLOGY

## 2020-11-09 ENCOUNTER — HOSPITAL ENCOUNTER (OUTPATIENT)
Dept: RADIATION ONCOLOGY | Facility: MEDICAL CENTER | Age: 50
End: 2020-11-09
Payer: MEDICAID

## 2020-11-09 LAB
CHEMOTHERAPY INFUSION START DATE: NORMAL
CHEMOTHERAPY RECORDS: 0.32
CHEMOTHERAPY RECORDS: 1.8
CHEMOTHERAPY RECORDS: 1.8
CHEMOTHERAPY RECORDS: 5040
CHEMOTHERAPY RECORDS: 5040
CHEMOTHERAPY RECORDS: 896
CHEMOTHERAPY RECORDS: NORMAL
CHEMOTHERAPY RX CANCER: NORMAL
DATE 1ST CHEMO CANCER: NORMAL
RAD ONC ARIA COURSE LAST TREATMENT DATE: NORMAL
RAD ONC ARIA COURSE TREATMENT ELAPSED DAYS: NORMAL
RAD ONC ARIA REFERENCE POINT DOSAGE GIVEN TO DATE: 27
RAD ONC ARIA REFERENCE POINT DOSAGE GIVEN TO DATE: 4.36
RAD ONC ARIA REFERENCE POINT DOSAGE GIVEN TO DATE: 4.8
RAD ONC ARIA REFERENCE POINT ID: NORMAL
RAD ONC ARIA REFERENCE POINT SESSION DOSAGE GIVEN: 0.29
RAD ONC ARIA REFERENCE POINT SESSION DOSAGE GIVEN: 0.32
RAD ONC ARIA REFERENCE POINT SESSION DOSAGE GIVEN: 1.8

## 2020-11-09 PROCEDURE — 77417 THER RADIOLOGY PORT IMAGE(S): CPT | Performed by: RADIOLOGY

## 2020-11-09 PROCEDURE — 77412 RADIATION TX DELIVERY LVL 3: CPT | Performed by: RADIOLOGY

## 2020-11-09 PROCEDURE — 77427 RADIATION TX MANAGEMENT X5: CPT | Performed by: RADIOLOGY

## 2020-11-10 ENCOUNTER — HOSPITAL ENCOUNTER (OUTPATIENT)
Dept: RADIATION ONCOLOGY | Facility: MEDICAL CENTER | Age: 50
End: 2020-11-10
Payer: MEDICAID

## 2020-11-10 LAB
CHEMOTHERAPY INFUSION START DATE: NORMAL
CHEMOTHERAPY RECORDS: 0.32
CHEMOTHERAPY RECORDS: 1.8
CHEMOTHERAPY RECORDS: 1.8
CHEMOTHERAPY RECORDS: 5040
CHEMOTHERAPY RECORDS: 5040
CHEMOTHERAPY RECORDS: 896
CHEMOTHERAPY RECORDS: NORMAL
CHEMOTHERAPY RX CANCER: NORMAL
DATE 1ST CHEMO CANCER: NORMAL
RAD ONC ARIA COURSE LAST TREATMENT DATE: NORMAL
RAD ONC ARIA COURSE TREATMENT ELAPSED DAYS: NORMAL
RAD ONC ARIA REFERENCE POINT DOSAGE GIVEN TO DATE: 28.8
RAD ONC ARIA REFERENCE POINT DOSAGE GIVEN TO DATE: 4.65
RAD ONC ARIA REFERENCE POINT DOSAGE GIVEN TO DATE: 5.12
RAD ONC ARIA REFERENCE POINT ID: NORMAL
RAD ONC ARIA REFERENCE POINT SESSION DOSAGE GIVEN: 0.29
RAD ONC ARIA REFERENCE POINT SESSION DOSAGE GIVEN: 0.32
RAD ONC ARIA REFERENCE POINT SESSION DOSAGE GIVEN: 1.8

## 2020-11-10 PROCEDURE — 77417 THER RADIOLOGY PORT IMAGE(S): CPT | Performed by: RADIOLOGY

## 2020-11-10 PROCEDURE — 77412 RADIATION TX DELIVERY LVL 3: CPT | Performed by: RADIOLOGY

## 2020-11-11 ENCOUNTER — HOSPITAL ENCOUNTER (OUTPATIENT)
Dept: RADIATION ONCOLOGY | Facility: MEDICAL CENTER | Age: 50
End: 2020-11-30
Attending: RADIOLOGY

## 2020-11-11 ENCOUNTER — HOSPITAL ENCOUNTER (OUTPATIENT)
Dept: RADIATION ONCOLOGY | Facility: MEDICAL CENTER | Age: 50
End: 2020-11-11
Payer: MEDICAID

## 2020-11-11 VITALS
TEMPERATURE: 97.7 F | HEART RATE: 87 BPM | SYSTOLIC BLOOD PRESSURE: 151 MMHG | OXYGEN SATURATION: 94 % | DIASTOLIC BLOOD PRESSURE: 75 MMHG

## 2020-11-11 LAB
CHEMOTHERAPY INFUSION START DATE: NORMAL
CHEMOTHERAPY RECORDS: 0.32
CHEMOTHERAPY RECORDS: 1.8
CHEMOTHERAPY RECORDS: 1.8
CHEMOTHERAPY RECORDS: 5040
CHEMOTHERAPY RECORDS: 5040
CHEMOTHERAPY RECORDS: 896
CHEMOTHERAPY RECORDS: NORMAL
CHEMOTHERAPY RX CANCER: NORMAL
DATE 1ST CHEMO CANCER: NORMAL
RAD ONC ARIA COURSE LAST TREATMENT DATE: NORMAL
RAD ONC ARIA COURSE TREATMENT ELAPSED DAYS: NORMAL
RAD ONC ARIA REFERENCE POINT DOSAGE GIVEN TO DATE: 30.6
RAD ONC ARIA REFERENCE POINT DOSAGE GIVEN TO DATE: 4.94
RAD ONC ARIA REFERENCE POINT DOSAGE GIVEN TO DATE: 5.44
RAD ONC ARIA REFERENCE POINT ID: NORMAL
RAD ONC ARIA REFERENCE POINT SESSION DOSAGE GIVEN: 0.29
RAD ONC ARIA REFERENCE POINT SESSION DOSAGE GIVEN: 0.32
RAD ONC ARIA REFERENCE POINT SESSION DOSAGE GIVEN: 1.8

## 2020-11-11 PROCEDURE — 77417 THER RADIOLOGY PORT IMAGE(S): CPT | Performed by: RADIOLOGY

## 2020-11-11 PROCEDURE — 77412 RADIATION TX DELIVERY LVL 3: CPT | Performed by: RADIOLOGY

## 2020-11-11 ASSESSMENT — PAIN SCALES - GENERAL: PAINLEVEL: NO PAIN

## 2020-11-11 NOTE — ON TREATMENT VISIT
ON TREATMENT NOTE  RADIATION ONCOLOGY DEPARTMENT    Patient name:  Zahida Rodriguez    Primary Physician:  AKBAR Henry MRN: [id number]  CSN: [id number]   Referring physician:  Codie Carreno M.D. : 1970, 50 y.o.     ENCOUNTER DATE:  20    DIAGNOSIS:    Malignant neoplasm of upper-outer quadrant of right breast in female, estrogen receptor positive (HCC)  Staging form: Breast, AJCC 8th Edition  - Pathologic: Stage IIIA (pT2, pN3a, cM0, G2, ER+, CT+, HER2-) - Signed by Amy Kahn M.D. on 2020  Stage prefix: Initial diagnosis  Method of lymph node assessment: Axillary lymph node dissection  Multigene prognostic tests performed: None  Histologic grading system: 3 grade system  Laterality: Right      TREATMENT SUMMARY:  Radiation Treatments     Active   Plans   R CW   Most recent treatment: Dose planned: 180 cGy (fraction 17 of 28 on 2020)   Total: Dose planned: 5,040 cGy   Elapsed Days: 23 @ [id number]      R PAB   Most recent treatment: Dose planned: 32 cGy (fraction 17 of 28 on 2020)   Total: Dose planned: 896 cGy   Elapsed Days: 23 @ [id number]      R SCV   Most recent treatment: Dose planned: 180 cGy (fraction 17 of 28 on 2020)   Total: Dose planned: 5,040 cGy   Elapsed Days: 23 @ [id number]      R CW   Most recent treatment: Dose planned: 180 cGy (fraction 0 of 28 on 10/16/2020)   Total: Dose planned: 5,040 cGy   Elapsed Days:  @       R PAB   Most recent treatment: Dose planned: 29 cGy (fraction 0 of 28 on 10/16/2020)   Total: Dose planned: 813 cGy   Elapsed Days:  @       R SCV   Most recent treatment: Dose planned: 180 cGy (fraction 0 of 28 on 10/16/2020)   Total: Dose planned: 5,040 cGy   Elapsed Days:  @       Reference Points   R CW   Most recent treatment: Dose given: 180 cGy (on 2020)   Total: Dose given: 3,060 cGy   Elapsed Days: 23 @ [id number]      R CW CP   Most recent treatment: Dose given: 180 cGy (on 2020)    Total: Dose given: 3,060 cGy   Elapsed Days: 23 @ [id number]      R SCV   Most recent treatment: Dose given: 180 cGy (on 11/11/2020)   Total: Dose given: 3,060 cGy   Elapsed Days: 23 @ [id number]      R SCV CP   Most recent treatment: Dose given: 180 cGy (on 11/11/2020)   Total: Dose given: 3,060 cGy   Elapsed Days: 23 @ [id number]      Rt PAB   Most recent treatment: Dose given: 32 cGy (on 11/11/2020)   Total: Dose given: 544 cGy   Elapsed Days: 23 @ [id number]      Rt PAB CP   Most recent treatment: Dose given: 29 cGy (on 11/11/2020)   Total: Dose given: 494 cGy   Elapsed Days: 23 @ [id number]      R CW   Most recent treatment: Dose given: - (on 10/16/2020)   Total: Dose given: 0 cGy   Elapsed Days:  @       R CW CP   Most recent treatment: Dose given: - (on 10/16/2020)   Total: Dose given: 0 cGy   Elapsed Days:  @       R SCV   Most recent treatment: Dose given: - (on 10/16/2020)   Total: Dose given: 0 cGy   Elapsed Days:  @       R SCV CP   Most recent treatment: Dose given: - (on 10/16/2020)   Total: Dose given: 0 cGy   Elapsed Days:  @       Rt PAB   Most recent treatment: Dose given: - (on 10/16/2020)   Total: Dose given: 0 cGy   Elapsed Days:  @       Rt PAB CP   Most recent treatment: Dose given: - (on 10/16/2020)   Total: Dose given: 0 cGy   Elapsed Days:  @                     SUBJECTIVE:   Patient doing well she is just anxious about her diagnosis.      VITAL SIGNS:  /75 (BP Location: Left arm, Patient Position: Sitting, BP Cuff Size: Adult)   Pulse 87   Temp 36.5 °C (97.7 °F) (Temporal)   SpO2 94%    Encounter Vitals  Temperature: 36.5 °C (97.7 °F)  Temp src: Temporal  Blood Pressure: 151/75  Pulse: 87  Pulse Oximetry: 94 %  Pain Score: No pain  Pain Assessment 11/11/2020 11/4/2020 10/28/2020   Pain Assessment Denies Pain Denies Pain Denies Pain   Pain Score 0 1 0   Pain Loc - Breast -   Some recent data might be hidden          PHYSICAL EXAM:    No erythema    Toxicity Assessment  11/11/2020 11/4/2020 10/28/2020 10/21/2020   Toxicity Assessment Breast Breast Breast Breast   Fatigue (lethargy, malaise, asthenia) Increased fatigue over baseline, but not altering normal activities Increased fatigue over baseline, but not altering normal activities Increased fatigue over baseline, but not altering normal activities None   Fever (in the absence of neutropenia) None None None None   Radiation Dermatitis Faint erythema or dry desquamation Faint erythema or dry desquamation Faint erythema or dry desquamation None   Lymphatics Normal Normal Normal Normal   RT - Pain due to RT None Mild pain not interfering with function None None   Dyspnea Normal Normal Normal Normal         IMPRESSION:  Cancer Staging  Malignant neoplasm of upper-outer quadrant of right breast in female, estrogen receptor positive (HCC)  Staging form: Breast, AJCC 8th Edition  - Pathologic: Stage IIIA (pT2, pN3a, cM0, G2, ER+, NH+, HER2-) - Signed by Amy Kahn M.D. on 4/21/2020      PLAN:  No change in treatment plan .  We reviewed her course.  She initially and her original breast cancer on the left had neoadjuvant chemotherapy for ER/NH positive HER-2/ruel negative she was a clinical stage I apparently and had residual ypT1c disease on that side.  Then she had hormone blockade and now she has a locally advanced with multiple positive nodes.  She understands that she underwent AC/Taxol on the previous go-round and she had TC this time because of the prior chemo.    Disposition:  Treatment plan reviewed. Questions answered. Continue therapy outlined.     Amy Kahn M.D.    No orders of the defined types were placed in this encounter.

## 2020-11-12 ENCOUNTER — HOSPITAL ENCOUNTER (OUTPATIENT)
Dept: RADIATION ONCOLOGY | Facility: MEDICAL CENTER | Age: 50
End: 2020-11-12
Payer: MEDICAID

## 2020-11-12 LAB
CHEMOTHERAPY INFUSION START DATE: NORMAL
CHEMOTHERAPY RECORDS: 0.32
CHEMOTHERAPY RECORDS: 1.8
CHEMOTHERAPY RECORDS: 1.8
CHEMOTHERAPY RECORDS: 5040
CHEMOTHERAPY RECORDS: 5040
CHEMOTHERAPY RECORDS: 896
CHEMOTHERAPY RECORDS: NORMAL
CHEMOTHERAPY RX CANCER: NORMAL
DATE 1ST CHEMO CANCER: NORMAL
RAD ONC ARIA COURSE LAST TREATMENT DATE: NORMAL
RAD ONC ARIA COURSE TREATMENT ELAPSED DAYS: NORMAL
RAD ONC ARIA REFERENCE POINT DOSAGE GIVEN TO DATE: 32.4
RAD ONC ARIA REFERENCE POINT DOSAGE GIVEN TO DATE: 5.23
RAD ONC ARIA REFERENCE POINT DOSAGE GIVEN TO DATE: 5.76
RAD ONC ARIA REFERENCE POINT ID: NORMAL
RAD ONC ARIA REFERENCE POINT SESSION DOSAGE GIVEN: 0.29
RAD ONC ARIA REFERENCE POINT SESSION DOSAGE GIVEN: 0.32
RAD ONC ARIA REFERENCE POINT SESSION DOSAGE GIVEN: 1.8

## 2020-11-12 PROCEDURE — 77336 RADIATION PHYSICS CONSULT: CPT | Performed by: RADIOLOGY

## 2020-11-12 PROCEDURE — 77417 THER RADIOLOGY PORT IMAGE(S): CPT | Performed by: RADIOLOGY

## 2020-11-12 PROCEDURE — 77412 RADIATION TX DELIVERY LVL 3: CPT | Performed by: RADIOLOGY

## 2020-11-13 ENCOUNTER — HOSPITAL ENCOUNTER (OUTPATIENT)
Dept: RADIATION ONCOLOGY | Facility: MEDICAL CENTER | Age: 50
End: 2020-11-13
Payer: MEDICAID

## 2020-11-13 LAB
CHEMOTHERAPY INFUSION START DATE: NORMAL
CHEMOTHERAPY RECORDS: 0.32
CHEMOTHERAPY RECORDS: 1.8
CHEMOTHERAPY RECORDS: 1.8
CHEMOTHERAPY RECORDS: 5040
CHEMOTHERAPY RECORDS: 5040
CHEMOTHERAPY RECORDS: 896
CHEMOTHERAPY RECORDS: NORMAL
CHEMOTHERAPY RX CANCER: NORMAL
DATE 1ST CHEMO CANCER: NORMAL
RAD ONC ARIA COURSE LAST TREATMENT DATE: NORMAL
RAD ONC ARIA COURSE TREATMENT ELAPSED DAYS: NORMAL
RAD ONC ARIA REFERENCE POINT DOSAGE GIVEN TO DATE: 34.2
RAD ONC ARIA REFERENCE POINT DOSAGE GIVEN TO DATE: 5.52
RAD ONC ARIA REFERENCE POINT DOSAGE GIVEN TO DATE: 6.08
RAD ONC ARIA REFERENCE POINT ID: NORMAL
RAD ONC ARIA REFERENCE POINT SESSION DOSAGE GIVEN: 0.29
RAD ONC ARIA REFERENCE POINT SESSION DOSAGE GIVEN: 0.32
RAD ONC ARIA REFERENCE POINT SESSION DOSAGE GIVEN: 1.8

## 2020-11-13 PROCEDURE — 77417 THER RADIOLOGY PORT IMAGE(S): CPT | Performed by: RADIOLOGY

## 2020-11-13 PROCEDURE — 77412 RADIATION TX DELIVERY LVL 3: CPT | Performed by: RADIOLOGY

## 2020-11-16 ENCOUNTER — HOSPITAL ENCOUNTER (OUTPATIENT)
Dept: RADIATION ONCOLOGY | Facility: MEDICAL CENTER | Age: 50
End: 2020-11-16
Payer: MEDICAID

## 2020-11-16 LAB
CHEMOTHERAPY INFUSION START DATE: NORMAL
CHEMOTHERAPY RECORDS: 0.32
CHEMOTHERAPY RECORDS: 1.8
CHEMOTHERAPY RECORDS: 1.8
CHEMOTHERAPY RECORDS: 5040
CHEMOTHERAPY RECORDS: 5040
CHEMOTHERAPY RECORDS: 896
CHEMOTHERAPY RECORDS: NORMAL
CHEMOTHERAPY RX CANCER: NORMAL
DATE 1ST CHEMO CANCER: NORMAL
RAD ONC ARIA COURSE LAST TREATMENT DATE: NORMAL
RAD ONC ARIA COURSE TREATMENT ELAPSED DAYS: NORMAL
RAD ONC ARIA REFERENCE POINT DOSAGE GIVEN TO DATE: 36
RAD ONC ARIA REFERENCE POINT DOSAGE GIVEN TO DATE: 5.81
RAD ONC ARIA REFERENCE POINT DOSAGE GIVEN TO DATE: 6.4
RAD ONC ARIA REFERENCE POINT ID: NORMAL
RAD ONC ARIA REFERENCE POINT SESSION DOSAGE GIVEN: 0.29
RAD ONC ARIA REFERENCE POINT SESSION DOSAGE GIVEN: 0.32
RAD ONC ARIA REFERENCE POINT SESSION DOSAGE GIVEN: 1.8

## 2020-11-16 PROCEDURE — 77412 RADIATION TX DELIVERY LVL 3: CPT | Performed by: RADIOLOGY

## 2020-11-16 PROCEDURE — 77417 THER RADIOLOGY PORT IMAGE(S): CPT | Performed by: RADIOLOGY

## 2020-11-16 PROCEDURE — 77427 RADIATION TX MANAGEMENT X5: CPT | Performed by: RADIOLOGY

## 2020-11-17 ENCOUNTER — HOSPITAL ENCOUNTER (OUTPATIENT)
Dept: RADIATION ONCOLOGY | Facility: MEDICAL CENTER | Age: 50
End: 2020-11-17
Payer: MEDICAID

## 2020-11-17 LAB
CHEMOTHERAPY INFUSION START DATE: NORMAL
CHEMOTHERAPY RECORDS: 0.32
CHEMOTHERAPY RECORDS: 1.8
CHEMOTHERAPY RECORDS: 1.8
CHEMOTHERAPY RECORDS: 5040
CHEMOTHERAPY RECORDS: 5040
CHEMOTHERAPY RECORDS: 896
CHEMOTHERAPY RECORDS: NORMAL
CHEMOTHERAPY RX CANCER: NORMAL
DATE 1ST CHEMO CANCER: NORMAL
RAD ONC ARIA COURSE LAST TREATMENT DATE: NORMAL
RAD ONC ARIA COURSE TREATMENT ELAPSED DAYS: NORMAL
RAD ONC ARIA REFERENCE POINT DOSAGE GIVEN TO DATE: 37.8
RAD ONC ARIA REFERENCE POINT DOSAGE GIVEN TO DATE: 6.1
RAD ONC ARIA REFERENCE POINT DOSAGE GIVEN TO DATE: 6.72
RAD ONC ARIA REFERENCE POINT ID: NORMAL
RAD ONC ARIA REFERENCE POINT SESSION DOSAGE GIVEN: 0.29
RAD ONC ARIA REFERENCE POINT SESSION DOSAGE GIVEN: 0.32
RAD ONC ARIA REFERENCE POINT SESSION DOSAGE GIVEN: 1.8

## 2020-11-17 PROCEDURE — 77412 RADIATION TX DELIVERY LVL 3: CPT | Performed by: RADIOLOGY

## 2020-11-17 PROCEDURE — 77417 THER RADIOLOGY PORT IMAGE(S): CPT | Performed by: RADIOLOGY

## 2020-11-17 NOTE — DOCUMENTATION QUERY
[facility]                                                                       Query Response Note      PATIENT:               KELLY TREJO  ACCT #:                  [id number]  MRN:                     [id number]  :                      1970  ADMIT DATE:       2020 9:07 PM  DISCH DATE:        2020 12:33 PM  RESPONDING  PROVIDER #:        [id number]           QUERY TEXT:    The patient was admitted for COVID-19, pneumonia, and sepsis. The source of the sepsis is not clearly identified.  Please clarify  the source of sepsis if known.    *If an appropriate response is not listed below, please respond with a new note:      The patient's Clinical Indicators include:  Sepsis was confirmed as present on admission per query from .     H&P:  She met sepsis criteria with fever in , tachycardia 112, neutropenia with ANC 1100    Progress notes : Pneumonia due to COVID-19    Progress notes : The patient is still neutropenic the patient at this point will need to continue on Maxipime for antibiotic management.  Options provided:   -- Sepsis is due to or associated with COVID-19   -- Sepsis has another source- please document   -- Unable to determine source of sepsis      Query created by: Amy Nelson on 11/10/2020 11:18 AM    RESPONSE TEXT:    Sepsis is due to or associated with COVID-19          Electronically signed by:  JEROME TRIVEDI MD 2020 11:58 AM

## 2020-11-18 ENCOUNTER — HOSPITAL ENCOUNTER (OUTPATIENT)
Dept: RADIATION ONCOLOGY | Facility: MEDICAL CENTER | Age: 50
End: 2020-11-18
Payer: MEDICAID

## 2020-11-18 ENCOUNTER — HOSPITAL ENCOUNTER (OUTPATIENT)
Dept: RADIATION ONCOLOGY | Facility: MEDICAL CENTER | Age: 50
End: 2020-11-30
Attending: RADIOLOGY

## 2020-11-18 VITALS
SYSTOLIC BLOOD PRESSURE: 133 MMHG | TEMPERATURE: 97.3 F | HEART RATE: 82 BPM | OXYGEN SATURATION: 97 % | DIASTOLIC BLOOD PRESSURE: 90 MMHG

## 2020-11-18 LAB
CHEMOTHERAPY INFUSION START DATE: NORMAL
CHEMOTHERAPY RECORDS: 0.32
CHEMOTHERAPY RECORDS: 1.8
CHEMOTHERAPY RECORDS: 1.8
CHEMOTHERAPY RECORDS: 5040
CHEMOTHERAPY RECORDS: 5040
CHEMOTHERAPY RECORDS: 896
CHEMOTHERAPY RECORDS: NORMAL
CHEMOTHERAPY RX CANCER: NORMAL
DATE 1ST CHEMO CANCER: NORMAL
RAD ONC ARIA COURSE LAST TREATMENT DATE: NORMAL
RAD ONC ARIA COURSE TREATMENT ELAPSED DAYS: NORMAL
RAD ONC ARIA REFERENCE POINT DOSAGE GIVEN TO DATE: 39.6
RAD ONC ARIA REFERENCE POINT DOSAGE GIVEN TO DATE: 6.39
RAD ONC ARIA REFERENCE POINT DOSAGE GIVEN TO DATE: 7.04
RAD ONC ARIA REFERENCE POINT ID: NORMAL
RAD ONC ARIA REFERENCE POINT SESSION DOSAGE GIVEN: 0.29
RAD ONC ARIA REFERENCE POINT SESSION DOSAGE GIVEN: 0.32
RAD ONC ARIA REFERENCE POINT SESSION DOSAGE GIVEN: 1.8

## 2020-11-18 PROCEDURE — 77417 THER RADIOLOGY PORT IMAGE(S): CPT | Performed by: RADIOLOGY

## 2020-11-18 PROCEDURE — 77412 RADIATION TX DELIVERY LVL 3: CPT | Performed by: RADIOLOGY

## 2020-11-18 ASSESSMENT — PAIN SCALES - GENERAL: PAINLEVEL: NO PAIN

## 2020-11-18 NOTE — ON TREATMENT VISIT
ON TREATMENT NOTE  RADIATION ONCOLOGY DEPARTMENT    Patient name:  Zahida Rodriguez    Primary Physician:  AKBAR Henry MRN: [id number]  CSN: [id number]   Referring physician:  Codie Carreno M.D. : 1970, 50 y.o.     ENCOUNTER DATE:  20    DIAGNOSIS:    Malignant neoplasm of upper-outer quadrant of right breast in female, estrogen receptor positive (HCC)  Staging form: Breast, AJCC 8th Edition  - Pathologic: Stage IIIA (pT2, pN3a, cM0, G2, ER+, PA+, HER2-) - Signed by Amy Kahn M.D. on 2020  Stage prefix: Initial diagnosis  Method of lymph node assessment: Axillary lymph node dissection  Multigene prognostic tests performed: None  Histologic grading system: 3 grade system  Laterality: Right      TREATMENT SUMMARY:  Radiation Treatments     Active   Plans   R CW   Most recent treatment: Dose planned: 180 cGy (fraction 22 of 28 on 2020)   Total: Dose planned: 5,040 cGy   Elapsed Days: 30 @ [id number]      R PAB   Most recent treatment: Dose planned: 32 cGy (fraction 22 of 28 on 2020)   Total: Dose planned: 896 cGy   Elapsed Days: 30 @ [id number]      R SCV   Most recent treatment: Dose planned: 180 cGy (fraction 22 of 28 on 2020)   Total: Dose planned: 5,040 cGy   Elapsed Days: 30 @ [id number]      Reference Points   R CW   Most recent treatment: Dose given: 180 cGy (on 2020)   Total: Dose given: 3,960 cGy   Elapsed Days: 30 @ [id number]      R CW CP   Most recent treatment: Dose given: 180 cGy (on 2020)   Total: Dose given: 3,960 cGy   Elapsed Days: 30 @ [id number]      R SCV   Most recent treatment: Dose given: 180 cGy (on 2020)   Total: Dose given: 3,960 cGy   Elapsed Days: 30 @ [id number]      R SCV CP   Most recent treatment: Dose given: 180 cGy (on 2020)   Total: Dose given: 3,960 cGy   Elapsed Days: 30 @ [id number]      Rt PAB   Most recent treatment: Dose given: 32 cGy (on 2020)   Total: Dose  given: 704 cGy   Elapsed Days: 30 @ [id number]      Rt PAB CP   Most recent treatment: Dose given: 29 cGy (on 11/18/2020)   Total: Dose given: 639 cGy   Elapsed Days: 30 @ [id number]                    SUBJECTIVE:   Doing well feeling quite good slight fatigue.      VITAL SIGNS:  /90 (BP Location: Left arm, Patient Position: Sitting, BP Cuff Size: Adult)   Pulse 82   Temp 36.3 °C (97.3 °F) (Temporal)   SpO2 97%    Encounter Vitals  Temperature: 36.3 °C (97.3 °F)  Temp src: Temporal  Blood Pressure: 133/90  Pulse: 82  Pulse Oximetry: 97 %  Pain Score: No pain  Pain Assessment 11/18/2020 11/11/2020 11/4/2020   Pain Assessment Denies Pain Denies Pain Denies Pain   Pain Score 0 0 1   Pain Loc - - Breast   Some recent data might be hidden          PHYSICAL EXAM:    Mild erythema in the treatment field    Toxicity Assessment 11/18/2020 11/11/2020 11/4/2020 10/28/2020 10/21/2020   Toxicity Assessment Breast Breast Breast Breast Breast   Fatigue (lethargy, malaise, asthenia) Increased fatigue over baseline, but not altering normal activities Increased fatigue over baseline, but not altering normal activities Increased fatigue over baseline, but not altering normal activities Increased fatigue over baseline, but not altering normal activities None   Fever (in the absence of neutropenia) None None None None None   Radiation Dermatitis Faint erythema or dry desquamation Faint erythema or dry desquamation Faint erythema or dry desquamation Faint erythema or dry desquamation None   Lymphatics Normal Normal Normal Normal Normal   RT - Pain due to RT None None Mild pain not interfering with function None None   Dyspnea Normal Normal Normal Normal Normal         IMPRESSION:  Cancer Staging  Malignant neoplasm of upper-outer quadrant of right breast in female, estrogen receptor positive (HCC)  Staging form: Breast, AJCC 8th Edition  - Pathologic: Stage IIIA (pT2, pN3a, cM0, G2, ER+, ID+, HER2-) - Signed by Amy RUCKER  KATHRYN Kahn on 4/21/2020      PLAN:  No change in treatment plan    Disposition:  Treatment plan reviewed. Questions answered. Continue therapy outlined.     Amy Kahn M.D.    No orders of the defined types were placed in this encounter.

## 2020-11-19 ENCOUNTER — HOSPITAL ENCOUNTER (OUTPATIENT)
Dept: RADIATION ONCOLOGY | Facility: MEDICAL CENTER | Age: 50
End: 2020-11-19

## 2020-11-19 LAB
CHEMOTHERAPY INFUSION START DATE: NORMAL
CHEMOTHERAPY RECORDS: 0.32
CHEMOTHERAPY RECORDS: 1.8
CHEMOTHERAPY RECORDS: 1.8
CHEMOTHERAPY RECORDS: 5040
CHEMOTHERAPY RECORDS: 5040
CHEMOTHERAPY RECORDS: 896
CHEMOTHERAPY RECORDS: NORMAL
CHEMOTHERAPY RX CANCER: NORMAL
DATE 1ST CHEMO CANCER: NORMAL
RAD ONC ARIA COURSE LAST TREATMENT DATE: NORMAL
RAD ONC ARIA COURSE TREATMENT ELAPSED DAYS: NORMAL
RAD ONC ARIA REFERENCE POINT DOSAGE GIVEN TO DATE: 41.4
RAD ONC ARIA REFERENCE POINT DOSAGE GIVEN TO DATE: 6.68
RAD ONC ARIA REFERENCE POINT DOSAGE GIVEN TO DATE: 7.36
RAD ONC ARIA REFERENCE POINT ID: NORMAL
RAD ONC ARIA REFERENCE POINT SESSION DOSAGE GIVEN: 0.29
RAD ONC ARIA REFERENCE POINT SESSION DOSAGE GIVEN: 0.32
RAD ONC ARIA REFERENCE POINT SESSION DOSAGE GIVEN: 1.8

## 2020-11-19 PROCEDURE — 77412 RADIATION TX DELIVERY LVL 3: CPT | Performed by: RADIOLOGY

## 2020-11-19 PROCEDURE — 77336 RADIATION PHYSICS CONSULT: CPT | Performed by: RADIOLOGY

## 2020-11-19 PROCEDURE — 77417 THER RADIOLOGY PORT IMAGE(S): CPT | Performed by: RADIOLOGY

## 2020-11-20 ENCOUNTER — HOSPITAL ENCOUNTER (OUTPATIENT)
Dept: RADIATION ONCOLOGY | Facility: MEDICAL CENTER | Age: 50
End: 2020-11-20

## 2020-11-20 LAB
CHEMOTHERAPY INFUSION START DATE: NORMAL
CHEMOTHERAPY RECORDS: 0.32
CHEMOTHERAPY RECORDS: 1.8
CHEMOTHERAPY RECORDS: 1.8
CHEMOTHERAPY RECORDS: 5040
CHEMOTHERAPY RECORDS: 5040
CHEMOTHERAPY RECORDS: 896
CHEMOTHERAPY RECORDS: NORMAL
CHEMOTHERAPY RX CANCER: NORMAL
DATE 1ST CHEMO CANCER: NORMAL
RAD ONC ARIA COURSE LAST TREATMENT DATE: NORMAL
RAD ONC ARIA COURSE TREATMENT ELAPSED DAYS: NORMAL
RAD ONC ARIA REFERENCE POINT DOSAGE GIVEN TO DATE: 43.2
RAD ONC ARIA REFERENCE POINT DOSAGE GIVEN TO DATE: 6.97
RAD ONC ARIA REFERENCE POINT DOSAGE GIVEN TO DATE: 7.68
RAD ONC ARIA REFERENCE POINT ID: NORMAL
RAD ONC ARIA REFERENCE POINT SESSION DOSAGE GIVEN: 0.29
RAD ONC ARIA REFERENCE POINT SESSION DOSAGE GIVEN: 0.32
RAD ONC ARIA REFERENCE POINT SESSION DOSAGE GIVEN: 1.8

## 2020-11-20 PROCEDURE — 77417 THER RADIOLOGY PORT IMAGE(S): CPT | Performed by: RADIOLOGY

## 2020-11-20 PROCEDURE — 77412 RADIATION TX DELIVERY LVL 3: CPT | Performed by: RADIOLOGY

## 2020-11-22 ENCOUNTER — HOSPITAL ENCOUNTER (OUTPATIENT)
Dept: RADIATION ONCOLOGY | Facility: MEDICAL CENTER | Age: 50
End: 2020-11-22

## 2020-11-22 LAB
CHEMOTHERAPY INFUSION START DATE: NORMAL
CHEMOTHERAPY RECORDS: 0.32
CHEMOTHERAPY RECORDS: 1.8
CHEMOTHERAPY RECORDS: 1.8
CHEMOTHERAPY RECORDS: 5040
CHEMOTHERAPY RECORDS: 5040
CHEMOTHERAPY RECORDS: 896
CHEMOTHERAPY RECORDS: NORMAL
CHEMOTHERAPY RX CANCER: NORMAL
DATE 1ST CHEMO CANCER: NORMAL
RAD ONC ARIA COURSE LAST TREATMENT DATE: NORMAL
RAD ONC ARIA COURSE TREATMENT ELAPSED DAYS: NORMAL
RAD ONC ARIA REFERENCE POINT DOSAGE GIVEN TO DATE: 45
RAD ONC ARIA REFERENCE POINT DOSAGE GIVEN TO DATE: 7.26
RAD ONC ARIA REFERENCE POINT DOSAGE GIVEN TO DATE: 8
RAD ONC ARIA REFERENCE POINT ID: NORMAL
RAD ONC ARIA REFERENCE POINT SESSION DOSAGE GIVEN: 0.29
RAD ONC ARIA REFERENCE POINT SESSION DOSAGE GIVEN: 0.32
RAD ONC ARIA REFERENCE POINT SESSION DOSAGE GIVEN: 1.8

## 2020-11-22 PROCEDURE — 77417 THER RADIOLOGY PORT IMAGE(S): CPT | Performed by: RADIOLOGY

## 2020-11-22 PROCEDURE — 77412 RADIATION TX DELIVERY LVL 3: CPT | Performed by: RADIOLOGY

## 2020-11-22 PROCEDURE — 77427 RADIATION TX MANAGEMENT X5: CPT | Performed by: RADIOLOGY

## 2020-11-23 ENCOUNTER — HOSPITAL ENCOUNTER (OUTPATIENT)
Dept: RADIATION ONCOLOGY | Facility: MEDICAL CENTER | Age: 50
End: 2020-11-23

## 2020-11-23 LAB
CHEMOTHERAPY INFUSION START DATE: NORMAL
CHEMOTHERAPY RECORDS: 0.32
CHEMOTHERAPY RECORDS: 1.8
CHEMOTHERAPY RECORDS: 1.8
CHEMOTHERAPY RECORDS: 5040
CHEMOTHERAPY RECORDS: 5040
CHEMOTHERAPY RECORDS: 896
CHEMOTHERAPY RECORDS: NORMAL
CHEMOTHERAPY RX CANCER: NORMAL
DATE 1ST CHEMO CANCER: NORMAL
RAD ONC ARIA COURSE LAST TREATMENT DATE: NORMAL
RAD ONC ARIA COURSE TREATMENT ELAPSED DAYS: NORMAL
RAD ONC ARIA REFERENCE POINT DOSAGE GIVEN TO DATE: 46.8
RAD ONC ARIA REFERENCE POINT DOSAGE GIVEN TO DATE: 7.55
RAD ONC ARIA REFERENCE POINT DOSAGE GIVEN TO DATE: 8.32
RAD ONC ARIA REFERENCE POINT ID: NORMAL
RAD ONC ARIA REFERENCE POINT SESSION DOSAGE GIVEN: 0.29
RAD ONC ARIA REFERENCE POINT SESSION DOSAGE GIVEN: 0.32
RAD ONC ARIA REFERENCE POINT SESSION DOSAGE GIVEN: 1.8

## 2020-11-23 PROCEDURE — 77417 THER RADIOLOGY PORT IMAGE(S): CPT | Performed by: RADIOLOGY

## 2020-11-23 PROCEDURE — 77412 RADIATION TX DELIVERY LVL 3: CPT | Performed by: RADIOLOGY

## 2020-11-24 ENCOUNTER — HOSPITAL ENCOUNTER (OUTPATIENT)
Dept: RADIATION ONCOLOGY | Facility: MEDICAL CENTER | Age: 50
End: 2020-11-30
Attending: RADIOLOGY

## 2020-11-24 ENCOUNTER — HOSPITAL ENCOUNTER (OUTPATIENT)
Dept: RADIATION ONCOLOGY | Facility: MEDICAL CENTER | Age: 50
End: 2020-11-24

## 2020-11-24 VITALS
DIASTOLIC BLOOD PRESSURE: 72 MMHG | OXYGEN SATURATION: 94 % | TEMPERATURE: 96.9 F | SYSTOLIC BLOOD PRESSURE: 116 MMHG | HEART RATE: 86 BPM

## 2020-11-24 LAB
CHEMOTHERAPY INFUSION START DATE: NORMAL
CHEMOTHERAPY RECORDS: 0.32
CHEMOTHERAPY RECORDS: 1.8
CHEMOTHERAPY RECORDS: 1.8
CHEMOTHERAPY RECORDS: 5040
CHEMOTHERAPY RECORDS: 5040
CHEMOTHERAPY RECORDS: 896
CHEMOTHERAPY RECORDS: NORMAL
CHEMOTHERAPY RX CANCER: NORMAL
DATE 1ST CHEMO CANCER: NORMAL
RAD ONC ARIA COURSE LAST TREATMENT DATE: NORMAL
RAD ONC ARIA COURSE TREATMENT ELAPSED DAYS: NORMAL
RAD ONC ARIA REFERENCE POINT DOSAGE GIVEN TO DATE: 48.6
RAD ONC ARIA REFERENCE POINT DOSAGE GIVEN TO DATE: 7.84
RAD ONC ARIA REFERENCE POINT DOSAGE GIVEN TO DATE: 8.64
RAD ONC ARIA REFERENCE POINT ID: NORMAL
RAD ONC ARIA REFERENCE POINT SESSION DOSAGE GIVEN: 0.29
RAD ONC ARIA REFERENCE POINT SESSION DOSAGE GIVEN: 0.32
RAD ONC ARIA REFERENCE POINT SESSION DOSAGE GIVEN: 1.8

## 2020-11-24 PROCEDURE — 77334 RADIATION TREATMENT AID(S): CPT | Performed by: RADIOLOGY

## 2020-11-24 PROCEDURE — 77307 TELETHX ISODOSE PLAN CPLX: CPT | Mod: 26 | Performed by: RADIOLOGY

## 2020-11-24 PROCEDURE — 77412 RADIATION TX DELIVERY LVL 3: CPT | Performed by: RADIOLOGY

## 2020-11-24 PROCEDURE — 77417 THER RADIOLOGY PORT IMAGE(S): CPT | Performed by: RADIOLOGY

## 2020-11-24 PROCEDURE — 77334 RADIATION TREATMENT AID(S): CPT | Mod: 26 | Performed by: RADIOLOGY

## 2020-11-24 PROCEDURE — 77332 RADIATION TREATMENT AID(S): CPT | Mod: XU | Performed by: RADIOLOGY

## 2020-11-24 PROCEDURE — 77307 TELETHX ISODOSE PLAN CPLX: CPT | Performed by: RADIOLOGY

## 2020-11-24 ASSESSMENT — PAIN SCALES - GENERAL: PAINLEVEL: NO PAIN

## 2020-11-24 NOTE — ON TREATMENT VISIT
ON TREATMENT NOTE  RADIATION ONCOLOGY DEPARTMENT    Patient name:  Zahida Rodriguez    Primary Physician:  AKBAR Henry MRN: [id number]  CSN: [id number]   Referring physician:  Codie Carreno M.D. : 1970, 50 y.o.     ENCOUNTER DATE:  20    DIAGNOSIS:    Malignant neoplasm of upper-outer quadrant of right breast in female, estrogen receptor positive (HCC)  Staging form: Breast, AJCC 8th Edition  - Pathologic: Stage IIIA (pT2, pN3a, cM0, G2, ER+, GA+, HER2-) - Signed by Amy Kahn M.D. on 2020  Stage prefix: Initial diagnosis  Method of lymph node assessment: Axillary lymph node dissection  Multigene prognostic tests performed: None  Histologic grading system: 3 grade system  Laterality: Right      TREATMENT SUMMARY:  Radiation Treatments     Active   Plans   R CW   Most recent treatment: Dose planned: 180 cGy (fraction 27 of 28 on 2020)   Total: Dose planned: 5,040 cGy   Elapsed Days: 36 @ [id number]      R PAB   Most recent treatment: Dose planned: 32 cGy (fraction 27 of 28 on 2020)   Total: Dose planned: 896 cGy   Elapsed Days: 36 @ [id number]      R SCV   Most recent treatment: Dose planned: 180 cGy (fraction 27 of 28 on 2020)   Total: Dose planned: 5,040 cGy   Elapsed Days: 36 @ [id number]      Reference Points   R CW   Most recent treatment: Dose given: 180 cGy (on 2020)   Total: Dose given: 4,860 cGy   Elapsed Days: 36 @ [id number]      R CW CP   Most recent treatment: Dose given: 180 cGy (on 2020)   Total: Dose given: 4,860 cGy   Elapsed Days: 36 @ [id number]      R SCV   Most recent treatment: Dose given: 180 cGy (on 2020)   Total: Dose given: 4,860 cGy   Elapsed Days: 36 @ [id number]      R SCV CP   Most recent treatment: Dose given: 180 cGy (on 2020)   Total: Dose given: 4,860 cGy   Elapsed Days: 36 @ [id number]      Rt PAB   Most recent treatment: Dose given: 32 cGy (on 2020)   Total: Dose  given: 864 cGy   Elapsed Days: 36 @ [id number]      Rt PAB CP   Most recent treatment: Dose given: 29 cGy (on 11/24/2020)   Total: Dose given: 784 cGy   Elapsed Days: 36 @ [id number]                    SUBJECTIVE:   Doing well no new complaints just a little bit of soreness in the axilla      VITAL SIGNS:  /72 (BP Location: Left arm, Patient Position: Sitting, BP Cuff Size: Adult)   Pulse 86   Temp 36.1 °C (96.9 °F) (Temporal)   SpO2 94%    Encounter Vitals  Temperature: 36.1 °C (96.9 °F)  Temp src: Temporal  Blood Pressure: 116/72  Pulse: 86  Pulse Oximetry: 94 %  Pain Score: No pain  Pain Assessment 11/24/2020 11/18/2020 11/11/2020   Pain Assessment Denies Pain Denies Pain Denies Pain   Pain Score 0 0 0   Some recent data might be hidden          PHYSICAL EXAM:    Hyperpigmentation in the treatment field    Toxicity Assessment 11/24/2020 11/18/2020 11/11/2020 11/4/2020 10/28/2020 10/21/2020   Toxicity Assessment Breast Breast Breast Breast Breast Breast   Fatigue (lethargy, malaise, asthenia) Increased fatigue over baseline, but not altering normal activities Increased fatigue over baseline, but not altering normal activities Increased fatigue over baseline, but not altering normal activities Increased fatigue over baseline, but not altering normal activities Increased fatigue over baseline, but not altering normal activities None   Fever (in the absence of neutropenia) None None None None None None   Radiation Dermatitis Faint erythema or dry desquamation Faint erythema or dry desquamation Faint erythema or dry desquamation Faint erythema or dry desquamation Faint erythema or dry desquamation None   Lymphatics Normal Normal Normal Normal Normal Normal   RT - Pain due to RT None None None Mild pain not interfering with function None None   Dyspnea Normal Normal Normal Normal Normal Normal         IMPRESSION:  Cancer Staging  Malignant neoplasm of upper-outer quadrant of right breast in female,  estrogen receptor positive (HCC)  Staging form: Breast, AJCC 8th Edition  - Pathologic: Stage IIIA (pT2, pN3a, cM0, G2, ER+, SC+, HER2-) - Signed by Amy Kahn M.D. on 4/21/2020      PLAN:  No change in treatment plan    Disposition:  Treatment plan reviewed. Questions answered. Continue therapy outlined.     Amy Kahn M.D.    No orders of the defined types were placed in this encounter.

## 2020-11-25 ENCOUNTER — HOSPITAL ENCOUNTER (OUTPATIENT)
Dept: RADIATION ONCOLOGY | Facility: MEDICAL CENTER | Age: 50
End: 2020-11-25

## 2020-11-25 LAB
CHEMOTHERAPY INFUSION START DATE: NORMAL
CHEMOTHERAPY RECORDS: 0.32
CHEMOTHERAPY RECORDS: 1.8
CHEMOTHERAPY RECORDS: 1.8
CHEMOTHERAPY RECORDS: 5040
CHEMOTHERAPY RECORDS: 5040
CHEMOTHERAPY RECORDS: 896
CHEMOTHERAPY RECORDS: NORMAL
CHEMOTHERAPY RX CANCER: NORMAL
DATE 1ST CHEMO CANCER: NORMAL
RAD ONC ARIA COURSE LAST TREATMENT DATE: NORMAL
RAD ONC ARIA COURSE TREATMENT ELAPSED DAYS: NORMAL
RAD ONC ARIA REFERENCE POINT DOSAGE GIVEN TO DATE: 50.4
RAD ONC ARIA REFERENCE POINT DOSAGE GIVEN TO DATE: 8.13
RAD ONC ARIA REFERENCE POINT DOSAGE GIVEN TO DATE: 8.96
RAD ONC ARIA REFERENCE POINT ID: NORMAL
RAD ONC ARIA REFERENCE POINT SESSION DOSAGE GIVEN: 0.29
RAD ONC ARIA REFERENCE POINT SESSION DOSAGE GIVEN: 0.32
RAD ONC ARIA REFERENCE POINT SESSION DOSAGE GIVEN: 1.8

## 2020-11-25 PROCEDURE — 77336 RADIATION PHYSICS CONSULT: CPT | Performed by: RADIOLOGY

## 2020-11-25 PROCEDURE — 77417 THER RADIOLOGY PORT IMAGE(S): CPT | Performed by: RADIOLOGY

## 2020-11-25 PROCEDURE — 77412 RADIATION TX DELIVERY LVL 3: CPT | Performed by: RADIOLOGY

## 2020-11-30 ENCOUNTER — HOSPITAL ENCOUNTER (OUTPATIENT)
Dept: RADIATION ONCOLOGY | Facility: MEDICAL CENTER | Age: 50
End: 2020-11-30

## 2020-11-30 ENCOUNTER — HOSPITAL ENCOUNTER (OUTPATIENT)
Dept: RADIATION ONCOLOGY | Facility: MEDICAL CENTER | Age: 50
End: 2020-11-30
Attending: RADIOLOGY

## 2020-11-30 VITALS
OXYGEN SATURATION: 97 % | DIASTOLIC BLOOD PRESSURE: 76 MMHG | HEART RATE: 77 BPM | SYSTOLIC BLOOD PRESSURE: 146 MMHG | TEMPERATURE: 96.6 F

## 2020-11-30 DIAGNOSIS — Z17.0 MALIGNANT NEOPLASM OF UPPER-OUTER QUADRANT OF RIGHT BREAST IN FEMALE, ESTROGEN RECEPTOR POSITIVE (HCC): ICD-10-CM

## 2020-11-30 DIAGNOSIS — C50.411 MALIGNANT NEOPLASM OF UPPER-OUTER QUADRANT OF RIGHT BREAST IN FEMALE, ESTROGEN RECEPTOR POSITIVE (HCC): ICD-10-CM

## 2020-11-30 LAB
CHEMOTHERAPY INFUSION START DATE: NORMAL
CHEMOTHERAPY RECORDS: 1000
CHEMOTHERAPY RECORDS: 2
CHEMOTHERAPY RECORDS: NORMAL
CHEMOTHERAPY RX CANCER: NORMAL
DATE 1ST CHEMO CANCER: NORMAL
RAD ONC ARIA COURSE LAST TREATMENT DATE: NORMAL
RAD ONC ARIA COURSE TREATMENT ELAPSED DAYS: NORMAL
RAD ONC ARIA REFERENCE POINT DOSAGE GIVEN TO DATE: 2
RAD ONC ARIA REFERENCE POINT DOSAGE GIVEN TO DATE: 2.2
RAD ONC ARIA REFERENCE POINT ID: NORMAL
RAD ONC ARIA REFERENCE POINT ID: NORMAL
RAD ONC ARIA REFERENCE POINT SESSION DOSAGE GIVEN: 2
RAD ONC ARIA REFERENCE POINT SESSION DOSAGE GIVEN: 2.2

## 2020-11-30 PROCEDURE — 77412 RADIATION TX DELIVERY LVL 3: CPT | Performed by: RADIOLOGY

## 2020-11-30 PROCEDURE — 77280 THER RAD SIMULAJ FIELD SMPL: CPT | Performed by: RADIOLOGY

## 2020-11-30 ASSESSMENT — PAIN SCALES - GENERAL: PAINLEVEL: 8=MODERATE-SEVERE PAIN

## 2020-11-30 NOTE — ON TREATMENT VISIT
ON TREATMENT NOTE  RADIATION ONCOLOGY DEPARTMENT    Patient name:  Zahida Rodriguez    Primary Physician:  AKBAR Henry MRN: [id number]  CSN: [id number]   Referring physician:  Codie Carreno M.D. : 1970, 50 y.o.     ENCOUNTER DATE:  20    DIAGNOSIS:    Malignant neoplasm of upper-outer quadrant of right breast in female, estrogen receptor positive (HCC)  Staging form: Breast, AJCC 8th Edition  - Pathologic: Stage IIIA (pT2, pN3a, cM0, G2, ER+, IA+, HER2-) - Signed by Amy Kahn M.D. on 2020  Stage prefix: Initial diagnosis  Method of lymph node assessment: Axillary lymph node dissection  Multigene prognostic tests performed: None  Histologic grading system: 3 grade system  Laterality: Right      TREATMENT SUMMARY:  Radiation Treatments     Active   Plans   R CW   Most recent treatment: Dose planned: 180 cGy (fraction 28 of 28 on 2020)   Total: Dose planned: 5,040 cGy   Elapsed Days: 37 @ [id number]      R CW Scar Bst   Most recent treatment: Dose planned: 200 cGy (fraction 1 of 5 on 2020)   Total: Dose planned: 1,000 cGy   Elapsed Days: 42 @ [id number]      R PAB   Most recent treatment: Dose planned: 32 cGy (fraction 28 of 28 on 2020)   Total: Dose planned: 896 cGy   Elapsed Days: 37 @ [id number]      R SCV   Most recent treatment: Dose planned: 180 cGy (fraction 28 of 28 on 2020)   Total: Dose planned: 5,040 cGy   Elapsed Days: 37 @ [id number]      Reference Points   R CW   Most recent treatment: Dose given: 180 cGy (on 2020)   Total: Dose given: 5,040 cGy   Elapsed Days: 37 @ [id number]      R CW CP   Most recent treatment: Dose given: 180 cGy (on 2020)   Total: Dose given: 5,040 cGy   Elapsed Days: 37 @ [id number]      R CW Scar Bst   Most recent treatment: Dose given: 200 cGy (on 2020)   Total: Dose given: 200 cGy   Elapsed Days: 42 @ [id number]      R CW Scar Bst CP   Most recent treatment: Dose given:  220 cGy (on 11/30/2020)   Total: Dose given: 220 cGy   Elapsed Days: 42 @ [id number]      R SCV   Most recent treatment: Dose given: 180 cGy (on 11/25/2020)   Total: Dose given: 5,040 cGy   Elapsed Days: 37 @ [id number]      R SCV CP   Most recent treatment: Dose given: 180 cGy (on 11/25/2020)   Total: Dose given: 5,040 cGy   Elapsed Days: 37 @ [id number]      Rt PAB   Most recent treatment: Dose given: 32 cGy (on 11/25/2020)   Total: Dose given: 896 cGy   Elapsed Days: 37 @ [id number]      Rt PAB CP   Most recent treatment: Dose given: 29 cGy (on 11/25/2020)   Total: Dose given: 813 cGy   Elapsed Days: 37 @ [id number]                    SUBJECTIVE:   Patient has developed moist desquamation in the supra clavicular fossa and some discomfort in the right axillary region.      VITAL SIGNS:  /76 (BP Location: Right arm, Patient Position: Sitting, BP Cuff Size: Adult)   Pulse 77   Temp 35.9 °C (96.6 °F) (Temporal)   SpO2 97%    Encounter Vitals  Temperature: 35.9 °C (96.6 °F)  Temp src: Temporal  Blood Pressure: 146/76  Pulse: 77  Pulse Oximetry: 97 %  Pain Score: 8=Moderate-Severe Pain  Pain Assessment 11/30/2020 11/24/2020   Pain Assessment Acute Pain Denies Pain   Pain Score 8 0   Pain Loc Breast -   Describe pain Burning -   Some recent data might be hidden          PHYSICAL EXAM:  Brisk erythematous reaction in the treatment field hyperpigmentation in the right axilla no sign of skin breakdown.  There is a quarter size area of moist desquamation in the supraclavicular fossa.      Toxicity Assessment 11/30/2020 11/24/2020 11/18/2020 11/11/2020 11/4/2020 10/28/2020 10/21/2020   Toxicity Assessment Breast Breast Breast Breast Breast Breast Breast   Fatigue (lethargy, malaise, asthenia) Increased fatigue over baseline, but not altering normal activities Increased fatigue over baseline, but not altering normal activities Increased fatigue over baseline, but not altering normal activities Increased  fatigue over baseline, but not altering normal activities Increased fatigue over baseline, but not altering normal activities Increased fatigue over baseline, but not altering normal activities None   Fever (in the absence of neutropenia) None None None None None None None   Radiation Dermatitis Moderate to brisk erythema or a patchy moist desquamation, mostly confined to skin folds and creases, with/without moderate edema Faint erythema or dry desquamation Faint erythema or dry desquamation Faint erythema or dry desquamation Faint erythema or dry desquamation Faint erythema or dry desquamation None   Lymphatics Normal Normal Normal Normal Normal Normal Normal   RT - Pain due to RT Mild pain not interfering with function None None None Mild pain not interfering with function None None   Dyspnea Normal Normal Normal Normal Normal Normal Normal         IMPRESSION:  Cancer Staging  Malignant neoplasm of upper-outer quadrant of right breast in female, estrogen receptor positive (HCC)  Staging form: Breast, AJCC 8th Edition  - Pathologic: Stage IIIA (pT2, pN3a, cM0, G2, ER+, WI+, HER2-) - Signed by Amy Kahn M.D. on 4/21/2020      PLAN:  No change in treatment plan .  Given Silvadene cream    Disposition:  Treatment plan reviewed. Questions answered. Continue therapy outlined.     Amy Kahn M.D.    No orders of the defined types were placed in this encounter.

## 2020-12-01 ENCOUNTER — HOSPITAL ENCOUNTER (OUTPATIENT)
Dept: RADIATION ONCOLOGY | Facility: MEDICAL CENTER | Age: 50
End: 2020-12-01

## 2020-12-01 ENCOUNTER — HOSPITAL ENCOUNTER (OUTPATIENT)
Dept: RADIATION ONCOLOGY | Facility: MEDICAL CENTER | Age: 50
End: 2020-12-31
Attending: RADIOLOGY

## 2020-12-01 LAB
CHEMOTHERAPY INFUSION START DATE: NORMAL
CHEMOTHERAPY RECORDS: 1000
CHEMOTHERAPY RECORDS: 2
CHEMOTHERAPY RECORDS: NORMAL
CHEMOTHERAPY RX CANCER: NORMAL
DATE 1ST CHEMO CANCER: NORMAL
RAD ONC ARIA COURSE LAST TREATMENT DATE: NORMAL
RAD ONC ARIA COURSE TREATMENT ELAPSED DAYS: NORMAL
RAD ONC ARIA REFERENCE POINT DOSAGE GIVEN TO DATE: 4
RAD ONC ARIA REFERENCE POINT DOSAGE GIVEN TO DATE: 4.41
RAD ONC ARIA REFERENCE POINT ID: NORMAL
RAD ONC ARIA REFERENCE POINT ID: NORMAL
RAD ONC ARIA REFERENCE POINT SESSION DOSAGE GIVEN: 2
RAD ONC ARIA REFERENCE POINT SESSION DOSAGE GIVEN: 2.2

## 2020-12-01 PROCEDURE — 77427 RADIATION TX MANAGEMENT X5: CPT | Performed by: RADIOLOGY

## 2020-12-01 PROCEDURE — 77412 RADIATION TX DELIVERY LVL 3: CPT | Performed by: RADIOLOGY

## 2020-12-02 ENCOUNTER — HOSPITAL ENCOUNTER (OUTPATIENT)
Dept: RADIATION ONCOLOGY | Facility: MEDICAL CENTER | Age: 50
End: 2020-12-02

## 2020-12-02 ENCOUNTER — HOSPITAL ENCOUNTER (OUTPATIENT)
Dept: RADIATION ONCOLOGY | Facility: MEDICAL CENTER | Age: 50
End: 2020-12-31
Attending: RADIOLOGY

## 2020-12-02 VITALS
DIASTOLIC BLOOD PRESSURE: 72 MMHG | TEMPERATURE: 97.2 F | OXYGEN SATURATION: 97 % | HEART RATE: 79 BPM | SYSTOLIC BLOOD PRESSURE: 143 MMHG

## 2020-12-02 LAB
CHEMOTHERAPY INFUSION START DATE: NORMAL
CHEMOTHERAPY RECORDS: 1000
CHEMOTHERAPY RECORDS: 2
CHEMOTHERAPY RECORDS: NORMAL
CHEMOTHERAPY RX CANCER: NORMAL
DATE 1ST CHEMO CANCER: NORMAL
RAD ONC ARIA COURSE LAST TREATMENT DATE: NORMAL
RAD ONC ARIA COURSE TREATMENT ELAPSED DAYS: NORMAL
RAD ONC ARIA REFERENCE POINT DOSAGE GIVEN TO DATE: 6
RAD ONC ARIA REFERENCE POINT DOSAGE GIVEN TO DATE: 6.61
RAD ONC ARIA REFERENCE POINT ID: NORMAL
RAD ONC ARIA REFERENCE POINT ID: NORMAL
RAD ONC ARIA REFERENCE POINT SESSION DOSAGE GIVEN: 2
RAD ONC ARIA REFERENCE POINT SESSION DOSAGE GIVEN: 2.2

## 2020-12-02 PROCEDURE — 77412 RADIATION TX DELIVERY LVL 3: CPT | Performed by: RADIOLOGY

## 2020-12-02 ASSESSMENT — PAIN SCALES - GENERAL: PAINLEVEL: NO PAIN

## 2020-12-02 NOTE — ON TREATMENT VISIT
ON TREATMENT NOTE  RADIATION ONCOLOGY DEPARTMENT    Patient name:  Zahida Rodriguez    Primary Physician:  AKBAR Henry MRN: [id number]  CSN: [id number]   Referring physician:  Codie Carreno M.D. : 1970, 50 y.o.     ENCOUNTER DATE:  20    DIAGNOSIS:    Malignant neoplasm of upper-outer quadrant of right breast in female, estrogen receptor positive (HCC)  Staging form: Breast, AJCC 8th Edition  - Pathologic: Stage IIIA (pT2, pN3a, cM0, G2, ER+, MT+, HER2-) - Signed by Amy Kahn M.D. on 2020  Stage prefix: Initial diagnosis  Method of lymph node assessment: Axillary lymph node dissection  Multigene prognostic tests performed: None  Histologic grading system: 3 grade system  Laterality: Right      TREATMENT SUMMARY:  Radiation Treatments     Active   Plans   R CW   Most recent treatment: Dose planned: 180 cGy (fraction 28 of 28 on 2020)   Total: Dose planned: 5,040 cGy   Elapsed Days: 37 @ [id number]      R CW Scar Bst   Most recent treatment: Dose planned: 200 cGy (fraction 3 of 5 on 2020)   Total: Dose planned: 1,000 cGy   Elapsed Days: 44 @ [id number]      R PAB   Most recent treatment: Dose planned: 32 cGy (fraction 28 of 28 on 2020)   Total: Dose planned: 896 cGy   Elapsed Days: 37 @ [id number]      R SCV   Most recent treatment: Dose planned: 180 cGy (fraction 28 of 28 on 2020)   Total: Dose planned: 5,040 cGy   Elapsed Days: 37 @ [id number]      Reference Points   R CW   Most recent treatment: Dose given: 180 cGy (on 2020)   Total: Dose given: 5,040 cGy   Elapsed Days: 37 @ [id number]      R CW CP   Most recent treatment: Dose given: 180 cGy (on 2020)   Total: Dose given: 5,040 cGy   Elapsed Days: 37 @ [id number]      R CW Scar Bst   Most recent treatment: Dose given: 200 cGy (on 2020)   Total: Dose given: 600 cGy   Elapsed Days: 44 @ [id number]      R CW Scar Bst CP   Most recent treatment: Dose given:  220 cGy (on 12/2/2020)   Total: Dose given: 661 cGy   Elapsed Days: 44 @ [id number]      R SCV   Most recent treatment: Dose given: 180 cGy (on 11/25/2020)   Total: Dose given: 5,040 cGy   Elapsed Days: 37 @ [id number]      R SCV CP   Most recent treatment: Dose given: 180 cGy (on 11/25/2020)   Total: Dose given: 5,040 cGy   Elapsed Days: 37 @ [id number]      Rt PAB   Most recent treatment: Dose given: 32 cGy (on 11/25/2020)   Total: Dose given: 896 cGy   Elapsed Days: 37 @ [id number]      Rt PAB CP   Most recent treatment: Dose given: 29 cGy (on 11/25/2020)   Total: Dose given: 813 cGy   Elapsed Days: 37 @ [id number]                    SUBJECTIVE:   Doing well burn on supraclav is much better.  She is using Silvadene cream.      VITAL SIGNS:  /72 (BP Location: Left arm, Patient Position: Sitting, BP Cuff Size: Adult)   Pulse 79   Temp 36.2 °C (97.2 °F) (Temporal)   SpO2 97%    Encounter Vitals  Temperature: 36.2 °C (97.2 °F)  Temp src: Temporal  Blood Pressure: 143/72  Pulse: 79  Pulse Oximetry: 97 %  Pain Score: No pain  Pain Assessment 12/2/2020 11/30/2020   Pain Assessment Acute Pain Acute Pain   Pain Score 0 8   Pain Loc - Breast   Describe pain - Burning   Some recent data might be hidden          PHYSICAL EXAM:  Dry desquamation in the supraclavicular fossa marked improvement from Monday.  Still some hyperpigmentation in the axilla.      Toxicity Assessment 12/2/2020 11/30/2020 11/24/2020 11/18/2020 11/11/2020 11/4/2020 10/28/2020   Toxicity Assessment Breast Breast Breast Breast Breast Breast Breast   Fatigue (lethargy, malaise, asthenia) Increased fatigue over baseline, but not altering normal activities Increased fatigue over baseline, but not altering normal activities Increased fatigue over baseline, but not altering normal activities Increased fatigue over baseline, but not altering normal activities Increased fatigue over baseline, but not altering normal activities Increased fatigue  over baseline, but not altering normal activities Increased fatigue over baseline, but not altering normal activities   Fever (in the absence of neutropenia) None None None None None None None   Radiation Dermatitis Moderate to brisk erythema or a patchy moist desquamation, mostly confined to skin folds and creases, with/without moderate edema Moderate to brisk erythema or a patchy moist desquamation, mostly confined to skin folds and creases, with/without moderate edema Faint erythema or dry desquamation Faint erythema or dry desquamation Faint erythema or dry desquamation Faint erythema or dry desquamation Faint erythema or dry desquamation   Lymphatics Normal Normal Normal Normal Normal Normal Normal   RT - Pain due to RT None Mild pain not interfering with function None None None Mild pain not interfering with function None   Dyspnea Normal Normal Normal Normal Normal Normal Normal         IMPRESSION:  Cancer Staging  Malignant neoplasm of upper-outer quadrant of right breast in female, estrogen receptor positive (HCC)  Staging form: Breast, AJCC 8th Edition  - Pathologic: Stage IIIA (pT2, pN3a, cM0, G2, ER+, DC+, HER2-) - Signed by Amy Kahn M.D. on 4/21/2020      PLAN:  No change in treatment plan .  Continue with liberal use of lotions.    Disposition:  Treatment plan reviewed. Questions answered. Continue therapy outlined.     Amy Kahn M.D.    No orders of the defined types were placed in this encounter.

## 2020-12-03 ENCOUNTER — HOSPITAL ENCOUNTER (OUTPATIENT)
Dept: RADIATION ONCOLOGY | Facility: MEDICAL CENTER | Age: 50
End: 2020-12-03

## 2020-12-03 LAB
CHEMOTHERAPY INFUSION START DATE: NORMAL
CHEMOTHERAPY RECORDS: 1000
CHEMOTHERAPY RECORDS: 2
CHEMOTHERAPY RECORDS: NORMAL
CHEMOTHERAPY RX CANCER: NORMAL
DATE 1ST CHEMO CANCER: NORMAL
RAD ONC ARIA COURSE LAST TREATMENT DATE: NORMAL
RAD ONC ARIA COURSE TREATMENT ELAPSED DAYS: NORMAL
RAD ONC ARIA REFERENCE POINT DOSAGE GIVEN TO DATE: 8
RAD ONC ARIA REFERENCE POINT DOSAGE GIVEN TO DATE: 8.81
RAD ONC ARIA REFERENCE POINT ID: NORMAL
RAD ONC ARIA REFERENCE POINT ID: NORMAL
RAD ONC ARIA REFERENCE POINT SESSION DOSAGE GIVEN: 2
RAD ONC ARIA REFERENCE POINT SESSION DOSAGE GIVEN: 2.2

## 2020-12-03 PROCEDURE — 77412 RADIATION TX DELIVERY LVL 3: CPT | Performed by: RADIOLOGY

## 2020-12-04 ENCOUNTER — HOSPITAL ENCOUNTER (OUTPATIENT)
Dept: RADIATION ONCOLOGY | Facility: MEDICAL CENTER | Age: 50
End: 2020-12-04

## 2020-12-04 LAB
CHEMOTHERAPY INFUSION START DATE: NORMAL
CHEMOTHERAPY INFUSION START DATE: NORMAL
CHEMOTHERAPY INFUSION STOP DATE: NORMAL
CHEMOTHERAPY RECORDS: 0.32
CHEMOTHERAPY RECORDS: 1.8
CHEMOTHERAPY RECORDS: 1.8
CHEMOTHERAPY RECORDS: 1000
CHEMOTHERAPY RECORDS: 1000
CHEMOTHERAPY RECORDS: 2
CHEMOTHERAPY RECORDS: 2
CHEMOTHERAPY RECORDS: 5040
CHEMOTHERAPY RECORDS: 5040
CHEMOTHERAPY RECORDS: 896
CHEMOTHERAPY RECORDS: NORMAL
CHEMOTHERAPY RX CANCER: NORMAL
CHEMOTHERAPY RX CANCER: NORMAL
DATE 1ST CHEMO CANCER: NORMAL
DATE 1ST CHEMO CANCER: NORMAL
RAD ONC ARIA COURSE LAST TREATMENT DATE: NORMAL
RAD ONC ARIA COURSE LAST TREATMENT DATE: NORMAL
RAD ONC ARIA COURSE TREATMENT ELAPSED DAYS: NORMAL
RAD ONC ARIA COURSE TREATMENT ELAPSED DAYS: NORMAL
RAD ONC ARIA REFERENCE POINT DOSAGE GIVEN TO DATE: 10
RAD ONC ARIA REFERENCE POINT DOSAGE GIVEN TO DATE: 10
RAD ONC ARIA REFERENCE POINT DOSAGE GIVEN TO DATE: 11.02
RAD ONC ARIA REFERENCE POINT DOSAGE GIVEN TO DATE: 11.02
RAD ONC ARIA REFERENCE POINT DOSAGE GIVEN TO DATE: 50.4
RAD ONC ARIA REFERENCE POINT DOSAGE GIVEN TO DATE: 8.13
RAD ONC ARIA REFERENCE POINT DOSAGE GIVEN TO DATE: 8.96
RAD ONC ARIA REFERENCE POINT ID: NORMAL
RAD ONC ARIA REFERENCE POINT SESSION DOSAGE GIVEN: 2
RAD ONC ARIA REFERENCE POINT SESSION DOSAGE GIVEN: 2.2

## 2020-12-04 PROCEDURE — 77412 RADIATION TX DELIVERY LVL 3: CPT | Performed by: RADIOLOGY

## 2020-12-04 PROCEDURE — 77336 RADIATION PHYSICS CONSULT: CPT | Performed by: RADIOLOGY

## 2021-01-22 ENCOUNTER — HOSPITAL ENCOUNTER (OUTPATIENT)
Dept: RADIATION ONCOLOGY | Facility: MEDICAL CENTER | Age: 51
End: 2021-01-31
Attending: RADIOLOGY

## 2021-01-22 VITALS
DIASTOLIC BLOOD PRESSURE: 62 MMHG | TEMPERATURE: 96.9 F | OXYGEN SATURATION: 96 % | SYSTOLIC BLOOD PRESSURE: 121 MMHG | HEART RATE: 91 BPM

## 2021-01-22 DIAGNOSIS — Z17.0 MALIGNANT NEOPLASM OF UPPER-OUTER QUADRANT OF RIGHT BREAST IN FEMALE, ESTROGEN RECEPTOR POSITIVE (HCC): ICD-10-CM

## 2021-01-22 DIAGNOSIS — C50.411 MALIGNANT NEOPLASM OF UPPER-OUTER QUADRANT OF RIGHT BREAST IN FEMALE, ESTROGEN RECEPTOR POSITIVE (HCC): ICD-10-CM

## 2021-01-22 PROCEDURE — 99212 OFFICE O/P EST SF 10 MIN: CPT | Performed by: RADIOLOGY

## 2021-01-22 RX ORDER — ANASTROZOLE 1 MG/1
TABLET ORAL
COMMUNITY
Start: 2021-01-11

## 2021-01-22 ASSESSMENT — PAIN SCALES - GENERAL: PAINLEVEL: NO PAIN

## 2021-01-22 NOTE — PROGRESS NOTES
RADIATION ONCOLOGY FOLLOW-UP    DATE OF SERVICE: 1/22/2021    IDENTIFICATION:   A 50 y.o. female with history of prior left breast cancer estrogen positive status post neoadjuvant chemotherapy with residual disease.  Received radiation therapy and then tamoxifen now with new right breast cancer status post mastectomy ER ND positive with 11 nodes positive.  Also had a left breast mastectomy with no residual tumor.   Status post TC chemotherapy followed by comprehensive radiation therapy to the chest wall and draining lymphatics complete 12/4/2020.    HISTORY OF PRESENT ILLNESS:   Since last seen she is still having problems with decreased range of motion of both of her arms.  She feels like she has pain on the left side which had been previously radiated many years ago in 2012.  She also said she had some right ovarian pain.  She is continuing on anastrozole she has fatigue discomfort diabetes and hot flashes.She is here for routine follow-up to look at the right chest wall and draining lymphatics    CURRENT MEDICATIONS:  Current Outpatient Medications   Medication Sig Dispense Refill   • anastrozole (ARIMIDEX) 1 MG Tab TAKE ONE TABLET BY MOUTH DAILY     • acetaminophen (TYLENOL) 325 MG Tab Take 2 Tabs by mouth every 6 hours as needed (Mild Pain; (Pain scale 1-3); Temp greater than 100.5 F). 30 Tab 0   • atorvastatin (LIPITOR) 10 MG Tab Take 10 mg by mouth every evening.     • metFORMIN (GLUCOPHAGE) 500 MG Tab Take 500 mg by mouth 2 times a day, with meals.     • lidocaine-prilocaine (EMLA) 2.5-2.5 % Cream Apply  to affected area(s) as needed.     • silver sulfADIAZINE (SILVADENE) 1 % Cream Apply 1/8 inch layer to affected area BID (Patient not taking: Reported on 1/22/2021) 400 g 2   • oxyCODONE immediate-release (ROXICODONE) 5 MG Tab Take 5 mg by mouth every four hours as needed for Severe Pain.     • aspirin EC (ECOTRIN) 325 MG Tablet Delayed Response Take 1 Tab by mouth 2 times a day, with meals. 60 Tab 0   •  Cholecalciferol (VITAMIN D3) 125 MCG (5000 UT) Cap Take 1 Cap by mouth every morning.     • ascorbic acid (ASCORBIC ACID) 500 MG Tab Take 1,000 mg by mouth every morning.     • Multiple Vitamins-Minerals (WOMENS MULTIVITAMIN PO) Take 1 Tab by mouth every morning.     • tamoxifen (NOLVADEX) 10 MG Tab Take 1 Tab by mouth every evening. (Patient not taking: Reported on 1/22/2021) 60 Tab 3     No current facility-administered medications for this encounter.        ALLERGIES:  Patient has no known allergies.    FAMILY HISTORY:    Family History   Problem Relation Age of Onset   • Cancer Father         prostate   • Cancer Other    [unfilled]        SOCIAL HISTORY:     reports that she has never smoked. She has never used smokeless tobacco. She reports current alcohol use. She reports that she does not use drugs.      REVIEW OF SYSTEMS: Is significant for Pain in both breasts with associated decreased range of motion.  She has diabetes she has hot flashes dental issues constipation nausea with the anastrozole.  She has some nocturia and muscle pain as well as depression.  The rest of the review of systems has been reviewed by me and is documented in the nursing note in [facility] dated 11/22/2021.    PHYSICAL EXAM:     ECOG PERFORMANCE STATUS:  1= Restricted in physically strenuous activity, but ambulatory and able to carry out work of a light sedentary nature, e.g., light housework, office work.   Vitals:    01/22/21 1009   BP: 121/62   BP Location: Left arm   Patient Position: Sitting   BP Cuff Size: Adult   Pulse: 91   Temp: 36.1 °C (96.9 °F)   TempSrc: Temporal   SpO2: 96%   Pain Score: No pain        GENERAL: Well-appearing alert and oriented x3  Chest wall:Bilateral mastectomies. She has decreased range of motion of upper extremities bilaterally.  The right chest wall and draining lymphatics has hyperpigmentation consistent with radiation change.  She has lymphedema on both sides of the breast it should be noted that the  left side was also radiated many years ago.  No discrete masses are appreciated in either breast or axilla.  HEENT:  Pupils are equal, round, and reactive to light.  Extraocular muscles   are intact. Sclerae nonicteric.  Conjunctivae pink.  Oral cavity, tongue   protrudes midline.   NECK:  Supple without evidence of thyromegaly.  NODES:  No peripheral adenopathy of the neck, supraclavicular fossa or axillae   bilaterally.  LUNGS:  Clear to ascultation   HEART:  Regular rate and rhythm.  No murmur appreciated  ABDOMEN:  Soft. No evidence of hepatosplenomegaly.  ObeseEXTREMITIES:  Without Edema.  NEUROLOGIC:  Cranial nerves II through XII were intact. Normal stance and gait motor and sensory grossly within normal limits          IMPRESSION:    A 50 y.o. female with history of prior left breast cancer estrogen positive status post neoadjuvant chemotherapy with residual disease.  Received radiation therapy and then tamoxifen now with new right breast cancer status post mastectomy ER W[place] positive with 11 nodes positive.  Also had a left breast mastectomy with no residual tumor.That is post TC chemotherapy x4 Status post comprehensive radiation therapy to the right chest wall and draining lymphatics complete 12/4/2020.  Now on anastrozole.No evidence of disease.    RECOMMENDATIONS:   Patient is going to see us in follow-up in 6 months after she has undergone physical therapy to increase her range of motion of the upper extremities bilaterally.    And happy to see her sooner as needed.  She will continue seeing Dr. Tabor for systemic management.      Thank you for the opportunity to participate in her care.  If any questions or comments, please do not hesitate in calling.      Please note that this dictation was created using voice recognition software. I have made every reasonable attempt to correct obvious errors, but I expect that there are errors of grammar and possibly content that I did not discover before finalizing  the note.

## 2021-01-22 NOTE — NON-PROVIDER
Patient was seen today in clinic with Dr. Kahn for follow up.  Vitals signs and weight were obtained and pain assessment was completed.  Allergies and medications were reviewed with the patient.  Review of systems completed.     Vitals/Pain:  Vitals:    01/22/21 1009   BP: 121/62   BP Location: Left arm   Patient Position: Sitting   BP Cuff Size: Adult   Pulse: 91   Temp: 36.1 °C (96.9 °F)   TempSrc: Temporal   SpO2: 96%   Pain Score: No pain        Allergies:   Patient has no known allergies.    Current Medications:  Current Outpatient Medications   Medication Sig Dispense Refill   • anastrozole (ARIMIDEX) 1 MG Tab TAKE ONE TABLET BY MOUTH DAILY     • acetaminophen (TYLENOL) 325 MG Tab Take 2 Tabs by mouth every 6 hours as needed (Mild Pain; (Pain scale 1-3); Temp greater than 100.5 F). 30 Tab 0   • atorvastatin (LIPITOR) 10 MG Tab Take 10 mg by mouth every evening.     • metFORMIN (GLUCOPHAGE) 500 MG Tab Take 500 mg by mouth 2 times a day, with meals.     • lidocaine-prilocaine (EMLA) 2.5-2.5 % Cream Apply  to affected area(s) as needed.     • silver sulfADIAZINE (SILVADENE) 1 % Cream Apply 1/8 inch layer to affected area BID (Patient not taking: Reported on 1/22/2021) 400 g 2   • oxyCODONE immediate-release (ROXICODONE) 5 MG Tab Take 5 mg by mouth every four hours as needed for Severe Pain.     • aspirin EC (ECOTRIN) 325 MG Tablet Delayed Response Take 1 Tab by mouth 2 times a day, with meals. 60 Tab 0   • Cholecalciferol (VITAMIN D3) 125 MCG (5000 UT) Cap Take 1 Cap by mouth every morning.     • ascorbic acid (ASCORBIC ACID) 500 MG Tab Take 1,000 mg by mouth every morning.     • Multiple Vitamins-Minerals (WOMENS MULTIVITAMIN PO) Take 1 Tab by mouth every morning.     • tamoxifen (NOLVADEX) 10 MG Tab Take 1 Tab by mouth every evening. (Patient not taking: Reported on 1/22/2021) 60 Tab 3     No current facility-administered medications for this encounter.          PCP:  Galilea Hansen, Med  Ass't

## 2021-02-24 ENCOUNTER — PHYSICAL THERAPY (OUTPATIENT)
Dept: PHYSICAL THERAPY | Facility: REHABILITATION | Age: 51
End: 2021-02-24
Attending: RADIOLOGY

## 2021-02-24 DIAGNOSIS — Z17.0 MALIGNANT NEOPLASM OF UPPER-OUTER QUADRANT OF RIGHT BREAST IN FEMALE, ESTROGEN RECEPTOR POSITIVE (HCC): ICD-10-CM

## 2021-02-24 DIAGNOSIS — C50.411 MALIGNANT NEOPLASM OF UPPER-OUTER QUADRANT OF RIGHT BREAST IN FEMALE, ESTROGEN RECEPTOR POSITIVE (HCC): ICD-10-CM

## 2021-02-24 PROCEDURE — 97161 PT EVAL LOW COMPLEX 20 MIN: CPT

## 2021-02-24 ASSESSMENT — ENCOUNTER SYMPTOMS
QUALITY: TIGHT
PAIN SCALE AT HIGHEST: 8
PAIN TIMING: WHEN ACTIVE
PAIN SCALE: 0
PAIN LOCATION: R SHOULDER
PAIN SCALE AT LOWEST: 0
QUALITY: ACHING

## 2021-02-24 NOTE — OP THERAPY EVALUATION
Outpatient Physical Therapy  INITIAL EVALUATION    [facility] [place]  [address], [address]  [place] [address]  Phone:  [phone]  Fax:  [phone]    Date of Evaluation: 02/24/2021    Patient: Zahida Rodriguez  YOB: 1970  MRN: [id number]     Referring Provider: Amy Kahn M.D.  [address]  [place] [address]   Referring Diagnosis Malignant neoplasm of upper-outer quadrant of right breast in female, estrogen receptor positive (HCC) [C50.411, Z17.0]     Time Calculation    Start time: 1001  Stop time: 1035 Time Calculation (min): 34 minutes         Chief Complaint: Shoulder Problem    Visit Diagnoses     ICD-10-CM   1. Malignant neoplasm of upper-outer quadrant of right breast in female, estrogen receptor positive (HCC)  C50.411    Z17.0       No data found  Subjective:   History of Present Illness:     Mechanism of injury:  Pt 30 min late to appointment- went to wrong PT clinic.    used. Janeth: [id number]    Pt s/p bilateral masectomy 4/10/20 with decreased motion of R>L shoulder since. Pt reports pain in R shoulder with use overhead or reaching far away from her body, but minimal to no pain in her L shoulder. She states that pain will stay in her R shoulder and sometimes go up towards her neck. She reports difficulty using her R arm to reach above her head, picking items up, and reaching down to put on her shoes.No trouble with L shoulder. Pt denies shooting pain, numbness/tingling in BUE's. She reports that she will get swelling in her R arm/hand that comes and goes but she has not been diagnoses with lymphedema or has had to wear compression garments.     PMH: R ankle ORIF 8/27/20- pt id not receive PT after surgery due to lack of insurance. She continues to wear her walking boot when outside her home, but does not wear it inside her home.   Headache comments: headaches daily usually in afternoon, pt states this has been  "present since chemo medication  Ear problems: ear ache (R side occasionally)  Sleep disturbance:  Interrupted sleep (pt reports insonmia and difficult to lay on R side due to shoulder pain)  Pain:     Current pain ratin    At best pain ratin    At worst pain ratin    Location:  R shoulder     Quality:  Aching and tight    Pain timing:  When active  Social Support:     Lives with:  Spouse  Hand dominance:  Right  Diagnostic Tests:     Diagnostic Tests Comments:  DEXA: According to the World Health Organization classification, bone mineral density of this patient is normal in the lumbar spine and normal in the left femur.  Treatments:     None    Patient Goals:     Patient goals for therapy:  Decreased pain    Other patient goals:  Move R arm more.       Past Medical History:   Diagnosis Date   • Cancer (HCC)     breast cancer/ chemo and radiation   • COVID-19 virus detected 07/15/2020    pt states on  has been symptom free for 3 weeks, pt had a negative covid test  on  [facility], pt has results via email    • Diabetes (HCC)     \"prediabetic\"    • High cholesterol    • Malignant neoplasm of upper-outer quadrant of right female breast (HCC)    • Pain     left foot   • Psychiatric problem     depression    • Snoring      Past Surgical History:   Procedure Laterality Date   • ANKLE ORIF Left 2020    Procedure: ORIF, ANKLE;  Surgeon: Efren Navarro M.D.;  Location: [facility];  Service: Orthopedics   • PB MASTECTOMY, SIMPLE, COMPLETE Left 4/10/2020    Procedure: MASTECTOMY-SIMPLE;  Surgeon: Codie Carreno M.D.;  Location: [facility];  Service: General   • PB MASTECTOMY, MODIFIED RADICAL Right 4/10/2020    Procedure: MASTECTOMY, MODIFIED RADICAL;  Surgeon: Codie Carreno M.D.;  Location: [facility];  Service: General   • AXILLARY NODE DISSECTION Right 4/10/2020    Procedure: LYMPHADENECTOMY, AXILLARY-RIGHT;  Surgeon: Codie Carreno, " M.D.;  Location: [facility];  Service: General   • CATH PLACEMENT Left 4/10/2020    Procedure: INSERTION, CATHETER FOR PORT PLACEMENT;  Surgeon: Codie Carreno M.D.;  Location: SURGERY [facility];  Service: General   • OTHER  2013    lumpectomy   • GYN SURGERY  1989,1991    c sec x 2   • OTHER ORTHOPEDIC SURGERY      lumbar discectomy     Social History     Tobacco Use   • Smoking status: Never Smoker   • Smokeless tobacco: Never Used   Substance Use Topics   • Alcohol use: Yes     Comment: 1 per week      Family and Occupational History     Socioeconomic History   • Marital status:      Spouse name: Not on file   • Number of children: Not on file   • Years of education: Not on file   • Highest education level: Not on file   Occupational History   • Not on file       Objective     Cervical Screen    Cervical range of motion within normal limits with the following exceptions: Pain and reprots of tightness with cerival flexion, R and L rotation    Neurological Testing     Sensation     Shoulder   Left Shoulder   Intact: light touch    Right Shoulder   Intact: light touch    Myotome testing   Cervical (left)   C5 (deltoid): 4-  C6 (biceps): 5  C7 (triceps): 5  C8 (thumb extension): 5  T1 (intrinsics): 5    Cervical (right)   C5 (deltoid): 3-  C6 (biceps): 5  C7 (triceps): 5  C8 (thumb extension): 5  T1 (intrinsics): 5    Palpation   Left   Tenderness of the cervical paraspinals.     Right   Tenderness of the biceps, cervical paraspinals, latissimus, levator scapulae, pectoralis major, supraspinatus, thoracic paraspinals and upper trapezius.     Tenderness     Left Shoulder   No tenderness in the acromion, bicipital groove, medial scapula and scapular spine.     Right Shoulder  Tenderness in the acromion, bicipital groove, medial scapula and scapular spine.     Active Range of Motion   Left Shoulder   Flexion: WFL and with pain  Abduction: 125 degrees with pain  External rotation 0°: 55  degrees with pain  External rotation BTH: Active external rotation behind the head: top of head.   Internal rotation BTB: T5 with pain    Right Shoulder   Flexion: 120 degrees with pain  Abduction: 105 degrees with pain  External rotation 0°: 50 degrees   Internal rotation BTB: L3 with pain    Passive Range of Motion   Left Shoulder   Flexion: 160 degrees with pain  Abduction: 180 degrees   External rotation 45°: 65 degrees     Right Shoulder   Flexion: 155 degrees with pain  Extension: 145 degrees with pain  External rotation 45°: 45 degrees     Joint Play     Right Shoulder     Posterior capsule: hypomobile       Comments: painful    Inferior capsule: hypomobile    1st rib: hypomobile    Strength:      Left Shoulder   Planes of Motion   Flexion: 4-   Abduction: 3-   External rotation at 0°: 4-   Internal rotation at 0°: 4-     Right Shoulder   Planes of Motion   Flexion: 3-   Abduction: 3-   External rotation at 0°: 3   Internal rotation at 0°: 3     Left Wrist/Hand    (2nd hand position)     Trial 1: 25    Trial 2: 20    Trial 3: 20    Average: 20    Right Wrist/Hand    (2nd hand position)     Trial 1: 10    Trial 2: 10    Trial 3: 15    Average: 11.67        Therapeutic Exercises (CPT 49075):     1. Review of PT POC re: focus on ROM and strength    16. UPOC 5/5/21      Time-based treatments/modalities:           Assessment, Response and Plan:   Impairments: abnormal or restricted ROM, impaired physical strength, lacks appropriate home exercise program and pain with function    Assessment details:  Pt is a pleasant 51 yo female that presents with decreased R>L shoulder ROM and strength s/p double mastectomy 4/10/20. Her decreased ROM and strength limit her ability to perform I/ADL's including OH reaching, donning shoes/socks, opening jars, bathing, and sleeping. She would benefit from skilled PT to address above listed functional impairments and improve her overall function with less pain in order to  enhance her QOL.   Barriers to therapy: chronicity.  Prognosis: fair    Goals:   Short Term Goals:   1. HEP will be established  2. Pt will be compliant and independent with HEP  Short term goal time span:  1-2 weeks      Long Term Goals:    1. Improve R shoulder flex AROM to 160 degrees to allow for improved tolerance to OH reaching  2. Improve B shoulder abd AROM by 10 degrees to allow for improved OH reaching  3. Increase R shoulder strength by 1/2 MMT grade for improved functional use of R shoulder  4. R  strength equal to L for increased functional use of R UE  5. Pt will tolerate don/doffing shoes and socks without increased R shoulder PQ  6. Improve QuickDASH by 16 points to demo improvement in overall function     Long term goal time span:  2-4 months    Plan:   Therapy options:  Physical therapy treatment to continue  Planned therapy interventions:  E Stim Unattended (CPT 95564), Neuromuscular Re-education (CPT 76881), Therapeutic Exercise (CPT 98386) and Manual Therapy (CPT 25290)  Frequency:  2x week  Duration in weeks:  10  Duration in visits:  10  Discussed with:  Patient  Plan details:  Requesting: 97014x1, 97112 x1, 97110 x2 for 2x/week for 10 weeks      Functional Assessment Used  Quickdash General Total Score: 84.09     Referring provider co-signature:  I have reviewed this plan of care and my co-signature certifies the need for services.    Certification Period: 02/24/2021 to  05/05/21    Physician Signature: ________________________________ Date: ______________

## 2021-03-02 ENCOUNTER — HOSPITAL ENCOUNTER (OUTPATIENT)
Facility: MEDICAL CENTER | Age: 51
End: 2021-03-02
Attending: INTERNAL MEDICINE

## 2021-03-02 LAB
ALBUMIN SERPL BCP-MCNC: 3.9 G/DL (ref 3.2–4.9)
ALBUMIN/GLOB SERPL: 1.2 G/DL
ALP SERPL-CCNC: 121 U/L (ref 30–99)
ALT SERPL-CCNC: 63 U/L (ref 2–50)
ANION GAP SERPL CALC-SCNC: 12 MMOL/L (ref 7–16)
AST SERPL-CCNC: 73 U/L (ref 12–45)
BASOPHILS # BLD AUTO: 0.5 % (ref 0–1.8)
BASOPHILS # BLD: 0.04 K/UL (ref 0–0.12)
BILIRUB SERPL-MCNC: 0.3 MG/DL (ref 0.1–1.5)
BUN SERPL-MCNC: 10 MG/DL (ref 8–22)
CALCIUM SERPL-MCNC: 9.9 MG/DL (ref 8.5–10.5)
CHLORIDE SERPL-SCNC: 102 MMOL/L (ref 96–112)
CO2 SERPL-SCNC: 24 MMOL/L (ref 20–33)
CREAT SERPL-MCNC: 0.33 MG/DL (ref 0.5–1.4)
EOSINOPHIL # BLD AUTO: 0.11 K/UL (ref 0–0.51)
EOSINOPHIL NFR BLD: 1.3 % (ref 0–6.9)
ERYTHROCYTE [DISTWIDTH] IN BLOOD BY AUTOMATED COUNT: 46.6 FL (ref 35.9–50)
GLOBULIN SER CALC-MCNC: 3.3 G/DL (ref 1.9–3.5)
GLUCOSE SERPL-MCNC: 113 MG/DL (ref 65–99)
HCT VFR BLD AUTO: 43.2 % (ref 37–47)
HGB BLD-MCNC: 13.8 G/DL (ref 12–16)
IMM GRANULOCYTES # BLD AUTO: 0.07 K/UL (ref 0–0.11)
IMM GRANULOCYTES NFR BLD AUTO: 0.8 % (ref 0–0.9)
LYMPHOCYTES # BLD AUTO: 2.28 K/UL (ref 1–4.8)
LYMPHOCYTES NFR BLD: 26.4 % (ref 22–41)
MCH RBC QN AUTO: 28.7 PG (ref 27–33)
MCHC RBC AUTO-ENTMCNC: 31.9 G/DL (ref 33.6–35)
MCV RBC AUTO: 89.8 FL (ref 81.4–97.8)
MONOCYTES # BLD AUTO: 0.61 K/UL (ref 0–0.85)
MONOCYTES NFR BLD AUTO: 7.1 % (ref 0–13.4)
NEUTROPHILS # BLD AUTO: 5.52 K/UL (ref 2–7.15)
NEUTROPHILS NFR BLD: 63.9 % (ref 44–72)
NRBC # BLD AUTO: 0 K/UL
NRBC BLD-RTO: 0 /100 WBC
PLATELET # BLD AUTO: 361 K/UL (ref 164–446)
PMV BLD AUTO: 10.4 FL (ref 9–12.9)
POTASSIUM SERPL-SCNC: 3.9 MMOL/L (ref 3.6–5.5)
PROT SERPL-MCNC: 7.2 G/DL (ref 6–8.2)
RBC # BLD AUTO: 4.81 M/UL (ref 4.2–5.4)
SODIUM SERPL-SCNC: 138 MMOL/L (ref 135–145)
WBC # BLD AUTO: 8.6 K/UL (ref 4.8–10.8)

## 2021-03-02 PROCEDURE — 85025 COMPLETE CBC W/AUTO DIFF WBC: CPT

## 2021-03-02 PROCEDURE — 80053 COMPREHEN METABOLIC PANEL: CPT

## 2021-03-05 ENCOUNTER — HOSPITAL ENCOUNTER (OUTPATIENT)
Dept: RADIOLOGY | Facility: MEDICAL CENTER | Age: 51
End: 2021-03-05
Attending: INTERNAL MEDICINE

## 2021-03-05 DIAGNOSIS — C50.411 MALIGNANT NEOPLASM OF UPPER-OUTER QUADRANT OF RIGHT FEMALE BREAST, UNSPECIFIED ESTROGEN RECEPTOR STATUS (HCC): ICD-10-CM

## 2021-03-05 DIAGNOSIS — C50.912 MALIGNANT NEOPLASM OF LEFT FEMALE BREAST, UNSPECIFIED ESTROGEN RECEPTOR STATUS, UNSPECIFIED SITE OF BREAST (HCC): ICD-10-CM

## 2021-03-05 PROCEDURE — 76700 US EXAM ABDOM COMPLETE: CPT

## 2021-03-10 RX ORDER — HEPARIN SODIUM (PORCINE) LOCK FLUSH IV SOLN 100 UNIT/ML 100 UNIT/ML
500 SOLUTION INTRAVENOUS PRN
Status: CANCELLED | OUTPATIENT
Start: 2021-04-13

## 2021-03-10 RX ORDER — 0.9 % SODIUM CHLORIDE 0.9 %
20 VIAL (ML) INJECTION PRN
Status: CANCELLED | OUTPATIENT
Start: 2021-04-13

## 2021-03-12 ENCOUNTER — PHYSICAL THERAPY (OUTPATIENT)
Dept: PHYSICAL THERAPY | Facility: REHABILITATION | Age: 51
End: 2021-03-12
Attending: RADIOLOGY

## 2021-03-12 DIAGNOSIS — C50.411 MALIGNANT NEOPLASM OF UPPER-OUTER QUADRANT OF RIGHT BREAST IN FEMALE, ESTROGEN RECEPTOR POSITIVE (HCC): ICD-10-CM

## 2021-03-12 DIAGNOSIS — Z17.0 MALIGNANT NEOPLASM OF UPPER-OUTER QUADRANT OF RIGHT BREAST IN FEMALE, ESTROGEN RECEPTOR POSITIVE (HCC): ICD-10-CM

## 2021-03-12 PROCEDURE — 97110 THERAPEUTIC EXERCISES: CPT

## 2021-03-12 NOTE — OP THERAPY DAILY TREATMENT
"  Outpatient Physical Therapy  DAILY TREATMENT     [facility] Outpatient Physical Therapy [place]  [address], [address]  [facility] [address]  Phone:  [phone]  Fax:  [phone]    Date: 03/12/2021    Patient: Zahida Rodriguez  YOB: 1970  MRN: [id number]     Time Calculation    Start time: 1110  Stop time: 1135 Time Calculation (min): 25 minutes         Chief Complaint: Shoulder Problem    Visit #: 2    SUBJECTIVE:  : Jennie [id number]  R arm is feeling better since eval- able to  items and reach overhead with less pain. Putting on hoes/socks is still painful but a little easier.     OBJECTIVE:            Therapeutic Exercises (CPT 09890):     1. Supine AAROM with wand, flex x10 , horiz abd/add x10 , serratus punch x10    4. SL ER, with towel x10    5. Wall crawl , R and L x3 ea     6. Pec stretch in corner, as tolerated     7. Scap retract , seated 3\" x10    16. UPOC 5/5/21      Therapeutic Exercise Summary: Pt educated in HEP, provided with HEP in Stateless      Time-based treatments/modalities:    Physical Therapy Timed Treatment Charges  Therapeutic exercise minutes (CPT 96098): 25 minutes      Pain rating (1-10) before treatment:  4-5  Pain rating (1-10) after treatment:  4-5    ASSESSMENT:   Response to treatment: Pt with good tolerance to flexibility and mobility exercises today. Limited reps tolerated for some exercises as pt reported R arm fatigue, but no increase in pain.      PLAN/RECOMMENDATIONS:   Plan for treatment: therapy treatment to continue next visit.  Planned interventions for next visit: continue with current treatment.       "

## 2021-03-15 ENCOUNTER — PHYSICAL THERAPY (OUTPATIENT)
Dept: PHYSICAL THERAPY | Facility: REHABILITATION | Age: 51
End: 2021-03-15
Attending: RADIOLOGY

## 2021-03-15 DIAGNOSIS — C50.411 MALIGNANT NEOPLASM OF UPPER-OUTER QUADRANT OF RIGHT BREAST IN FEMALE, ESTROGEN RECEPTOR POSITIVE (HCC): ICD-10-CM

## 2021-03-15 DIAGNOSIS — Z17.0 MALIGNANT NEOPLASM OF UPPER-OUTER QUADRANT OF RIGHT BREAST IN FEMALE, ESTROGEN RECEPTOR POSITIVE (HCC): ICD-10-CM

## 2021-03-15 PROCEDURE — 97110 THERAPEUTIC EXERCISES: CPT

## 2021-03-15 NOTE — OP THERAPY DAILY TREATMENT
Outpatient Physical Therapy  DAILY TREATMENT     [facility] Outpatient Physical Therapy [place]  [address]  [facility] [address]  Phone:  [phone]  Fax:  [phone]    Date: 03/15/2021    Patient: Zahida Rodriguez  YOB: 1970  MRN: [id number]     Time Calculation    Start time: 0905  Stop time: 0935 Time Calculation (min): 30 minutes         Chief Complaint: Shoulder Problem    Visit #: 3    SUBJECTIVE:  : Jennie ([id number])  Feeling better overall, but still have stiffness at the end of my movements.     OBJECTIVE:  Current objective measures: R shoulder flex AROM:117 degrees. R shoulder abd AROM:94 degrees           Therapeutic Exercises (CPT 53763):     1. Supine AAROM with wand, flex x10 , horiz abd/add x10 , serratus punch x10    4. SL ER, with towel x10    5. Wall crawl , R and L x5 ea     6. Row, GTT x10     7. Shoulder ext , GTT x10     8. IR/ER, GTT x10 ea     16. UPOC 5/5/21      Therapeutic Exercise Summary: Updated HEP for row and ext, pt declined handout      Time-based treatments/modalities:    Physical Therapy Timed Treatment Charges  Therapeutic exercise minutes (CPT 12987): 30 minutes          ASSESSMENT:   Response to treatment: Pt reports improvement in function, but no improvement noted in AROM measurements today. She may be experiencing less pain which allow her to complete OH and reaching tasks easier. She did well with there ex today and was able to achieve R shoulder flex WNL with AAROM exercises. She was fatigued with resistance, will have to work into strength as able.     PLAN/RECOMMENDATIONS:   Plan for treatment: therapy treatment to continue next visit.  Planned interventions for next visit: continue with current treatment.

## 2021-03-22 ENCOUNTER — PHYSICAL THERAPY (OUTPATIENT)
Dept: PHYSICAL THERAPY | Facility: REHABILITATION | Age: 51
End: 2021-03-22
Attending: RADIOLOGY

## 2021-03-22 DIAGNOSIS — Z17.0 MALIGNANT NEOPLASM OF UPPER-OUTER QUADRANT OF RIGHT BREAST IN FEMALE, ESTROGEN RECEPTOR POSITIVE (HCC): ICD-10-CM

## 2021-03-22 DIAGNOSIS — C50.411 MALIGNANT NEOPLASM OF UPPER-OUTER QUADRANT OF RIGHT BREAST IN FEMALE, ESTROGEN RECEPTOR POSITIVE (HCC): ICD-10-CM

## 2021-03-22 PROCEDURE — 97110 THERAPEUTIC EXERCISES: CPT

## 2021-03-22 NOTE — OP THERAPY DAILY TREATMENT
Outpatient Physical Therapy  DAILY TREATMENT     [facility] Outpatient Physical Therapy [place]  [address], [address]  [facility] [address]  Phone:  [phone]  Fax:  [phone]    Date: 2021    Patient: Zahida Rodriguez  YOB: 1970  MRN: [id number]     Time Calculation    Start time: 1135  Stop time: 1215 Time Calculation (min): 40 minutes         Chief Complaint: Shoulder Problem    Visit #: 4    SUBJECTIVE:  : [id number] Hiral   Doing good with exercises, no extra soreness. Able put on shoes and socks easily, but still have trouble reaching over head to grab a dish down.     OBJECTIVE:  Current objective measures:  R shoulder AROM: flex: 135 degrees, abd:130 degrees   L shoulder abd AROM: 152 degrees  R shoulder MMT: flex: 4+/5, abd: 4/5, ER: 4+/5, IR: 4+/5   strength: R: 30,30,30 (av#) L: 25, 25, 30 (av.67#)    STG's (1-2 weeks):  1. HEP will be established (met)  2. Pt will be compliant and independent with HEP (met)  LTG's (2-4 months):  1. Improve R shoulder flex AROM to 160 degrees to allow for improved tolerance to OH reaching (not met, continue)  2. Improve B shoulder abd AROM by 10 degrees to allow for improved OH reaching (partially met, continue)  3. Increase R shoulder strength by 1/2 MMT grade for improved functional use of R shoulder (met)  4. R  strength equal to L for increased functional use of R UE  5. Pt will tolerate don/doffing shoes and socks without increased R shoulder PQ (met)  6. Improve QuickDASH by 16 points to demo improvement in overall function (not met, continue)    PT Functional Assessment Tool Used: Quick DASH  PT Functional Assessment Score: 59.1% (sum of responses: 37)       Therapeutic Exercises (CPT 25002):     5. Wall crawl , R and L x10 ea     6. Row, GTT x15    7. Shoulder ext , GTT x10     8. IR/ER, GTT x10 ea B    9. AAROM with dowel , standing flexion x5 R     16. UPOC 21      Therapeutic Exercise  Summary: HEP updated, pt provided with handout in Indonesian.     Therapeutic Treatments and Modalities:     1. Hot or Cold Pack Therapy (CPT 65779), CP to R shoulder x10 min , pt seated; no charge    Time-based treatments/modalities:    Physical Therapy Timed Treatment Charges  Therapeutic exercise minutes (CPT 24329): 30 minutes          ASSESSMENT:   Response to treatment: Pt demo's improvement in pain tolerance, AROM, R shoulder strength and R  strength. She has increased tolerance to functional activities as per QuickDASH and per her report of function during daily tasks. She does not yet demo full AROM which limits her tolerance to OH activities and household chores. STG's have been met and LTG's partially met. She would benefit from continued skilled PT to address ROM and strength deficits to improve overall function with less pain.     PLAN/RECOMMENDATIONS:   Plan for treatment: therapy treatment to continue next visit. Try UBE next session.   Planned interventions for next visit: continue with current treatment.

## 2021-03-30 ENCOUNTER — APPOINTMENT (OUTPATIENT)
Dept: RADIOLOGY | Facility: MEDICAL CENTER | Age: 51
End: 2021-03-30
Attending: EMERGENCY MEDICINE

## 2021-03-30 ENCOUNTER — HOSPITAL ENCOUNTER (EMERGENCY)
Facility: MEDICAL CENTER | Age: 51
End: 2021-03-31
Attending: EMERGENCY MEDICINE | Admitting: EMERGENCY MEDICINE

## 2021-03-30 VITALS
SYSTOLIC BLOOD PRESSURE: 117 MMHG | HEIGHT: 60 IN | OXYGEN SATURATION: 95 % | WEIGHT: 240.52 LBS | RESPIRATION RATE: 19 BRPM | TEMPERATURE: 98.4 F | DIASTOLIC BLOOD PRESSURE: 66 MMHG | BODY MASS INDEX: 47.22 KG/M2 | HEART RATE: 85 BPM

## 2021-03-30 DIAGNOSIS — R10.9 LEFT SIDED ABDOMINAL PAIN: ICD-10-CM

## 2021-03-30 DIAGNOSIS — K59.00 CONSTIPATION, UNSPECIFIED CONSTIPATION TYPE: ICD-10-CM

## 2021-03-30 LAB
ALBUMIN SERPL BCP-MCNC: 3.9 G/DL (ref 3.2–4.9)
ALBUMIN/GLOB SERPL: 1 G/DL
ALP SERPL-CCNC: 112 U/L (ref 30–99)
ALT SERPL-CCNC: 75 U/L (ref 2–50)
ANION GAP SERPL CALC-SCNC: 11 MMOL/L (ref 7–16)
AST SERPL-CCNC: 76 U/L (ref 12–45)
BILIRUB SERPL-MCNC: 0.2 MG/DL (ref 0.1–1.5)
BUN SERPL-MCNC: 13 MG/DL (ref 8–22)
CALCIUM SERPL-MCNC: 9.6 MG/DL (ref 8.5–10.5)
CHLORIDE SERPL-SCNC: 104 MMOL/L (ref 96–112)
CO2 SERPL-SCNC: 25 MMOL/L (ref 20–33)
CREAT SERPL-MCNC: 0.51 MG/DL (ref 0.5–1.4)
GLOBULIN SER CALC-MCNC: 3.8 G/DL (ref 1.9–3.5)
GLUCOSE SERPL-MCNC: 203 MG/DL (ref 65–99)
LIPASE SERPL-CCNC: 24 U/L (ref 11–82)
POTASSIUM SERPL-SCNC: 4 MMOL/L (ref 3.6–5.5)
PROT SERPL-MCNC: 7.7 G/DL (ref 6–8.2)
SODIUM SERPL-SCNC: 140 MMOL/L (ref 135–145)

## 2021-03-30 PROCEDURE — 99284 EMERGENCY DEPT VISIT MOD MDM: CPT

## 2021-03-30 PROCEDURE — 80053 COMPREHEN METABOLIC PANEL: CPT

## 2021-03-30 PROCEDURE — 85025 COMPLETE CBC W/AUTO DIFF WBC: CPT

## 2021-03-30 PROCEDURE — 74018 RADEX ABDOMEN 1 VIEW: CPT

## 2021-03-30 PROCEDURE — 700101 HCHG RX REV CODE 250: Performed by: EMERGENCY MEDICINE

## 2021-03-30 PROCEDURE — 83690 ASSAY OF LIPASE: CPT

## 2021-03-30 RX ORDER — ENEMA 19; 7 G/133ML; G/133ML
1 ENEMA RECTAL ONCE
Status: COMPLETED | OUTPATIENT
Start: 2021-03-30 | End: 2021-03-30

## 2021-03-30 RX ADMIN — SODIUM PHOSPHATE, DIBASIC AND SODIUM PHOSPHATE, MONOBASIC 133 ML: 7; 19 ENEMA RECTAL at 23:49

## 2021-03-30 ASSESSMENT — FIBROSIS 4 INDEX: FIB4 SCORE: 1.27

## 2021-03-31 LAB
BASOPHILS # BLD AUTO: 0.2 % (ref 0–1.8)
BASOPHILS # BLD: 0.02 K/UL (ref 0–0.12)
EOSINOPHIL # BLD AUTO: 0.12 K/UL (ref 0–0.51)
EOSINOPHIL NFR BLD: 1.3 % (ref 0–6.9)
ERYTHROCYTE [DISTWIDTH] IN BLOOD BY AUTOMATED COUNT: 45.1 FL (ref 35.9–50)
HCT VFR BLD AUTO: 41 % (ref 37–47)
HGB BLD-MCNC: 13.3 G/DL (ref 12–16)
IMM GRANULOCYTES # BLD AUTO: 0.04 K/UL (ref 0–0.11)
IMM GRANULOCYTES NFR BLD AUTO: 0.4 % (ref 0–0.9)
LYMPHOCYTES # BLD AUTO: 2.08 K/UL (ref 1–4.8)
LYMPHOCYTES NFR BLD: 22.6 % (ref 22–41)
MCH RBC QN AUTO: 28.5 PG (ref 27–33)
MCHC RBC AUTO-ENTMCNC: 32.4 G/DL (ref 33.6–35)
MCV RBC AUTO: 87.8 FL (ref 81.4–97.8)
MONOCYTES # BLD AUTO: 0.73 K/UL (ref 0–0.85)
MONOCYTES NFR BLD AUTO: 7.9 % (ref 0–13.4)
NEUTROPHILS # BLD AUTO: 6.21 K/UL (ref 2–7.15)
NEUTROPHILS NFR BLD: 67.6 % (ref 44–72)
NRBC # BLD AUTO: 0 K/UL
NRBC BLD-RTO: 0 /100 WBC
PLATELET # BLD AUTO: 364 K/UL (ref 164–446)
PMV BLD AUTO: 9.8 FL (ref 9–12.9)
RBC # BLD AUTO: 4.67 M/UL (ref 4.2–5.4)
WBC # BLD AUTO: 9.2 K/UL (ref 4.8–10.8)

## 2021-03-31 NOTE — ED TRIAGE NOTES
"Chief Complaint   Patient presents with   • Abdominal Pain     Pt complains of \"bloating\" LUQ. Denies vomiting, or bloody stools, +constipation. Pt stated she has been giving herself suppositories with no relief        Pt amb to triage with steady gait for above complaint.   Pt is alert and oriented, speaking in full sentences, follows commands and responds appropriately to questions. Resp are even and unlabored. No obvious acute distress.    Pt placed in lobby. Pt educated on triage process. Pt encouraged to alert staff for any changes.    Vitals:    03/30/21 2021   BP: 155/95   Pulse: (!) 106   Resp: 16   Temp: 36.9 °C (98.4 °F)   SpO2: 96%         "

## 2021-03-31 NOTE — ED NOTES
Pt DCed at this time. AAOX4, VSS, ambulating with steady gait. Education provided on enemas, f/u care, and all questions addressed. Pt and daughter at bedside verbalize understanding and ambulated off unit with WALLY

## 2021-03-31 NOTE — ED PROVIDER NOTES
"ED Provider Note    CHIEF COMPLAINT  Chief Complaint   Patient presents with   • Abdominal Pain     Pt complains of \"bloating\" LUQ. Denies vomiting, or bloody stools, +constipation. Pt stated she has been giving herself suppositories with no relief        HPI  Zahidapablito Rodriguez is a 50 y.o. female who presents for evaluation of left upper quadrant abdominal pain this been going on for a day or 2, describes a bloating-like sensation, associated with a little bit of nausea but no vomiting.  She had a bowel movement today, felt as though it was incomplete and that she had more but could not get it out.  She tried a suppository and it did not help.  No fever.  No chest pain or shortness of breath, no acute back pain and no other specific complaints.  Past medical history is significant for breast cancer for which she takes an oral chemotherapy, she is not on any prescribed pain medication.    REVIEW OF SYSTEMS  Negative for fever, rash, chest pain, dyspnea, vomiting, diarrhea, headache, back pain. All other systems are negative.     PAST MEDICAL HISTORY  Past Medical History:   Diagnosis Date   • Cancer (HCC) 2013    breast cancer/ chemo and radiation   • COVID-19 virus detected 07/15/2020    pt states on 8/25 has been symptom free for 3 weeks, pt had a negative covid test  [facility], pt has results via email    • Diabetes (HCC)     \"prediabetic\"    • High cholesterol    • Malignant neoplasm of upper-outer quadrant of right female breast (HCC)    • Pain     left foot   • Psychiatric problem     depression    • Snoring        FAMILY HISTORY  Family History   Problem Relation Age of Onset   • Cancer Father         prostate   • Cancer Other        SOCIAL HISTORY  Social History     Tobacco Use   • Smoking status: Never Smoker   • Smokeless tobacco: Never Used   Substance Use Topics   • Alcohol use: Yes     Comment: 1 per week    • Drug use: Never       SURGICAL HISTORY  Past Surgical History: "   Procedure Laterality Date   • ANKLE ORIF Left 8/27/2020    Procedure: ORIF, ANKLE;  Surgeon: Efren Navarro M.D.;  Location: SURGERY [facility];  Service: Orthopedics   • PB MASTECTOMY, SIMPLE, COMPLETE Left 4/10/2020    Procedure: MASTECTOMY-SIMPLE;  Surgeon: Codie Carreno M.D.;  Location: SURGERY [facility];  Service: General   • PB MASTECTOMY, MODIFIED RADICAL Right 4/10/2020    Procedure: MASTECTOMY, MODIFIED RADICAL;  Surgeon: Codie Carreno M.D.;  Location: SURGERY [facility];  Service: General   • AXILLARY NODE DISSECTION Right 4/10/2020    Procedure: LYMPHADENECTOMY, AXILLARY-RIGHT;  Surgeon: Codie Carreno M.D.;  Location: SURGERY [facility];  Service: General   • CATH PLACEMENT Left 4/10/2020    Procedure: INSERTION, CATHETER FOR PORT PLACEMENT;  Surgeon: Codie Carreno M.D.;  Location: SURGERY [facility];  Service: General   • OTHER  2013    lumpectomy   • GYN SURGERY  1989,1991    c sec x 2   • OTHER ORTHOPEDIC SURGERY      lumbar discectomy       CURRENT MEDICATIONS  I personally reviewed the medication list in the charting documentation.     ALLERGIES  No Known Allergies    MEDICAL RECORD  I have reviewed patient's medical record and pertinent results are listed above.      PHYSICAL EXAM  VITAL SIGNS: /66   Pulse 85   Temp 36.9 °C (98.4 °F) (Temporal)   Resp 19   Ht 1.524 m (5')   Wt 109 kg (240 lb 8.4 oz)   SpO2 95%   Breastfeeding No   BMI 46.97 kg/m²    Constitutional: Well appearing patient in no acute distress.  Awake and alert, not toxic nor ill in appearance.  HENT: Normocephalic, no obvious evidence of acute trauma.  Eyes: No scleral icterus. Normal conjunctiva   Neck: Comfortable movement without any obvious restriction in the range of motion.  Cardiovascular: Upon ascultation I appreciate a regular heart rhythm and a normal rate.   Thorax & Lungs: Normal nonlabored respirations. Upon ascultation, there is no obvious chest wall tenderness. I  appreciate no wheezing, rhonchi or rales. There is normal air movement.    Abdomen: The abdomen is not visibly distended. Upon palpation, I find there to be mild tenderness in left upper quadrant, no rebound or guarding.  Skin: The exposed portions of skin reveal no obvious rash or other abnormalities.  Extremities/Musculoskeletal: No obvious sign of acute trauma. No asymmetric calf tenderness or edema.   Neurologic: Alert & oriented. No focal deficits observed.   Psychiatric: Normal affect appropriate for the clinical situation.    DIAGNOSTIC STUDIES / PROCEDURES    LABS/EKGs  Results for orders placed or performed during the hospital encounter of 03/30/21   CBC WITH DIFFERENTIAL   Result Value Ref Range    WBC 9.2 4.8 - 10.8 K/uL    RBC 4.67 4.20 - 5.40 M/uL    Hemoglobin 13.3 12.0 - 16.0 g/dL    Hematocrit 41.0 37.0 - 47.0 %    MCV 87.8 81.4 - 97.8 fL    MCH 28.5 27.0 - 33.0 pg    MCHC 32.4 (L) 33.6 - 35.0 g/dL    RDW 45.1 35.9 - 50.0 fL    Platelet Count 364 164 - 446 K/uL    MPV 9.8 9.0 - 12.9 fL    Neutrophils-Polys 67.60 44.00 - 72.00 %    Lymphocytes 22.60 22.00 - 41.00 %    Monocytes 7.90 0.00 - 13.40 %    Eosinophils 1.30 0.00 - 6.90 %    Basophils 0.20 0.00 - 1.80 %    Immature Granulocytes 0.40 0.00 - 0.90 %    Nucleated RBC 0.00 /100 WBC    Neutrophils (Absolute) 6.21 2.00 - 7.15 K/uL    Lymphs (Absolute) 2.08 1.00 - 4.80 K/uL    Monos (Absolute) 0.73 0.00 - 0.85 K/uL    Eos (Absolute) 0.12 0.00 - 0.51 K/uL    Baso (Absolute) 0.02 0.00 - 0.12 K/uL    Immature Granulocytes (abs) 0.04 0.00 - 0.11 K/uL    NRBC (Absolute) 0.00 K/uL   Comp Metabolic Panel   Result Value Ref Range    Sodium 140 135 - 145 mmol/L    Potassium 4.0 3.6 - 5.5 mmol/L    Chloride 104 96 - 112 mmol/L    Co2 25 20 - 33 mmol/L    Anion Gap 11.0 7.0 - 16.0    Glucose 203 (H) 65 - 99 mg/dL    Bun 13 8 - 22 mg/dL    Creatinine 0.51 0.50 - 1.40 mg/dL    Calcium 9.6 8.5 - 10.5 mg/dL    AST(SGOT) 76 (H) 12 - 45 U/L    ALT(SGPT) 75 (H) 2 - 50  U/L    Alkaline Phosphatase 112 (H) 30 - 99 U/L    Total Bilirubin 0.2 0.1 - 1.5 mg/dL    Albumin 3.9 3.2 - 4.9 g/dL    Total Protein 7.7 6.0 - 8.2 g/dL    Globulin 3.8 (H) 1.9 - 3.5 g/dL    A-G Ratio 1.0 g/dL   LIPASE   Result Value Ref Range    Lipase 24 11 - 82 U/L   ESTIMATED GFR   Result Value Ref Range    GFR If African American >60 >60 mL/min/1.73 m 2    GFR If Non African American >60 >60 mL/min/1.73 m 2        RADIOLOGY  [id number] VIEW   Final Result         1.  Moderate stool in the colon suggests changes of constipation, otherwise nonspecific bowel gas pattern   2.  Hepatomegaly            COURSE & MEDICAL DECISION MAKING  I have reviewed any medical record information, laboratory studies and radiographic results as noted above.  Differential diagnoses includes: Constipation, obstruction, anemia, dehydration, electrolyte abnormalities, hepatitis, pancreatitis, biliary obstruction    Encounter Summary: This is a very pleasant 50 y.o. female who unfortunately required evaluation in the emergency department today with abdominal pain, located in the left upper quadrant, associated with the sensation of constipation and decreased bowel movements.  No peritonitis on exam, vital signs are normal.  X-ray is consistent with moderate stool burden within the colon, no evidence of obvious obstructive pathology on this image.  Blood work is unremarkable.  She will be treated with an enema ------ blood work is reassuring, minimal elevations in the LFTs but these are consistent with prior values, no other acute abnormalities.  Had a very small bowel movement with the enema but states really no significant relief.  At this point, I think she is stable and appropriate for discharge with no clear urgent or emergent etiologies being discovered although I do suspect constipation is the etiology.  She will go to the pharmacy and  a couple enemas on the way home, strict return instructions have been  discussed      DISPOSITION: Discharged home in stable condition      FINAL IMPRESSION  1. Left sided abdominal pain    2. Constipation, unspecified constipation type           This dictation was created using voice recognition software. The accuracy of the dictation is limited to the abilities of the software. I expect there may be some errors of grammar and possibly content. The nursing notes were reviewed and certain aspects of this information were incorporated into this note.    Electronically signed by: Alexis Perez M.D., 3/30/2021 11:13 PM

## 2021-04-02 ENCOUNTER — PHYSICAL THERAPY (OUTPATIENT)
Dept: PHYSICAL THERAPY | Facility: REHABILITATION | Age: 51
End: 2021-04-02
Attending: RADIOLOGY

## 2021-04-02 DIAGNOSIS — Z17.0 MALIGNANT NEOPLASM OF UPPER-OUTER QUADRANT OF RIGHT BREAST IN FEMALE, ESTROGEN RECEPTOR POSITIVE (HCC): ICD-10-CM

## 2021-04-02 DIAGNOSIS — C50.411 MALIGNANT NEOPLASM OF UPPER-OUTER QUADRANT OF RIGHT BREAST IN FEMALE, ESTROGEN RECEPTOR POSITIVE (HCC): ICD-10-CM

## 2021-04-02 PROCEDURE — 97110 THERAPEUTIC EXERCISES: CPT

## 2021-04-02 NOTE — OP THERAPY DAILY TREATMENT
"  Outpatient Physical Therapy  DAILY TREATMENT     [facility] Outpatient Physical Therapy [place]  [address] [place] [address], [address]  [facility] [address]  Phone:  [phone]  Fax:  [phone]    Date: 2021    Patient: Zahida Rodriguez  YOB: 1970  MRN: [id number]     Time Calculation    Start time: 0930  Stop time: 1000 Time Calculation (min): 30 minutes         Chief Complaint: Shoulder Problem    Visit #: 5    SUBJECTIVE:  : Alexandra [id number]  Feel 90% improved with PT, have no real pain with activities but still have difficulty with reaching up, forward, and down. Doing better opening jars, feel stronger.   Did have to go to ER yesterday due to constipation, but I am feeling better.     OBJECTIVE:  Current objective measures: R shoulder AROM: flex: 140 degrees. IR (HBB): T12, ER: 78 degrees. Abd: 130 degrees. L shoulder AROM: flex: 143 degrees. abd: 154 degrees, IR (HBB): T8, ER: 84 degrees.  B shoulder MMT: grossly 5/5   strength with dynamometer: R: 40#, 35#, 35# (av.67#). L: 40, 35, 30 (av#)  PT Functional Assessment Tool Used: Quick DASH  PT Functional Assessment Score: 54.55       Therapeutic Exercises (CPT 65982):     5. Wall crawl , R and L x10 ea     6. Crossbody stretch , 15\" x4 ea     7. IR stretch with towel , 15\" x4 R    8. Supine dowel AAROM, flexion x10     9. Supine serratus punch, x10 0#    16. UPOC 21      Therapeutic Exercise Summary: Stretches added to HEP, reviewed importance of daily stretching. Pt provided with a handout in Malagasy. Asked pt if she felt HEP had too many exercises- she said no, that it was manageable.     Therapeutic Treatments and Modalities:     1. Hot or Cold Pack Therapy (CPT 41256), CP to R shoulder x10 min , pt seated; no charge    Time-based treatments/modalities:    Physical Therapy Timed Treatment Charges  Therapeutic exercise minutes (CPT 28682): 25 minutes          ASSESSMENT:   Response to treatment: See Progress " Note.     PLAN/RECOMMENDATIONS:   Plan for treatment: therapy treatment to continue next visit.  Planned interventions for next visit: continue with current treatment.

## 2021-04-02 NOTE — OP THERAPY PROGRESS SUMMARY
Outpatient Physical Therapy  PROGRESS SUMMARY NOTE      [facility] [place]  [address], [address]  [place] [address]  Phone:  [phone]  Fax:  [phone]    Date of Visit: 2021    Patient: Zahida Rodriguez  YOB: 1970  MRN: [id number]     Referring Provider: Amy Kahn M.D.  [address]  [address]  [place] [address]   Referring Diagnosis Malignant neoplasm of upper-outer quadrant of right breast in female, estrogen receptor positive (HCC) [C50.411, Z17.0]     Visit Diagnoses     ICD-10-CM   1. Malignant neoplasm of upper-outer quadrant of right breast in female, estrogen receptor positive (HCC)  C50.411    Z17.0       Rehab Potential: good    Progress Report Period: 21- 21    Functional Assessment Used  PT Functional Assessment Tool Used: Quick DASH  PT Functional Assessment Score: 54.55       Objective Findings and Assessment:   Patient progression towards goals: Pt has been seen a total of 5 PT sessions with pt reported improvement of 90%. She does continue to report difficulty with reaching and this is also evident with continued decrease in measured R shoulder AROM. She does demo improvement overall in AROM; however, and she has demo'd significant improvement in strength to allow her to more easily complete ADL's. STG's have been met and LTG's partially met. She would continue to benefit from skilled PT to increase functional R shoulder AROM to assist with I/ADL's as well as improve QuickDASH for objective functional improvement.     Objective findings and assessment details: R shoulder AROM: flex: 140 degrees. IR (HBB): T12, ER: 78 degrees. Abd: 130 degrees. L shoulder AROM: flex: 143 degrees. abd: 154 degrees, IR (HBB): T8, ER: 84 degrees.  B shoulder MMT: grossly 5/5   strength with dynamometer: R: 40#, 35#, 35# (av.67#). L: 40, 35, 30 (av#)    Goals:   Short Term Goals:   1. HEP will be established (met)  2. Pt will be  compliant and independent with HEP (met)  Short term goal time span:  1-2 weeks      Long Term Goals:    1. Improve R shoulder flex AROM to 160 degrees to allow for improved tolerance to OH reaching (not met)  2. Improve B shoulder abd AROM by 10 degrees to allow for improved OH reaching (met)  3. Increase R shoulder strength by 1/2 MMT grade for improved functional use of R shoulder (met)  4. R  strength equal to L for increased functional use of R UE (met)  5. Pt will tolerate don/doffing shoes and socks without increased R shoulder PQ (met)  6. Improve QuickDASH by 16 points to demo improvement in overall function (not met, continue)  Long term goal time span:  4-6 weeks    Plan:   Planned therapy interventions:  Neuromuscular Re-education (CPT 97631), Therapeutic Exercise (CPT 64594) and E Stim Unattended (CPT 85709)  Frequency: 1-2x/week.  Duration in weeks:  4  Duration in visits:  8  Plan details:  Anticipate DC by end of month, but will continue to monitor pt's progress re: ROM and function. Thank you.       Referring provider co-signature:  I have reviewed this plan of care and my co-signature certifies the need for services.     Certification Period: 04/02/2021 to 04/30/21    Physician Signature: ________________________________ Date: ______________

## 2021-04-03 ENCOUNTER — IMMUNIZATION (OUTPATIENT)
Dept: FAMILY PLANNING/WOMEN'S HEALTH CLINIC | Facility: IMMUNIZATION CENTER | Age: 51
End: 2021-04-03
Payer: OTHER GOVERNMENT

## 2021-04-03 DIAGNOSIS — Z23 ENCOUNTER FOR VACCINATION: Primary | ICD-10-CM

## 2021-04-03 PROCEDURE — 0001A PFIZER SARS-COV-2 VACCINE: CPT | Performed by: INTERNAL MEDICINE

## 2021-04-03 PROCEDURE — 91300 PFIZER SARS-COV-2 VACCINE: CPT | Performed by: INTERNAL MEDICINE

## 2021-04-24 ENCOUNTER — IMMUNIZATION (OUTPATIENT)
Dept: FAMILY PLANNING/WOMEN'S HEALTH CLINIC | Facility: IMMUNIZATION CENTER | Age: 51
End: 2021-04-24
Attending: INTERNAL MEDICINE
Payer: OTHER GOVERNMENT

## 2021-04-24 DIAGNOSIS — Z23 ENCOUNTER FOR VACCINATION: Primary | ICD-10-CM

## 2021-04-24 PROCEDURE — 91300 PFIZER SARS-COV-2 VACCINE: CPT

## 2021-04-24 PROCEDURE — 0002A PFIZER SARS-COV-2 VACCINE: CPT

## 2021-04-30 ENCOUNTER — TELEPHONE (OUTPATIENT)
Dept: PHYSICAL THERAPY | Facility: REHABILITATION | Age: 51
End: 2021-04-30

## 2021-04-30 NOTE — OP THERAPY DISCHARGE SUMMARY
Outpatient Physical Therapy  DISCHARGE SUMMARY NOTE      [facility] [place]  [address]  [place] [address]  Phone:  [phone]  Fax:  [phone]    Date of Visit: 04/30/2021    Patient: Zahida Rodriguez  YOB: 1970  MRN: [id number]     Referring Provider: Amy Kahn M.D.  [address]  [place] [address]   Referring Diagnosis Malignant neoplasm of upper-outer quadrant of right breast in female, estrogen receptor positive (HCC) [C50.411, Z17.0]         Functional Assessment Used     See last progress note 4/2/21    Your patient is being discharged from Physical Therapy with the following comments:   · Patient has failed to schedule or reschedule follow-up visits    Comments:  Pt was last seen for PT session 4/2/21 when progress note was completed. She has since not returned to PT.      Limitations Remaining:  Unknown beyond last progress note.    Recommendations:  Pt will be DC'd at this time. Thank you.     Vania Griffin, PT, DPT    Date: 4/30/2021

## 2021-11-18 ENCOUNTER — HOSPITAL ENCOUNTER (OUTPATIENT)
Dept: LAB | Facility: MEDICAL CENTER | Age: 51
End: 2021-11-18
Attending: INTERNAL MEDICINE
Payer: COMMERCIAL

## 2021-11-18 LAB
ALBUMIN SERPL BCP-MCNC: 4.4 G/DL (ref 3.2–4.9)
ALBUMIN/GLOB SERPL: 1.1 G/DL
ALP SERPL-CCNC: 145 U/L (ref 30–99)
ALT SERPL-CCNC: 47 U/L (ref 2–50)
ANION GAP SERPL CALC-SCNC: 14 MMOL/L (ref 7–16)
AST SERPL-CCNC: 39 U/L (ref 12–45)
BASOPHILS # BLD AUTO: 0.3 % (ref 0–1.8)
BASOPHILS # BLD: 0.03 K/UL (ref 0–0.12)
BILIRUB SERPL-MCNC: 0.4 MG/DL (ref 0.1–1.5)
BUN SERPL-MCNC: 13 MG/DL (ref 8–22)
CALCIUM SERPL-MCNC: 10.6 MG/DL (ref 8.5–10.5)
CHLORIDE SERPL-SCNC: 101 MMOL/L (ref 96–112)
CO2 SERPL-SCNC: 27 MMOL/L (ref 20–33)
CREAT SERPL-MCNC: 0.48 MG/DL (ref 0.5–1.4)
EOSINOPHIL # BLD AUTO: 0.1 K/UL (ref 0–0.51)
EOSINOPHIL NFR BLD: 1.1 % (ref 0–6.9)
ERYTHROCYTE [DISTWIDTH] IN BLOOD BY AUTOMATED COUNT: 45.9 FL (ref 35.9–50)
GLOBULIN SER CALC-MCNC: 3.9 G/DL (ref 1.9–3.5)
GLUCOSE SERPL-MCNC: 109 MG/DL (ref 65–99)
HCT VFR BLD AUTO: 45.5 % (ref 37–47)
HGB BLD-MCNC: 14.8 G/DL (ref 12–16)
IMM GRANULOCYTES # BLD AUTO: 0.05 K/UL (ref 0–0.11)
IMM GRANULOCYTES NFR BLD AUTO: 0.5 % (ref 0–0.9)
LYMPHOCYTES # BLD AUTO: 2.88 K/UL (ref 1–4.8)
LYMPHOCYTES NFR BLD: 30.7 % (ref 22–41)
MCH RBC QN AUTO: 28.7 PG (ref 27–33)
MCHC RBC AUTO-ENTMCNC: 32.5 G/DL (ref 33.6–35)
MCV RBC AUTO: 88.3 FL (ref 81.4–97.8)
MONOCYTES # BLD AUTO: 0.52 K/UL (ref 0–0.85)
MONOCYTES NFR BLD AUTO: 5.5 % (ref 0–13.4)
NEUTROPHILS # BLD AUTO: 5.81 K/UL (ref 2–7.15)
NEUTROPHILS NFR BLD: 61.9 % (ref 44–72)
NRBC # BLD AUTO: 0 K/UL
NRBC BLD-RTO: 0 /100 WBC
PLATELET # BLD AUTO: 409 K/UL (ref 164–446)
PMV BLD AUTO: 10 FL (ref 9–12.9)
POTASSIUM SERPL-SCNC: 4 MMOL/L (ref 3.6–5.5)
PROT SERPL-MCNC: 8.3 G/DL (ref 6–8.2)
RBC # BLD AUTO: 5.15 M/UL (ref 4.2–5.4)
SODIUM SERPL-SCNC: 142 MMOL/L (ref 135–145)
WBC # BLD AUTO: 9.4 K/UL (ref 4.8–10.8)

## 2021-11-18 PROCEDURE — 36415 COLL VENOUS BLD VENIPUNCTURE: CPT

## 2021-11-18 PROCEDURE — 80053 COMPREHEN METABOLIC PANEL: CPT

## 2021-11-18 PROCEDURE — 85025 COMPLETE CBC W/AUTO DIFF WBC: CPT

## 2022-02-09 ENCOUNTER — HOSPITAL ENCOUNTER (OUTPATIENT)
Dept: LAB | Facility: MEDICAL CENTER | Age: 52
End: 2022-02-09
Attending: INTERNAL MEDICINE
Payer: COMMERCIAL

## 2022-02-09 LAB
ALBUMIN SERPL BCP-MCNC: 4.6 G/DL (ref 3.2–4.9)
ALBUMIN/GLOB SERPL: 1.2 G/DL
ALP SERPL-CCNC: 142 U/L (ref 30–99)
ALT SERPL-CCNC: 31 U/L (ref 2–50)
ANION GAP SERPL CALC-SCNC: 14 MMOL/L (ref 7–16)
AST SERPL-CCNC: 29 U/L (ref 12–45)
BASOPHILS # BLD AUTO: 0.3 % (ref 0–1.8)
BASOPHILS # BLD: 0.03 K/UL (ref 0–0.12)
BILIRUB SERPL-MCNC: 0.3 MG/DL (ref 0.1–1.5)
BUN SERPL-MCNC: 11 MG/DL (ref 8–22)
CALCIUM SERPL-MCNC: 10.3 MG/DL (ref 8.5–10.5)
CHLORIDE SERPL-SCNC: 100 MMOL/L (ref 96–112)
CO2 SERPL-SCNC: 23 MMOL/L (ref 20–33)
CREAT SERPL-MCNC: 0.4 MG/DL (ref 0.5–1.4)
EOSINOPHIL # BLD AUTO: 0.06 K/UL (ref 0–0.51)
EOSINOPHIL NFR BLD: 0.6 % (ref 0–6.9)
ERYTHROCYTE [DISTWIDTH] IN BLOOD BY AUTOMATED COUNT: 43.2 FL (ref 35.9–50)
GLOBULIN SER CALC-MCNC: 3.7 G/DL (ref 1.9–3.5)
GLUCOSE SERPL-MCNC: 150 MG/DL (ref 65–99)
HCT VFR BLD AUTO: 43.8 % (ref 37–47)
HGB BLD-MCNC: 14.7 G/DL (ref 12–16)
IMM GRANULOCYTES # BLD AUTO: 0.06 K/UL (ref 0–0.11)
IMM GRANULOCYTES NFR BLD AUTO: 0.6 % (ref 0–0.9)
LYMPHOCYTES # BLD AUTO: 2.69 K/UL (ref 1–4.8)
LYMPHOCYTES NFR BLD: 26.7 % (ref 22–41)
MCH RBC QN AUTO: 28.6 PG (ref 27–33)
MCHC RBC AUTO-ENTMCNC: 33.6 G/DL (ref 33.6–35)
MCV RBC AUTO: 85.2 FL (ref 81.4–97.8)
MONOCYTES # BLD AUTO: 0.53 K/UL (ref 0–0.85)
MONOCYTES NFR BLD AUTO: 5.3 % (ref 0–13.4)
NEUTROPHILS # BLD AUTO: 6.7 K/UL (ref 2–7.15)
NEUTROPHILS NFR BLD: 66.5 % (ref 44–72)
NRBC # BLD AUTO: 0 K/UL
NRBC BLD-RTO: 0 /100 WBC
PLATELET # BLD AUTO: 388 K/UL (ref 164–446)
PMV BLD AUTO: 10.4 FL (ref 9–12.9)
POTASSIUM SERPL-SCNC: 3.8 MMOL/L (ref 3.6–5.5)
PROT SERPL-MCNC: 8.3 G/DL (ref 6–8.2)
RBC # BLD AUTO: 5.14 M/UL (ref 4.2–5.4)
SODIUM SERPL-SCNC: 137 MMOL/L (ref 135–145)
WBC # BLD AUTO: 10.1 K/UL (ref 4.8–10.8)

## 2022-02-09 PROCEDURE — 36415 COLL VENOUS BLD VENIPUNCTURE: CPT

## 2022-02-09 PROCEDURE — 80053 COMPREHEN METABOLIC PANEL: CPT

## 2022-02-09 PROCEDURE — 85025 COMPLETE CBC W/AUTO DIFF WBC: CPT

## 2022-06-06 ENCOUNTER — HOSPITAL ENCOUNTER (OUTPATIENT)
Dept: LAB | Facility: MEDICAL CENTER | Age: 52
End: 2022-06-06
Attending: INTERNAL MEDICINE
Payer: COMMERCIAL

## 2022-06-06 LAB
ALBUMIN SERPL BCP-MCNC: 4.5 G/DL (ref 3.2–4.9)
ALBUMIN/GLOB SERPL: 1.4 G/DL
ALP SERPL-CCNC: 133 U/L (ref 30–99)
ALT SERPL-CCNC: 44 U/L (ref 2–50)
ANION GAP SERPL CALC-SCNC: 12 MMOL/L (ref 7–16)
AST SERPL-CCNC: 34 U/L (ref 12–45)
BASOPHILS # BLD AUTO: 0.5 % (ref 0–1.8)
BASOPHILS # BLD: 0.04 K/UL (ref 0–0.12)
BILIRUB SERPL-MCNC: 0.4 MG/DL (ref 0.1–1.5)
BUN SERPL-MCNC: 14 MG/DL (ref 8–22)
CALCIUM SERPL-MCNC: 9.9 MG/DL (ref 8.5–10.5)
CHLORIDE SERPL-SCNC: 104 MMOL/L (ref 96–112)
CO2 SERPL-SCNC: 24 MMOL/L (ref 20–33)
CREAT SERPL-MCNC: 0.45 MG/DL (ref 0.5–1.4)
EOSINOPHIL # BLD AUTO: 0.09 K/UL (ref 0–0.51)
EOSINOPHIL NFR BLD: 1.2 % (ref 0–6.9)
ERYTHROCYTE [DISTWIDTH] IN BLOOD BY AUTOMATED COUNT: 47.7 FL (ref 35.9–50)
GFR SERPLBLD CREATININE-BSD FMLA CKD-EPI: 116 ML/MIN/1.73 M 2
GLOBULIN SER CALC-MCNC: 3.3 G/DL (ref 1.9–3.5)
GLUCOSE SERPL-MCNC: 123 MG/DL (ref 65–99)
HCT VFR BLD AUTO: 45 % (ref 37–47)
HGB BLD-MCNC: 14.8 G/DL (ref 12–16)
IMM GRANULOCYTES # BLD AUTO: 0.02 K/UL (ref 0–0.11)
IMM GRANULOCYTES NFR BLD AUTO: 0.3 % (ref 0–0.9)
LYMPHOCYTES # BLD AUTO: 2.85 K/UL (ref 1–4.8)
LYMPHOCYTES NFR BLD: 38.9 % (ref 22–41)
MCH RBC QN AUTO: 28.8 PG (ref 27–33)
MCHC RBC AUTO-ENTMCNC: 32.9 G/DL (ref 33.6–35)
MCV RBC AUTO: 87.5 FL (ref 81.4–97.8)
MONOCYTES # BLD AUTO: 0.51 K/UL (ref 0–0.85)
MONOCYTES NFR BLD AUTO: 7 % (ref 0–13.4)
NEUTROPHILS # BLD AUTO: 3.81 K/UL (ref 2–7.15)
NEUTROPHILS NFR BLD: 52.1 % (ref 44–72)
NRBC # BLD AUTO: 0 K/UL
NRBC BLD-RTO: 0 /100 WBC
PLATELET # BLD AUTO: 377 K/UL (ref 164–446)
PMV BLD AUTO: 10.1 FL (ref 9–12.9)
POTASSIUM SERPL-SCNC: 3.8 MMOL/L (ref 3.6–5.5)
PROT SERPL-MCNC: 7.8 G/DL (ref 6–8.2)
RBC # BLD AUTO: 5.14 M/UL (ref 4.2–5.4)
SODIUM SERPL-SCNC: 140 MMOL/L (ref 135–145)
WBC # BLD AUTO: 7.3 K/UL (ref 4.8–10.8)

## 2022-06-06 PROCEDURE — 80053 COMPREHEN METABOLIC PANEL: CPT

## 2022-06-06 PROCEDURE — 85025 COMPLETE CBC W/AUTO DIFF WBC: CPT

## 2022-06-06 PROCEDURE — 36415 COLL VENOUS BLD VENIPUNCTURE: CPT

## 2022-08-24 ENCOUNTER — HOSPITAL ENCOUNTER (OUTPATIENT)
Dept: LAB | Facility: MEDICAL CENTER | Age: 52
End: 2022-08-24
Attending: INTERNAL MEDICINE
Payer: COMMERCIAL

## 2022-08-24 LAB
ALBUMIN SERPL BCP-MCNC: 4.4 G/DL (ref 3.2–4.9)
ALBUMIN/GLOB SERPL: 1.3 G/DL
ALP SERPL-CCNC: 137 U/L (ref 30–99)
ALT SERPL-CCNC: 27 U/L (ref 2–50)
ANION GAP SERPL CALC-SCNC: 11 MMOL/L (ref 7–16)
AST SERPL-CCNC: 23 U/L (ref 12–45)
BASOPHILS # BLD AUTO: 0.5 % (ref 0–1.8)
BASOPHILS # BLD: 0.04 K/UL (ref 0–0.12)
BILIRUB SERPL-MCNC: 0.4 MG/DL (ref 0.1–1.5)
BUN SERPL-MCNC: 12 MG/DL (ref 8–22)
CALCIUM SERPL-MCNC: 9.6 MG/DL (ref 8.5–10.5)
CHLORIDE SERPL-SCNC: 103 MMOL/L (ref 96–112)
CO2 SERPL-SCNC: 24 MMOL/L (ref 20–33)
CREAT SERPL-MCNC: 0.42 MG/DL (ref 0.5–1.4)
EOSINOPHIL # BLD AUTO: 0.08 K/UL (ref 0–0.51)
EOSINOPHIL NFR BLD: 0.9 % (ref 0–6.9)
ERYTHROCYTE [DISTWIDTH] IN BLOOD BY AUTOMATED COUNT: 45.1 FL (ref 35.9–50)
GFR SERPLBLD CREATININE-BSD FMLA CKD-EPI: 118 ML/MIN/1.73 M 2
GLOBULIN SER CALC-MCNC: 3.3 G/DL (ref 1.9–3.5)
GLUCOSE SERPL-MCNC: 133 MG/DL (ref 65–99)
HCT VFR BLD AUTO: 41.4 % (ref 37–47)
HGB BLD-MCNC: 13.6 G/DL (ref 12–16)
IMM GRANULOCYTES # BLD AUTO: 0.02 K/UL (ref 0–0.11)
IMM GRANULOCYTES NFR BLD AUTO: 0.2 % (ref 0–0.9)
LYMPHOCYTES # BLD AUTO: 3.23 K/UL (ref 1–4.8)
LYMPHOCYTES NFR BLD: 38 % (ref 22–41)
MCH RBC QN AUTO: 28.8 PG (ref 27–33)
MCHC RBC AUTO-ENTMCNC: 32.9 G/DL (ref 33.6–35)
MCV RBC AUTO: 87.7 FL (ref 81.4–97.8)
MONOCYTES # BLD AUTO: 0.55 K/UL (ref 0–0.85)
MONOCYTES NFR BLD AUTO: 6.5 % (ref 0–13.4)
NEUTROPHILS # BLD AUTO: 4.57 K/UL (ref 2–7.15)
NEUTROPHILS NFR BLD: 53.9 % (ref 44–72)
NRBC # BLD AUTO: 0 K/UL
NRBC BLD-RTO: 0 /100 WBC
PLATELET # BLD AUTO: 387 K/UL (ref 164–446)
PMV BLD AUTO: 10 FL (ref 9–12.9)
POTASSIUM SERPL-SCNC: 3.8 MMOL/L (ref 3.6–5.5)
PROT SERPL-MCNC: 7.7 G/DL (ref 6–8.2)
RBC # BLD AUTO: 4.72 M/UL (ref 4.2–5.4)
SODIUM SERPL-SCNC: 138 MMOL/L (ref 135–145)
WBC # BLD AUTO: 8.5 K/UL (ref 4.8–10.8)

## 2022-08-24 PROCEDURE — 80053 COMPREHEN METABOLIC PANEL: CPT

## 2022-08-24 PROCEDURE — 36415 COLL VENOUS BLD VENIPUNCTURE: CPT

## 2022-08-24 PROCEDURE — 85025 COMPLETE CBC W/AUTO DIFF WBC: CPT

## 2023-01-24 ENCOUNTER — HOSPITAL ENCOUNTER (OUTPATIENT)
Dept: RADIOLOGY | Facility: MEDICAL CENTER | Age: 53
End: 2023-01-24
Attending: NURSE PRACTITIONER
Payer: COMMERCIAL

## 2023-01-24 DIAGNOSIS — Z79.899 NEED FOR PROPHYLACTIC CHEMOTHERAPY: ICD-10-CM

## 2023-01-24 PROCEDURE — 77080 DXA BONE DENSITY AXIAL: CPT

## 2023-03-13 ENCOUNTER — HOSPITAL ENCOUNTER (OUTPATIENT)
Dept: LAB | Facility: MEDICAL CENTER | Age: 53
End: 2023-03-13
Attending: INTERNAL MEDICINE
Payer: COMMERCIAL

## 2023-03-13 LAB
BASOPHILS # BLD AUTO: 0.5 % (ref 0–1.8)
BASOPHILS # BLD: 0.04 K/UL (ref 0–0.12)
EOSINOPHIL # BLD AUTO: 0.11 K/UL (ref 0–0.51)
EOSINOPHIL NFR BLD: 1.4 % (ref 0–6.9)
ERYTHROCYTE [DISTWIDTH] IN BLOOD BY AUTOMATED COUNT: 45.3 FL (ref 35.9–50)
HCT VFR BLD AUTO: 43.7 % (ref 37–47)
HGB BLD-MCNC: 14.5 G/DL (ref 12–16)
IMM GRANULOCYTES # BLD AUTO: 0.03 K/UL (ref 0–0.11)
IMM GRANULOCYTES NFR BLD AUTO: 0.4 % (ref 0–0.9)
LYMPHOCYTES # BLD AUTO: 2.88 K/UL (ref 1–4.8)
LYMPHOCYTES NFR BLD: 37.7 % (ref 22–41)
MCH RBC QN AUTO: 29.7 PG (ref 27–33)
MCHC RBC AUTO-ENTMCNC: 33.2 G/DL (ref 33.6–35)
MCV RBC AUTO: 89.4 FL (ref 81.4–97.8)
MONOCYTES # BLD AUTO: 0.53 K/UL (ref 0–0.85)
MONOCYTES NFR BLD AUTO: 6.9 % (ref 0–13.4)
NEUTROPHILS # BLD AUTO: 4.04 K/UL (ref 2–7.15)
NEUTROPHILS NFR BLD: 53.1 % (ref 44–72)
NRBC # BLD AUTO: 0 K/UL
NRBC BLD-RTO: 0 /100 WBC
PLATELET # BLD AUTO: 327 K/UL (ref 164–446)
PMV BLD AUTO: 10 FL (ref 9–12.9)
RBC # BLD AUTO: 4.89 M/UL (ref 4.2–5.4)
WBC # BLD AUTO: 7.6 K/UL (ref 4.8–10.8)

## 2023-03-13 PROCEDURE — 85025 COMPLETE CBC W/AUTO DIFF WBC: CPT

## 2023-03-13 PROCEDURE — 80053 COMPREHEN METABOLIC PANEL: CPT

## 2023-03-13 PROCEDURE — 36415 COLL VENOUS BLD VENIPUNCTURE: CPT

## 2023-03-14 LAB
ALBUMIN SERPL BCP-MCNC: 4.6 G/DL (ref 3.2–4.9)
ALBUMIN/GLOB SERPL: 1.4 G/DL
ALP SERPL-CCNC: 109 U/L (ref 30–99)
ALT SERPL-CCNC: 23 U/L (ref 2–50)
ANION GAP SERPL CALC-SCNC: 12 MMOL/L (ref 7–16)
AST SERPL-CCNC: 19 U/L (ref 12–45)
BILIRUB SERPL-MCNC: 0.4 MG/DL (ref 0.1–1.5)
BUN SERPL-MCNC: 14 MG/DL (ref 8–22)
CALCIUM ALBUM COR SERPL-MCNC: 9 MG/DL (ref 8.5–10.5)
CALCIUM SERPL-MCNC: 9.5 MG/DL (ref 8.5–10.5)
CHLORIDE SERPL-SCNC: 104 MMOL/L (ref 96–112)
CO2 SERPL-SCNC: 24 MMOL/L (ref 20–33)
CREAT SERPL-MCNC: 0.43 MG/DL (ref 0.5–1.4)
GFR SERPLBLD CREATININE-BSD FMLA CKD-EPI: 117 ML/MIN/1.73 M 2
GLOBULIN SER CALC-MCNC: 3.2 G/DL (ref 1.9–3.5)
GLUCOSE SERPL-MCNC: 94 MG/DL (ref 65–99)
POTASSIUM SERPL-SCNC: 4.2 MMOL/L (ref 3.6–5.5)
PROT SERPL-MCNC: 7.8 G/DL (ref 6–8.2)
SODIUM SERPL-SCNC: 140 MMOL/L (ref 135–145)

## 2023-04-19 NOTE — ED NOTES
Pt and family updated on POC   Abbe Flap (Lower To Upper Lip) Text: The defect of the upper lip was assessed and measured.  Given the location and size of the defect, an Abbe flap was deemed most appropriate.  Using a sterile surgical marker, an appropriate Abbe flap was measured and drawn on the lower lip. Local anesthesia was then infiltrated. A scalpel was then used to incise the upper lip through and through the skin, vermilion, muscle and mucosa, leaving the flap pedicled on the opposite side.  The flap was then rotated and transferred to the lower lip defect.  The flap was then sutured into place with a three layer technique, closing the orbicularis oris muscle layer with subcutaneous buried sutures, followed by a mucosal layer and an epidermal layer.

## 2023-09-19 ENCOUNTER — HOSPITAL ENCOUNTER (OUTPATIENT)
Dept: LAB | Facility: MEDICAL CENTER | Age: 53
End: 2023-09-19
Attending: INTERNAL MEDICINE
Payer: COMMERCIAL

## 2023-09-19 LAB
ALBUMIN SERPL BCP-MCNC: 4.4 G/DL (ref 3.2–4.9)
ALBUMIN/GLOB SERPL: 1.5 G/DL
ALP SERPL-CCNC: 82 U/L (ref 30–99)
ALT SERPL-CCNC: 27 U/L (ref 2–50)
ANION GAP SERPL CALC-SCNC: 10 MMOL/L (ref 7–16)
AST SERPL-CCNC: 19 U/L (ref 12–45)
BASOPHILS # BLD AUTO: 0.4 % (ref 0–1.8)
BASOPHILS # BLD: 0.04 K/UL (ref 0–0.12)
BILIRUB SERPL-MCNC: 0.3 MG/DL (ref 0.1–1.5)
BUN SERPL-MCNC: 19 MG/DL (ref 8–22)
CALCIUM ALBUM COR SERPL-MCNC: 9.6 MG/DL (ref 8.5–10.5)
CALCIUM SERPL-MCNC: 9.9 MG/DL (ref 8.5–10.5)
CHLORIDE SERPL-SCNC: 103 MMOL/L (ref 96–112)
CO2 SERPL-SCNC: 28 MMOL/L (ref 20–33)
CREAT SERPL-MCNC: 0.51 MG/DL (ref 0.5–1.4)
EOSINOPHIL # BLD AUTO: 0.04 K/UL (ref 0–0.51)
EOSINOPHIL NFR BLD: 0.4 % (ref 0–6.9)
ERYTHROCYTE [DISTWIDTH] IN BLOOD BY AUTOMATED COUNT: 46.8 FL (ref 35.9–50)
GFR SERPLBLD CREATININE-BSD FMLA CKD-EPI: 112 ML/MIN/1.73 M 2
GLOBULIN SER CALC-MCNC: 3 G/DL (ref 1.9–3.5)
GLUCOSE SERPL-MCNC: 99 MG/DL (ref 65–99)
HCT VFR BLD AUTO: 45.3 % (ref 37–47)
HGB BLD-MCNC: 14.5 G/DL (ref 12–16)
IMM GRANULOCYTES # BLD AUTO: 0.03 K/UL (ref 0–0.11)
IMM GRANULOCYTES NFR BLD AUTO: 0.3 % (ref 0–0.9)
LYMPHOCYTES # BLD AUTO: 2.41 K/UL (ref 1–4.8)
LYMPHOCYTES NFR BLD: 24.4 % (ref 22–41)
MCH RBC QN AUTO: 29.4 PG (ref 27–33)
MCHC RBC AUTO-ENTMCNC: 32 G/DL (ref 32.2–35.5)
MCV RBC AUTO: 91.9 FL (ref 81.4–97.8)
MONOCYTES # BLD AUTO: 0.59 K/UL (ref 0–0.85)
MONOCYTES NFR BLD AUTO: 6 % (ref 0–13.4)
NEUTROPHILS # BLD AUTO: 6.75 K/UL (ref 1.82–7.42)
NEUTROPHILS NFR BLD: 68.5 % (ref 44–72)
NRBC # BLD AUTO: 0 K/UL
NRBC BLD-RTO: 0 /100 WBC (ref 0–0.2)
PLATELET # BLD AUTO: 351 K/UL (ref 164–446)
PMV BLD AUTO: 9.7 FL (ref 9–12.9)
POTASSIUM SERPL-SCNC: 4.2 MMOL/L (ref 3.6–5.5)
PROT SERPL-MCNC: 7.4 G/DL (ref 6–8.2)
RBC # BLD AUTO: 4.93 M/UL (ref 4.2–5.4)
SODIUM SERPL-SCNC: 141 MMOL/L (ref 135–145)
WBC # BLD AUTO: 9.9 K/UL (ref 4.8–10.8)

## 2023-09-19 PROCEDURE — 85025 COMPLETE CBC W/AUTO DIFF WBC: CPT

## 2023-09-19 PROCEDURE — 36415 COLL VENOUS BLD VENIPUNCTURE: CPT

## 2023-09-19 PROCEDURE — 80053 COMPREHEN METABOLIC PANEL: CPT

## 2023-11-26 ENCOUNTER — APPOINTMENT (OUTPATIENT)
Dept: RADIOLOGY | Facility: MEDICAL CENTER | Age: 53
End: 2023-11-26
Attending: EMERGENCY MEDICINE
Payer: COMMERCIAL

## 2023-11-26 ENCOUNTER — HOSPITAL ENCOUNTER (EMERGENCY)
Facility: MEDICAL CENTER | Age: 53
End: 2023-11-26
Attending: EMERGENCY MEDICINE
Payer: COMMERCIAL

## 2023-11-26 VITALS
WEIGHT: 240 LBS | HEART RATE: 76 BPM | TEMPERATURE: 98 F | DIASTOLIC BLOOD PRESSURE: 69 MMHG | OXYGEN SATURATION: 95 % | SYSTOLIC BLOOD PRESSURE: 145 MMHG | RESPIRATION RATE: 18 BRPM | BODY MASS INDEX: 46.87 KG/M2

## 2023-11-26 DIAGNOSIS — K59.00 CONSTIPATION, UNSPECIFIED CONSTIPATION TYPE: ICD-10-CM

## 2023-11-26 DIAGNOSIS — R10.11 RUQ ABDOMINAL PAIN: ICD-10-CM

## 2023-11-26 LAB
ALBUMIN SERPL BCP-MCNC: 4.4 G/DL (ref 3.2–4.9)
ALBUMIN/GLOB SERPL: 1.5 G/DL
ALP SERPL-CCNC: 84 U/L (ref 30–99)
ALT SERPL-CCNC: 29 U/L (ref 2–50)
ANION GAP SERPL CALC-SCNC: 11 MMOL/L (ref 7–16)
APPEARANCE UR: CLEAR
AST SERPL-CCNC: 20 U/L (ref 12–45)
BASOPHILS # BLD AUTO: 0.5 % (ref 0–1.8)
BASOPHILS # BLD: 0.04 K/UL (ref 0–0.12)
BILIRUB SERPL-MCNC: 0.3 MG/DL (ref 0.1–1.5)
BILIRUB UR QL STRIP.AUTO: NEGATIVE
BUN SERPL-MCNC: 15 MG/DL (ref 8–22)
CALCIUM ALBUM COR SERPL-MCNC: 9 MG/DL (ref 8.5–10.5)
CALCIUM SERPL-MCNC: 9.3 MG/DL (ref 8.5–10.5)
CHLORIDE SERPL-SCNC: 102 MMOL/L (ref 96–112)
CO2 SERPL-SCNC: 27 MMOL/L (ref 20–33)
COLOR UR: YELLOW
CREAT SERPL-MCNC: 0.4 MG/DL (ref 0.5–1.4)
EOSINOPHIL # BLD AUTO: 0.1 K/UL (ref 0–0.51)
EOSINOPHIL NFR BLD: 1.4 % (ref 0–6.9)
ERYTHROCYTE [DISTWIDTH] IN BLOOD BY AUTOMATED COUNT: 44.2 FL (ref 35.9–50)
GFR SERPLBLD CREATININE-BSD FMLA CKD-EPI: 118 ML/MIN/1.73 M 2
GLOBULIN SER CALC-MCNC: 2.9 G/DL (ref 1.9–3.5)
GLUCOSE SERPL-MCNC: 139 MG/DL (ref 65–99)
GLUCOSE UR STRIP.AUTO-MCNC: NEGATIVE MG/DL
HCT VFR BLD AUTO: 43 % (ref 37–47)
HGB BLD-MCNC: 14.4 G/DL (ref 12–16)
IMM GRANULOCYTES # BLD AUTO: 0.03 K/UL (ref 0–0.11)
IMM GRANULOCYTES NFR BLD AUTO: 0.4 % (ref 0–0.9)
KETONES UR STRIP.AUTO-MCNC: NEGATIVE MG/DL
LEUKOCYTE ESTERASE UR QL STRIP.AUTO: NEGATIVE
LIPASE SERPL-CCNC: 22 U/L (ref 11–82)
LYMPHOCYTES # BLD AUTO: 2.77 K/UL (ref 1–4.8)
LYMPHOCYTES NFR BLD: 37.6 % (ref 22–41)
MCH RBC QN AUTO: 29.8 PG (ref 27–33)
MCHC RBC AUTO-ENTMCNC: 33.5 G/DL (ref 32.2–35.5)
MCV RBC AUTO: 89 FL (ref 81.4–97.8)
MICRO URNS: NORMAL
MONOCYTES # BLD AUTO: 0.48 K/UL (ref 0–0.85)
MONOCYTES NFR BLD AUTO: 6.5 % (ref 0–13.4)
NEUTROPHILS # BLD AUTO: 3.94 K/UL (ref 1.82–7.42)
NEUTROPHILS NFR BLD: 53.6 % (ref 44–72)
NITRITE UR QL STRIP.AUTO: NEGATIVE
NRBC # BLD AUTO: 0 K/UL
NRBC BLD-RTO: 0 /100 WBC (ref 0–0.2)
PH UR STRIP.AUTO: 8 [PH] (ref 5–8)
PLATELET # BLD AUTO: 325 K/UL (ref 164–446)
PMV BLD AUTO: 9.1 FL (ref 9–12.9)
POTASSIUM SERPL-SCNC: 4.3 MMOL/L (ref 3.6–5.5)
PROT SERPL-MCNC: 7.3 G/DL (ref 6–8.2)
PROT UR QL STRIP: NEGATIVE MG/DL
RBC # BLD AUTO: 4.83 M/UL (ref 4.2–5.4)
RBC UR QL AUTO: NEGATIVE
SODIUM SERPL-SCNC: 140 MMOL/L (ref 135–145)
SP GR UR STRIP.AUTO: 1.01
UROBILINOGEN UR STRIP.AUTO-MCNC: 0.2 MG/DL
WBC # BLD AUTO: 7.4 K/UL (ref 4.8–10.8)

## 2023-11-26 PROCEDURE — 36415 COLL VENOUS BLD VENIPUNCTURE: CPT

## 2023-11-26 PROCEDURE — 99285 EMERGENCY DEPT VISIT HI MDM: CPT

## 2023-11-26 PROCEDURE — 71045 X-RAY EXAM CHEST 1 VIEW: CPT

## 2023-11-26 PROCEDURE — 83690 ASSAY OF LIPASE: CPT

## 2023-11-26 PROCEDURE — 85025 COMPLETE CBC W/AUTO DIFF WBC: CPT

## 2023-11-26 PROCEDURE — 700102 HCHG RX REV CODE 250 W/ 637 OVERRIDE(OP): Performed by: EMERGENCY MEDICINE

## 2023-11-26 PROCEDURE — 80053 COMPREHEN METABOLIC PANEL: CPT

## 2023-11-26 PROCEDURE — 81003 URINALYSIS AUTO W/O SCOPE: CPT

## 2023-11-26 PROCEDURE — A9270 NON-COVERED ITEM OR SERVICE: HCPCS | Performed by: EMERGENCY MEDICINE

## 2023-11-26 PROCEDURE — 76705 ECHO EXAM OF ABDOMEN: CPT

## 2023-11-26 RX ORDER — SIMETHICONE 125 MG
125 TABLET,CHEWABLE ORAL EVERY 6 HOURS PRN
Qty: 120 TABLET | Refills: 0 | Status: SHIPPED | OUTPATIENT
Start: 2023-11-26

## 2023-11-26 RX ORDER — OXYCODONE HYDROCHLORIDE AND ACETAMINOPHEN 5; 325 MG/1; MG/1
1 TABLET ORAL ONCE
Status: COMPLETED | OUTPATIENT
Start: 2023-11-26 | End: 2023-11-26

## 2023-11-26 RX ORDER — AMOXICILLIN 250 MG
1 CAPSULE ORAL DAILY
Qty: 30 TABLET | Refills: 0 | Status: SHIPPED | OUTPATIENT
Start: 2023-11-26

## 2023-11-26 RX ORDER — DICYCLOMINE HCL 20 MG
20 TABLET ORAL EVERY 6 HOURS
Qty: 28 TABLET | Refills: 0 | Status: SHIPPED | OUTPATIENT
Start: 2023-11-26 | End: 2023-12-03

## 2023-11-26 RX ADMIN — OXYCODONE AND ACETAMINOPHEN 1 TABLET: 5; 325 TABLET ORAL at 10:47

## 2023-11-26 RX ADMIN — LIDOCAINE HYDROCHLORIDE 30 ML: 20 SOLUTION OROPHARYNGEAL at 11:00

## 2023-11-26 ASSESSMENT — LIFESTYLE VARIABLES: DO YOU DRINK ALCOHOL: NO

## 2023-11-26 ASSESSMENT — FIBROSIS 4 INDEX: FIB4 SCORE: 0.55

## 2023-11-26 NOTE — ED PROVIDER NOTES
ED Provider Note    CHIEF COMPLAINT  Chief Complaint   Patient presents with    RUQ Pain     Pt c/o RUQ pain increasing since yesterday. Denies N/V            HPI/ROS  LIMITATION TO HISTORY   Select: Language Kazakh,  Used       Zahida Rodriguez is a 53 y.o. female who presents with chief complaint of right upper quadrant pain.  Patient reports right upper quadrant pain began yesterday, it has been coming and going since that time.  Patient denies any association of pain with eating.  Pain is random in its onset.  She denies any associated chest pain.  Pain is worsened with deep breaths.  Patient reports she is also significantly constipated.  She denies any associated nausea or vomiting.  Patient denies any fevers.  Patient denies any associated neck or back pain.  She denies any flank pain.  She denies any dysuria urgency or frequency.  She denies any history of kidney stones.  Patient denies any history of abdominal surgeries  Denies any hemoptysis  denies surgery, fracture or casted extremity within the last mo  denies use of estrogen containing OCP  denies hx of VTE. denies active or recently active ca      PAST MEDICAL HISTORY   has a past medical history of Cancer (HCC) (2013), COVID-19 virus detected (07/15/2020), Diabetes (HCC), High cholesterol, Malignant neoplasm of upper-outer quadrant of right female breast (HCC), Pain, Psychiatric problem, and Snoring.    SURGICAL HISTORY   has a past surgical history that includes other (2013); other orthopedic surgery; gyn surgery (1989,1991); mastectomy, simple, complete (Left, 4/10/2020); mastectomy, modified radical (Right, 4/10/2020); axillary node dissection (Right, 4/10/2020); cath placement (Left, 4/10/2020); and orif, ankle (Left, 8/27/2020).    FAMILY HISTORY  Family History   Problem Relation Age of Onset    Cancer Father         prostate    Cancer Other        SOCIAL HISTORY  Social History     Tobacco Use    Smoking status: Never     Smokeless tobacco: Never   Vaping Use    Vaping Use: Never used   Substance and Sexual Activity    Alcohol use: Yes     Comment: 1 per week     Drug use: Never    Sexual activity: Not on file       CURRENT MEDICATIONS  Home Medications       Reviewed by Kristin Gonzalez R.N. (Registered Nurse) on 11/26/23 at 0912  Med List Status: Partial     Medication Last Dose Status   acetaminophen (TYLENOL) 325 MG Tab  Active   anastrozole (ARIMIDEX) 1 MG Tab  Active   ascorbic acid (ASCORBIC ACID) 500 MG Tab  Active   aspirin EC (ECOTRIN) 325 MG Tablet Delayed Response  Active   atorvastatin (LIPITOR) 10 MG Tab  Active   Cholecalciferol (VITAMIN D3) 125 MCG (5000 UT) Cap  Active   lidocaine-prilocaine (EMLA) 2.5-2.5 % Cream  Active   metFORMIN (GLUCOPHAGE) 500 MG Tab  Active   Multiple Vitamins-Minerals (WOMENS MULTIVITAMIN PO)  Active   oxyCODONE immediate-release (ROXICODONE) 5 MG Tab  Active   silver sulfADIAZINE (SILVADENE) 1 % Cream  Active   tamoxifen (NOLVADEX) 10 MG Tab  Active                    ALLERGIES  No Known Allergies    PHYSICAL EXAM  VITAL SIGNS: BP (!) 163/100   Pulse 76   Temp 35.9 °C (96.7 °F) (Temporal)   Resp (!) 22   Wt 109 kg (240 lb)   SpO2 100%   BMI 46.87 kg/m²    Physical Exam  Constitutional:       Appearance: She is well-developed.   HENT:      Head: Normocephalic and atraumatic.   Eyes:      Pupils: Pupils are equal, round, and reactive to light.   Cardiovascular:      Rate and Rhythm: Normal rate and regular rhythm.   Pulmonary:      Effort: Pulmonary effort is normal. No accessory muscle usage or respiratory distress.      Breath sounds: Normal breath sounds.   Abdominal:      General: Bowel sounds are normal.      Palpations: Abdomen is soft. There is no mass.      Tenderness: There is abdominal tenderness.      Comments: Right upper quadrant tenderness to palpation without rebound or guarding, negative Ernandez's   Musculoskeletal:         General: Normal range of motion.   Skin:      General: Skin is warm.      Capillary Refill: Capillary refill takes less than 2 seconds.   Neurological:      General: No focal deficit present.      Mental Status: She is alert.   Psychiatric:         Mood and Affect: Mood normal. Mood is not anxious.           DIAGNOSTIC STUDIES / PROCEDURES      LABS  Results for orders placed or performed during the hospital encounter of 11/26/23   CBC WITH DIFFERENTIAL   Result Value Ref Range    WBC 7.4 4.8 - 10.8 K/uL    RBC 4.83 4.20 - 5.40 M/uL    Hemoglobin 14.4 12.0 - 16.0 g/dL    Hematocrit 43.0 37.0 - 47.0 %    MCV 89.0 81.4 - 97.8 fL    MCH 29.8 27.0 - 33.0 pg    MCHC 33.5 32.2 - 35.5 g/dL    RDW 44.2 35.9 - 50.0 fL    Platelet Count 325 164 - 446 K/uL    MPV 9.1 9.0 - 12.9 fL    Neutrophils-Polys 53.60 44.00 - 72.00 %    Lymphocytes 37.60 22.00 - 41.00 %    Monocytes 6.50 0.00 - 13.40 %    Eosinophils 1.40 0.00 - 6.90 %    Basophils 0.50 0.00 - 1.80 %    Immature Granulocytes 0.40 0.00 - 0.90 %    Nucleated RBC 0.00 0.00 - 0.20 /100 WBC    Neutrophils (Absolute) 3.94 1.82 - 7.42 K/uL    Lymphs (Absolute) 2.77 1.00 - 4.80 K/uL    Monos (Absolute) 0.48 0.00 - 0.85 K/uL    Eos (Absolute) 0.10 0.00 - 0.51 K/uL    Baso (Absolute) 0.04 0.00 - 0.12 K/uL    Immature Granulocytes (abs) 0.03 0.00 - 0.11 K/uL    NRBC (Absolute) 0.00 K/uL   COMP METABOLIC PANEL   Result Value Ref Range    Sodium 140 135 - 145 mmol/L    Potassium 4.3 3.6 - 5.5 mmol/L    Chloride 102 96 - 112 mmol/L    Co2 27 20 - 33 mmol/L    Anion Gap 11.0 7.0 - 16.0    Glucose 139 (H) 65 - 99 mg/dL    Bun 15 8 - 22 mg/dL    Creatinine 0.40 (L) 0.50 - 1.40 mg/dL    Calcium 9.3 8.5 - 10.5 mg/dL    Correct Calcium 9.0 8.5 - 10.5 mg/dL    AST(SGOT) 20 12 - 45 U/L    ALT(SGPT) 29 2 - 50 U/L    Alkaline Phosphatase 84 30 - 99 U/L    Total Bilirubin 0.3 0.1 - 1.5 mg/dL    Albumin 4.4 3.2 - 4.9 g/dL    Total Protein 7.3 6.0 - 8.2 g/dL    Globulin 2.9 1.9 - 3.5 g/dL    A-G Ratio 1.5 g/dL   LIPASE   Result Value Ref  Range    Lipase 22 11 - 82 U/L   URINALYSIS,CULTURE IF INDICATED    Specimen: Urine   Result Value Ref Range    Color Yellow     Character Clear     Specific Gravity 1.013 <1.035    Ph 8.0 5.0 - 8.0    Glucose Negative Negative mg/dL    Ketones Negative Negative mg/dL    Protein Negative Negative mg/dL    Bilirubin Negative Negative    Urobilinogen, Urine 0.2 Negative    Nitrite Negative Negative    Leukocyte Esterase Negative Negative    Occult Blood Negative Negative    Micro Urine Req see below    ESTIMATED GFR   Result Value Ref Range    GFR (CKD-EPI) 118 >60 mL/min/1.73 m 2         RADIOLOGY  I have independently interpreted the diagnostic imaging associated with this visit and am waiting the final reading from the radiologist.   My preliminary interpretation is as follows: Unremarkable x-ray, unremarkable right upper quadrant ultrasound  Radiologist interpretation:   DX-CHEST-PORTABLE (1 VIEW)   Final Result      No acute cardiac or pulmonary abnormalities are identified.      US-RUQ   Final Result      1.  Unremarkable right upper quadrant ultrasound.            COURSE & MEDICAL DECISION MAKING        INITIAL ASSESSMENT, COURSE AND PLAN  Care Narrative: Patient here with presentation of colicky right upper quadrant abdominal pain.  Certainly gallstone is possible and therefore will check ultrasound.  Will also check basic labs and CMP to ensure there is no associated cholestasis.  Patient without any associated right lower quadrant pain, she does not have any significant anorexia or vomiting, she has not had any fever.  I have a lower suspicion of appendicitis.  Patient without any associated chest pain or shortness of breath, she has easily reproducible pain on palpation of the right upper quadrant, likely cardiopulmonary etiology is unlikely but will check chest x-ray for possible pleural effusion.  Patient is not currently treated for any cancer and has been cancer free for years,, and has no major risk  factors for pulmonary embolus outside of her age, symptoms be highly atypical of PE.  Patient ultrasound is unremarkable.  Basic labs are all unremarkable.  Patient symptoms improved here after GI cocktail and some Percocet.  Patient be discharged home with Bentyl, senna docusate, instructions to take MiraLAX and some simethicone.  Patient understands to return in 2 days if symptoms persist, I discussed return precautions otherwise she understands if her symptoms worsen significantly to return immediately.      DISPOSITION AND DISCUSSIONS      Escalation of care considered, and ultimately not performed: On a very low clinical suspicion of retroverted appendicitis or surgical pathology otherwise in this patient without any associated rebound or guarding, normal white count, and no associated right lower quadrant abdominal pain, therefore CT abdomen pelvis deferred        FINAL DIAGNOSIS  1. RUQ abdominal pain    2. Constipation, unspecified constipation type

## 2023-11-26 NOTE — ED TRIAGE NOTES
Pt to triage .  Chief Complaint   Patient presents with    RUQ Pain     Pt c/o RUQ pain increasing since yesterday. Denies N/V

## 2023-11-26 NOTE — DISCHARGE INSTRUCTIONS
I would like you to take MiraLAX, 1 cap in the morning and 1 In the afternoon for the next 7 days.  Please also take the other medicines I have sent you home with for the next week.

## 2024-03-19 ENCOUNTER — HOSPITAL ENCOUNTER (OUTPATIENT)
Dept: LAB | Facility: MEDICAL CENTER | Age: 54
End: 2024-03-19
Attending: INTERNAL MEDICINE
Payer: COMMERCIAL

## 2024-03-19 LAB
ALBUMIN SERPL BCP-MCNC: 4.7 G/DL (ref 3.2–4.9)
ALBUMIN/GLOB SERPL: 1.5 G/DL
ALP SERPL-CCNC: 77 U/L (ref 30–99)
ALT SERPL-CCNC: 19 U/L (ref 2–50)
ANION GAP SERPL CALC-SCNC: 14 MMOL/L (ref 7–16)
AST SERPL-CCNC: 21 U/L (ref 12–45)
BASOPHILS # BLD AUTO: 0.5 % (ref 0–1.8)
BASOPHILS # BLD: 0.05 K/UL (ref 0–0.12)
BILIRUB SERPL-MCNC: 0.5 MG/DL (ref 0.1–1.5)
BUN SERPL-MCNC: 13 MG/DL (ref 8–22)
CALCIUM ALBUM COR SERPL-MCNC: 9.5 MG/DL (ref 8.5–10.5)
CALCIUM SERPL-MCNC: 10.1 MG/DL (ref 8.5–10.5)
CHLORIDE SERPL-SCNC: 99 MMOL/L (ref 96–112)
CO2 SERPL-SCNC: 25 MMOL/L (ref 20–33)
CREAT SERPL-MCNC: 0.37 MG/DL (ref 0.5–1.4)
EOSINOPHIL # BLD AUTO: 0.08 K/UL (ref 0–0.51)
EOSINOPHIL NFR BLD: 0.8 % (ref 0–6.9)
ERYTHROCYTE [DISTWIDTH] IN BLOOD BY AUTOMATED COUNT: 44 FL (ref 35.9–50)
GFR SERPLBLD CREATININE-BSD FMLA CKD-EPI: 120 ML/MIN/1.73 M 2
GLOBULIN SER CALC-MCNC: 3.2 G/DL (ref 1.9–3.5)
GLUCOSE SERPL-MCNC: 84 MG/DL (ref 65–99)
HCT VFR BLD AUTO: 43.2 % (ref 37–47)
HGB BLD-MCNC: 14.4 G/DL (ref 12–16)
IMM GRANULOCYTES # BLD AUTO: 0.03 K/UL (ref 0–0.11)
IMM GRANULOCYTES NFR BLD AUTO: 0.3 % (ref 0–0.9)
LYMPHOCYTES # BLD AUTO: 3.07 K/UL (ref 1–4.8)
LYMPHOCYTES NFR BLD: 31.6 % (ref 22–41)
MCH RBC QN AUTO: 29.7 PG (ref 27–33)
MCHC RBC AUTO-ENTMCNC: 33.3 G/DL (ref 32.2–35.5)
MCV RBC AUTO: 89.1 FL (ref 81.4–97.8)
MONOCYTES # BLD AUTO: 0.75 K/UL (ref 0–0.85)
MONOCYTES NFR BLD AUTO: 7.7 % (ref 0–13.4)
NEUTROPHILS # BLD AUTO: 5.74 K/UL (ref 1.82–7.42)
NEUTROPHILS NFR BLD: 59.1 % (ref 44–72)
NRBC # BLD AUTO: 0 K/UL
NRBC BLD-RTO: 0 /100 WBC (ref 0–0.2)
PLATELET # BLD AUTO: 356 K/UL (ref 164–446)
PMV BLD AUTO: 9.5 FL (ref 9–12.9)
POTASSIUM SERPL-SCNC: 4.3 MMOL/L (ref 3.6–5.5)
PROT SERPL-MCNC: 7.9 G/DL (ref 6–8.2)
RBC # BLD AUTO: 4.85 M/UL (ref 4.2–5.4)
SODIUM SERPL-SCNC: 138 MMOL/L (ref 135–145)
WBC # BLD AUTO: 9.7 K/UL (ref 4.8–10.8)

## 2024-03-19 PROCEDURE — 36415 COLL VENOUS BLD VENIPUNCTURE: CPT

## 2024-03-19 PROCEDURE — 85025 COMPLETE CBC W/AUTO DIFF WBC: CPT

## 2024-03-19 PROCEDURE — 80053 COMPREHEN METABOLIC PANEL: CPT

## 2024-09-17 ENCOUNTER — HOSPITAL ENCOUNTER (OUTPATIENT)
Dept: LAB | Facility: MEDICAL CENTER | Age: 54
End: 2024-09-17
Payer: COMMERCIAL

## 2024-09-17 LAB — UREA BREATH TEST QL: POSITIVE

## 2024-09-17 PROCEDURE — 83013 H PYLORI (C-13) BREATH: CPT

## 2024-10-14 ENCOUNTER — HOSPITAL ENCOUNTER (OUTPATIENT)
Dept: LAB | Facility: MEDICAL CENTER | Age: 54
End: 2024-10-14
Attending: INTERNAL MEDICINE
Payer: COMMERCIAL

## 2024-10-14 LAB
ALBUMIN SERPL BCP-MCNC: 4.3 G/DL (ref 3.2–4.9)
ALBUMIN/GLOB SERPL: 1.2 G/DL
ALP SERPL-CCNC: 64 U/L (ref 30–99)
ALT SERPL-CCNC: 32 U/L (ref 2–50)
ANION GAP SERPL CALC-SCNC: 9 MMOL/L (ref 7–16)
AST SERPL-CCNC: 26 U/L (ref 12–45)
BASOPHILS # BLD AUTO: 0.6 % (ref 0–1.8)
BASOPHILS # BLD: 0.04 K/UL (ref 0–0.12)
BILIRUB SERPL-MCNC: 0.4 MG/DL (ref 0.1–1.5)
BUN SERPL-MCNC: 13 MG/DL (ref 8–22)
CALCIUM ALBUM COR SERPL-MCNC: 9.5 MG/DL (ref 8.5–10.5)
CALCIUM SERPL-MCNC: 9.7 MG/DL (ref 8.5–10.5)
CHLORIDE SERPL-SCNC: 104 MMOL/L (ref 96–112)
CO2 SERPL-SCNC: 27 MMOL/L (ref 20–33)
CREAT SERPL-MCNC: 0.47 MG/DL (ref 0.5–1.4)
EOSINOPHIL # BLD AUTO: 0.09 K/UL (ref 0–0.51)
EOSINOPHIL NFR BLD: 1.4 % (ref 0–6.9)
ERYTHROCYTE [DISTWIDTH] IN BLOOD BY AUTOMATED COUNT: 44.7 FL (ref 35.9–50)
GFR SERPLBLD CREATININE-BSD FMLA CKD-EPI: 113 ML/MIN/1.73 M 2
GLOBULIN SER CALC-MCNC: 3.5 G/DL (ref 1.9–3.5)
GLUCOSE SERPL-MCNC: 127 MG/DL (ref 65–99)
HCT VFR BLD AUTO: 44.5 % (ref 37–47)
HGB BLD-MCNC: 15 G/DL (ref 12–16)
IMM GRANULOCYTES # BLD AUTO: 0.01 K/UL (ref 0–0.11)
IMM GRANULOCYTES NFR BLD AUTO: 0.2 % (ref 0–0.9)
LYMPHOCYTES # BLD AUTO: 2.72 K/UL (ref 1–4.8)
LYMPHOCYTES NFR BLD: 42.7 % (ref 22–41)
MCH RBC QN AUTO: 30.1 PG (ref 27–33)
MCHC RBC AUTO-ENTMCNC: 33.7 G/DL (ref 32.2–35.5)
MCV RBC AUTO: 89.2 FL (ref 81.4–97.8)
MONOCYTES # BLD AUTO: 0.5 K/UL (ref 0–0.85)
MONOCYTES NFR BLD AUTO: 7.8 % (ref 0–13.4)
NEUTROPHILS # BLD AUTO: 3.01 K/UL (ref 1.82–7.42)
NEUTROPHILS NFR BLD: 47.3 % (ref 44–72)
NRBC # BLD AUTO: 0 K/UL
NRBC BLD-RTO: 0 /100 WBC (ref 0–0.2)
PLATELET # BLD AUTO: 372 K/UL (ref 164–446)
PMV BLD AUTO: 10.1 FL (ref 9–12.9)
POTASSIUM SERPL-SCNC: 4.3 MMOL/L (ref 3.6–5.5)
PROT SERPL-MCNC: 7.8 G/DL (ref 6–8.2)
RBC # BLD AUTO: 4.99 M/UL (ref 4.2–5.4)
SODIUM SERPL-SCNC: 140 MMOL/L (ref 135–145)
WBC # BLD AUTO: 6.4 K/UL (ref 4.8–10.8)

## 2024-10-14 PROCEDURE — 80053 COMPREHEN METABOLIC PANEL: CPT

## 2024-10-14 PROCEDURE — 36415 COLL VENOUS BLD VENIPUNCTURE: CPT

## 2024-10-14 PROCEDURE — 85025 COMPLETE CBC W/AUTO DIFF WBC: CPT

## 2025-04-16 ENCOUNTER — HOSPITAL ENCOUNTER (OUTPATIENT)
Dept: LAB | Facility: MEDICAL CENTER | Age: 55
End: 2025-04-16
Attending: INTERNAL MEDICINE
Payer: COMMERCIAL

## 2025-04-16 LAB
ALBUMIN SERPL BCP-MCNC: 4.6 G/DL (ref 3.2–4.9)
ALBUMIN/GLOB SERPL: 1.4 G/DL
ALP SERPL-CCNC: 73 U/L (ref 30–99)
ALT SERPL-CCNC: 34 U/L (ref 2–50)
ANION GAP SERPL CALC-SCNC: 11 MMOL/L (ref 7–16)
AST SERPL-CCNC: 24 U/L (ref 12–45)
BASOPHILS # BLD AUTO: 0.6 % (ref 0–1.8)
BASOPHILS # BLD: 0.04 K/UL (ref 0–0.12)
BILIRUB SERPL-MCNC: 0.4 MG/DL (ref 0.1–1.5)
BUN SERPL-MCNC: 13 MG/DL (ref 8–22)
CALCIUM ALBUM COR SERPL-MCNC: 9.4 MG/DL (ref 8.5–10.5)
CALCIUM SERPL-MCNC: 9.9 MG/DL (ref 8.5–10.5)
CHLORIDE SERPL-SCNC: 104 MMOL/L (ref 96–112)
CO2 SERPL-SCNC: 25 MMOL/L (ref 20–33)
CREAT SERPL-MCNC: 0.56 MG/DL (ref 0.5–1.4)
EOSINOPHIL # BLD AUTO: 0.12 K/UL (ref 0–0.51)
EOSINOPHIL NFR BLD: 1.9 % (ref 0–6.9)
ERYTHROCYTE [DISTWIDTH] IN BLOOD BY AUTOMATED COUNT: 44.6 FL (ref 35.9–50)
GFR SERPLBLD CREATININE-BSD FMLA CKD-EPI: 108 ML/MIN/1.73 M 2
GLOBULIN SER CALC-MCNC: 3.2 G/DL (ref 1.9–3.5)
GLUCOSE SERPL-MCNC: 120 MG/DL (ref 65–99)
HCT VFR BLD AUTO: 44.3 % (ref 37–47)
HGB BLD-MCNC: 14.8 G/DL (ref 12–16)
IMM GRANULOCYTES # BLD AUTO: 0.01 K/UL (ref 0–0.11)
IMM GRANULOCYTES NFR BLD AUTO: 0.2 % (ref 0–0.9)
LYMPHOCYTES # BLD AUTO: 2.34 K/UL (ref 1–4.8)
LYMPHOCYTES NFR BLD: 37 % (ref 22–41)
MCH RBC QN AUTO: 30 PG (ref 27–33)
MCHC RBC AUTO-ENTMCNC: 33.4 G/DL (ref 32.2–35.5)
MCV RBC AUTO: 89.9 FL (ref 81.4–97.8)
MONOCYTES # BLD AUTO: 0.44 K/UL (ref 0–0.85)
MONOCYTES NFR BLD AUTO: 7 % (ref 0–13.4)
NEUTROPHILS # BLD AUTO: 3.38 K/UL (ref 1.82–7.42)
NEUTROPHILS NFR BLD: 53.3 % (ref 44–72)
NRBC # BLD AUTO: 0 K/UL
NRBC BLD-RTO: 0 /100 WBC (ref 0–0.2)
PLATELET # BLD AUTO: 368 K/UL (ref 164–446)
PMV BLD AUTO: 10.1 FL (ref 9–12.9)
POTASSIUM SERPL-SCNC: 4.2 MMOL/L (ref 3.6–5.5)
PROT SERPL-MCNC: 7.8 G/DL (ref 6–8.2)
RBC # BLD AUTO: 4.93 M/UL (ref 4.2–5.4)
SODIUM SERPL-SCNC: 140 MMOL/L (ref 135–145)
WBC # BLD AUTO: 6.3 K/UL (ref 4.8–10.8)

## 2025-04-16 PROCEDURE — 80053 COMPREHEN METABOLIC PANEL: CPT

## 2025-04-16 PROCEDURE — 85025 COMPLETE CBC W/AUTO DIFF WBC: CPT

## 2025-04-16 PROCEDURE — 36415 COLL VENOUS BLD VENIPUNCTURE: CPT

## (undated) DEVICE — GLOVE BIOGEL SZ 7.5 SURGICAL PF LTX - (50PR/BX 4BX/CA)

## (undated) DEVICE — DRAPE C-ARM LARGE 41IN X 74 IN - (10/BX 2BX/CA)

## (undated) DEVICE — DETERGENT RENUZYME PLUS 10 OZ PACKET (50/BX)

## (undated) DEVICE — CHLORAPREP 26 ML APPLICATOR - ORANGE TINT(25/CA)

## (undated) DEVICE — BLADE SURGICAL CLIPPER - (50EA/CA)

## (undated) DEVICE — SHEET TRANSVERSE LAP - (12EA/CA)

## (undated) DEVICE — DRESSING TRANSPARENT FILM TEGADERM 4 X 4.75" (50EA/BX)"

## (undated) DEVICE — CATHETER IV 20 GA X 1-1/4 ---SURG.& SDS ONLY--- (50EA/BX)

## (undated) DEVICE — SET LEADWIRE 5 LEAD BEDSIDE DISPOSABLE ECG (1SET OF 5/EA)

## (undated) DEVICE — TUBE CONNECT SUCTION CLEAR 120 X 1/4" (50EA/CA)"

## (undated) DEVICE — WATER IRRIGATION STERILE 1000ML (12EA/CA)

## (undated) DEVICE — SUTURE 3-0 VICRYL PLUS SH - 8X 18 INCH (12/BX)

## (undated) DEVICE — DRAPE LARGE 3 QUARTER - (20/CA)

## (undated) DEVICE — GLOVE BIOGEL INDICATOR SZ 7.5 SURGICAL PF LTX - (50PR/BX 4BX/CA)

## (undated) DEVICE — PAD LAP STERILE 18 X 18 - (5/PK 40PK/CA)

## (undated) DEVICE — PROTECTOR ULNA NERVE - (36PR/CA)

## (undated) DEVICE — BLADE SURGICAL #11 - (50/BX)

## (undated) DEVICE — DRILL BIT 1.8MMX80MM CALIBRATED (4TX2=8)

## (undated) DEVICE — KIT  I.V. START (100EA/CA)

## (undated) DEVICE — SUTURE 2-0 SILK FS (12EA/BX)

## (undated) DEVICE — GOWN WARMING STANDARD FLEX - (30/CA)

## (undated) DEVICE — GLOVE BIOGEL SZ 6.5 SURGICAL PF LTX (50PR/BX 4BX/CA)

## (undated) DEVICE — ELECTRODE DUAL RETURN W/ CORD - (50/PK)

## (undated) DEVICE — NEPTUNE 4 PORT MANIFOLD - (20/PK)

## (undated) DEVICE — SENSOR SPO2 NEO LNCS ADHESIVE (20/BX) SEE USER NOTES

## (undated) DEVICE — SPONGE GAUZESTER 4 X 4 4PLY - (128PK/CA)

## (undated) DEVICE — TUBING CLEARLINK DUO-VENT - C-FLO (48EA/CA)

## (undated) DEVICE — DRAPE LAPAROTOMY T SHEET - (12EA/CA)

## (undated) DEVICE — BOVIE  BLADE 6 EXTENDED - (50/PK)

## (undated) DEVICE — SLEEVE, VASO, THIGH, MED

## (undated) DEVICE — GLOVE BIOGEL PI INDICATOR SZ 8.0 SURGICAL PF LF -(50/BX 4BX/CA)

## (undated) DEVICE — WRAP COBAN SELF-ADHERENT 6 IN X  5YDS STERILE TAN (12/CA)

## (undated) DEVICE — PACK MAJOR BASIN - (2EA/CA)

## (undated) DEVICE — SUCTION INSTRUMENT YANKAUER BULBOUS TIP W/O VENT (50EA/CA)

## (undated) DEVICE — KIT ROOM DECONTAMINATION

## (undated) DEVICE — GOWN SURGEONS X-LARGE - DISP. (30/CA)

## (undated) DEVICE — PACK MINOR BASIN - (2EA/CA)

## (undated) DEVICE — TUBE CONNECTING SUCTION - CLEAR PLASTIC STERILE 72 IN (50EA/CA)

## (undated) DEVICE — LACTATED RINGERS INJ 1000 ML - (14EA/CA 60CA/PF)

## (undated) DEVICE — SUTURE 2-0 PROLENE SH (36PK/BX)

## (undated) DEVICE — GLOVE BIOGEL SZ 8 SURGICAL PF LTX - (50PR/BX 4BX/CA)

## (undated) DEVICE — SURGICLIP, M-11.5

## (undated) DEVICE — CANISTER SUCTION 3000ML MECHANICAL FILTER AUTO SHUTOFF MEDI-VAC NONSTERILE LF DISP  (40EA/CA)

## (undated) DEVICE — PADDING CAST 6 IN STERILE - 6 X 4 YDS (24/CA)

## (undated) DEVICE — BANDAGE ELASTIC 6 HONEYCOMB - 6X5YD LF (20/CA)"

## (undated) DEVICE — SOD. CHL. INJ. 0.9% 250 ML - (36/CA 50CA/PF)

## (undated) DEVICE — CLIP LG INTNL HRZN TI ESCP LGT - (20/BX)

## (undated) DEVICE — SUTURE 4-0 MONOCRYL PLUS PS-2 - 27 INCH (36/BX)

## (undated) DEVICE — GLOVE BIOGEL INDICATOR SZ 8 SURGICAL PF LTX - (50/BX 4BX/CA)

## (undated) DEVICE — ELECTRODE 850 FOAM ADHESIVE - HYDROGEL RADIOTRNSPRNT (50/PK)

## (undated) DEVICE — CANISTER SUCTION RIGID RED 1500CC (40EA/CA)

## (undated) DEVICE — GELAQUASONIC 100 ULTRASOUND - 48/BX 20GM STERILE FOIL POUCH

## (undated) DEVICE — STAPLER SKIN DISP - (6/BX 10BX/CA) VISISTAT

## (undated) DEVICE — SYRINGE 10 ML CONTROL LL (25EA/BX 4BX/CA)

## (undated) DEVICE — NEEDLE NON SAFETY HYPO 22 GA X 1 1/2 IN (100/BX)

## (undated) DEVICE — HEAD HOLDER JUNIOR/ADULT

## (undated) DEVICE — RETRACTOR LIGHTED RADIALUX

## (undated) DEVICE — RESERVOIR SUCTION 100 CC - SILICONE (20EA/CA)

## (undated) DEVICE — NEEDLE NON SAFETY 25 GA X 1 1/2 IN HYPO (100EA/BX)

## (undated) DEVICE — CLIP MED LG INTNL HRZN TI ESCP - (20/BX)

## (undated) DEVICE — DRAPE LOWER EXTREMETY - (6/CA)

## (undated) DEVICE — SYRINGE 30 ML LL (56/BX)

## (undated) DEVICE — SODIUM CHL IRRIGATION 0.9% 1000ML (12EA/CA)

## (undated) DEVICE — BANDAGE ELASTIC STERILE MATRIX 6 X 10 (20EA/CA)

## (undated) DEVICE — GLOVE BIOGEL INDICATOR SZ 6.5 SURGICAL PF LTX - (50PR/BX 4BX/CA)

## (undated) DEVICE — STERI STRIP COMPOUND BENZOIN - TINCTURE 0.6ML WITH APPLICATOR (40EA/BX)

## (undated) DEVICE — CLIP MED INTNL HRZN TI ESCP - (25/BX)

## (undated) DEVICE — DRAIN J-VAC 10MM FLAT - (10/CA)

## (undated) DEVICE — BLADE SURGICAL #15 - (50/BX 3BX/CA)

## (undated) DEVICE — GLOVE SZ 8 BIOGEL PI MICRO - PF LF (50PR/BX)

## (undated) DEVICE — DRAPE C ARMOR (12EA/CA)

## (undated) DEVICE — CLOSURE SKIN STRIP 1/2 X 4 IN - (STERI STRIP) (50/BX 4BX/CA)

## (undated) DEVICE — COVER CIV-FLEX TRANSDUCER - (24/BX)

## (undated) DEVICE — SUTURE 4-0 MONOCRYL PLUS PS-1 - 27 INCH (36/BX)

## (undated) DEVICE — MASK ANESTHESIA ADULT  - (100/CA)

## (undated) DEVICE — SET EXTENSION WITH 2 PORTS (48EA/CA) ***PART #2C8610 IS A SUBSTITUTE*****

## (undated) DEVICE — BOVIE BLADE COATED &INSULATED - 25/PK

## (undated) DEVICE — SUTURE GENERAL

## (undated) DEVICE — DRAPE CHEST/BREAST - (12EA/CA)

## (undated) DEVICE — GLOVE BIOGEL SZ 8.5 SURGICAL PF LTX - (50PR/BX 4BX/CA)

## (undated) DEVICE — DRAPE 36X28IN RAD CARM BND BG - (25/CA) O

## (undated) DEVICE — CUFF TOURNIQUET 44 X 4 ONE PORT DISP - STERILE (10/CA)

## (undated) DEVICE — TOWELS CLOTH SURGICAL - (4/PK 20PK/CA)

## (undated) DEVICE — APPLIER OPEN MEDIUM CLIP (6EA/BX)

## (undated) DEVICE — KIT ANESTHESIA W/CIRCUIT & 3/LT BAG W/FILTER (20EA/CA)

## (undated) DEVICE — SUTURE 2-0 VICRYL PLUS CT-1 - 8 X 18 INCH(12/BX)

## (undated) DEVICE — TUBE E-T HI-LO CUFF 7.0MM (10EA/PK)

## (undated) DEVICE — DECANTER FLD BLS - (50/CA)

## (undated) DEVICE — GLOVE BIOGEL PI INDICATOR SZ 7.0 SURGICAL PF LF - (50/BX 4BX/CA)

## (undated) DEVICE — TRAY SKIN SCRUB PVP WET (20EA/CA) PART #DYND70356 DISCONTINUED

## (undated) DEVICE — GOWN SURGEONS LARGE - (32/CA)

## (undated) DEVICE — SPONGE GAUZESTER. 2X2 4-PL - (2/PK 50PK/BX 30BX/CS)

## (undated) DEVICE — SUTURE 3-0 ETHILON FSLX 30 (36PK/BX)"